# Patient Record
Sex: MALE | Race: WHITE | NOT HISPANIC OR LATINO | Employment: FULL TIME | URBAN - NONMETROPOLITAN AREA
[De-identification: names, ages, dates, MRNs, and addresses within clinical notes are randomized per-mention and may not be internally consistent; named-entity substitution may affect disease eponyms.]

---

## 2017-01-08 ENCOUNTER — APPOINTMENT (OUTPATIENT)
Dept: GENERAL RADIOLOGY | Facility: HOSPITAL | Age: 52
End: 2017-01-08

## 2017-01-08 ENCOUNTER — APPOINTMENT (OUTPATIENT)
Dept: CT IMAGING | Facility: HOSPITAL | Age: 52
End: 2017-01-08

## 2017-01-08 ENCOUNTER — HOSPITAL ENCOUNTER (EMERGENCY)
Facility: HOSPITAL | Age: 52
Discharge: HOME | End: 2017-01-08
Attending: EMERGENCY MEDICINE | Admitting: EMERGENCY MEDICINE

## 2017-01-08 VITALS
RESPIRATION RATE: 18 BRPM | BODY MASS INDEX: 36.43 KG/M2 | TEMPERATURE: 97.9 F | HEART RATE: 93 BPM | OXYGEN SATURATION: 98 % | DIASTOLIC BLOOD PRESSURE: 79 MMHG | HEIGHT: 69 IN | SYSTOLIC BLOOD PRESSURE: 119 MMHG | WEIGHT: 246 LBS

## 2017-01-08 DIAGNOSIS — R06.09 DYSPNEA ON EXERTION: Primary | ICD-10-CM

## 2017-01-08 LAB
A-A DO2: 33.6 MMHG (ref 0–300)
ALBUMIN SERPL-MCNC: 4 G/DL (ref 3.5–5)
ALBUMIN/GLOB SERPL: 1.3 G/DL (ref 1.5–2.5)
ALP SERPL-CCNC: 58 U/L (ref 46–116)
ALT SERPL W P-5'-P-CCNC: 19 U/L (ref 10–44)
ANION GAP SERPL CALCULATED.3IONS-SCNC: 5.1 MMOL/L (ref 3.6–11.2)
ARTERIAL PATENCY WRIST A: ABNORMAL
AST SERPL-CCNC: 15 U/L (ref 10–34)
ATMOSPHERIC PRESS: 741 MMHG
BASE EXCESS BLDA CALC-SCNC: -1.2 MMOL/L
BASOPHILS # BLD AUTO: 0.05 10*3/MM3 (ref 0–0.3)
BASOPHILS NFR BLD AUTO: 0.6 % (ref 0–2)
BDY SITE: ABNORMAL
BILIRUB SERPL-MCNC: 0.7 MG/DL (ref 0.2–1.8)
BILIRUB UR QL STRIP: NEGATIVE
BNP SERPL-MCNC: 249 PG/ML (ref 0–100)
BODY TEMPERATURE: 98.6 C
BUN BLD-MCNC: 16 MG/DL (ref 7–21)
BUN/CREAT SERPL: 19.3 (ref 7–25)
CALCIUM SPEC-SCNC: 9.5 MG/DL (ref 7.7–10)
CHLORIDE SERPL-SCNC: 105 MMOL/L (ref 99–112)
CK MB SERPL-CCNC: 3.76 NG/ML (ref 0–5)
CK MB SERPL-CCNC: 5.02 NG/ML (ref 0–5)
CK MB SERPL-RTO: 4.9 % (ref 0–3)
CK MB SERPL-RTO: 4.9 % (ref 0–3)
CK SERPL-CCNC: 102 U/L (ref 24–204)
CK SERPL-CCNC: 77 U/L (ref 24–204)
CLARITY UR: CLEAR
CO2 SERPL-SCNC: 28.9 MMOL/L (ref 24.3–31.9)
COHGB MFR BLD: 1.4 % (ref 0–5)
COLOR UR: YELLOW
CREAT BLD-MCNC: 0.83 MG/DL (ref 0.43–1.29)
D DIMER PPP FEU-MCNC: 0.59 MG/L (FEU) (ref 0–0.5)
DEPRECATED RDW RBC AUTO: 40.8 FL (ref 37–54)
EOSINOPHIL # BLD AUTO: 0.3 10*3/MM3 (ref 0–0.7)
EOSINOPHIL NFR BLD AUTO: 3.4 % (ref 0–5)
ERYTHROCYTE [DISTWIDTH] IN BLOOD BY AUTOMATED COUNT: 12.8 % (ref 11.5–14.5)
FLUAV AG NPH QL: NEGATIVE
FLUBV AG NPH QL IA: NEGATIVE
GFR SERPL CREATININE-BSD FRML MDRD: 98 ML/MIN/1.73
GLOBULIN UR ELPH-MCNC: 3.2 GM/DL
GLUCOSE BLD-MCNC: 146 MG/DL (ref 70–110)
GLUCOSE UR STRIP-MCNC: ABNORMAL MG/DL
HCO3 BLDA-SCNC: 23.1 MMOL/L (ref 22–26)
HCT VFR BLD AUTO: 44 % (ref 42–52)
HCT VFR BLD CALC: 44 % (ref 42–52)
HGB BLD-MCNC: 14.4 G/DL (ref 14–18)
HGB BLDA-MCNC: 15.1 G/DL (ref 12–16)
HGB UR QL STRIP.AUTO: NEGATIVE
HOROWITZ INDEX BLD+IHG-RTO: 21 %
IMM GRANULOCYTES # BLD: 0.01 10*3/MM3 (ref 0–0.03)
IMM GRANULOCYTES NFR BLD: 0.1 % (ref 0–0.5)
KETONES UR QL STRIP: NEGATIVE
LEUKOCYTE ESTERASE UR QL STRIP.AUTO: NEGATIVE
LYMPHOCYTES # BLD AUTO: 2.77 10*3/MM3 (ref 1–3)
LYMPHOCYTES NFR BLD AUTO: 31.2 % (ref 21–51)
MCH RBC QN AUTO: 28.5 PG (ref 27–33)
MCHC RBC AUTO-ENTMCNC: 32.7 G/DL (ref 33–37)
MCV RBC AUTO: 87.1 FL (ref 80–94)
METHGB BLD QL: 0.3 % (ref 0–3)
MODALITY: ABNORMAL
MONOCYTES # BLD AUTO: 0.67 10*3/MM3 (ref 0.1–0.9)
MONOCYTES NFR BLD AUTO: 7.5 % (ref 0–10)
MYOGLOBIN SERPL-MCNC: 36 NG/ML (ref 0–109)
MYOGLOBIN SERPL-MCNC: 38 NG/ML (ref 0–109)
NEUTROPHILS # BLD AUTO: 5.09 10*3/MM3 (ref 1.4–6.5)
NEUTROPHILS NFR BLD AUTO: 57.2 % (ref 30–70)
NITRITE UR QL STRIP: NEGATIVE
OSMOLALITY SERPL CALC.SUM OF ELEC: 281.4 MOSM/KG (ref 273–305)
OXYHGB MFR BLDV: 93.9 % (ref 85–100)
PCO2 BLDA: 37.7 MM HG (ref 35–45)
PH BLDA: 7.41 PH UNITS (ref 7.35–7.45)
PH UR STRIP.AUTO: 6 [PH] (ref 5–8)
PLATELET # BLD AUTO: 281 10*3/MM3 (ref 130–400)
PMV BLD AUTO: 9.5 FL (ref 6–10)
PO2 BLDA: 67 MM HG (ref 80–100)
POTASSIUM BLD-SCNC: 4.1 MMOL/L (ref 3.5–5.3)
PROT SERPL-MCNC: 7.2 G/DL (ref 6–8)
PROT UR QL STRIP: NEGATIVE
RBC # BLD AUTO: 5.05 10*6/MM3 (ref 4.7–6.1)
SAO2 % BLDCOA: 95.5 % (ref 90–100)
SODIUM BLD-SCNC: 139 MMOL/L (ref 135–153)
SP GR UR STRIP: 1.02 (ref 1–1.03)
TROPONIN I SERPL-MCNC: <0.006 NG/ML
TROPONIN I SERPL-MCNC: <0.006 NG/ML
UROBILINOGEN UR QL STRIP: ABNORMAL
WBC NRBC COR # BLD: 8.89 10*3/MM3 (ref 4.5–12.5)

## 2017-01-08 PROCEDURE — 36600 WITHDRAWAL OF ARTERIAL BLOOD: CPT | Performed by: EMERGENCY MEDICINE

## 2017-01-08 PROCEDURE — 71275 CT ANGIOGRAPHY CHEST: CPT | Performed by: RADIOLOGY

## 2017-01-08 PROCEDURE — 85025 COMPLETE CBC W/AUTO DIFF WBC: CPT | Performed by: EMERGENCY MEDICINE

## 2017-01-08 PROCEDURE — 82375 ASSAY CARBOXYHB QUANT: CPT | Performed by: EMERGENCY MEDICINE

## 2017-01-08 PROCEDURE — 83874 ASSAY OF MYOGLOBIN: CPT | Performed by: EMERGENCY MEDICINE

## 2017-01-08 PROCEDURE — 99284 EMERGENCY DEPT VISIT MOD MDM: CPT

## 2017-01-08 PROCEDURE — 82805 BLOOD GASES W/O2 SATURATION: CPT | Performed by: EMERGENCY MEDICINE

## 2017-01-08 PROCEDURE — 81003 URINALYSIS AUTO W/O SCOPE: CPT | Performed by: EMERGENCY MEDICINE

## 2017-01-08 PROCEDURE — 71010 HC CHEST PA OR AP: CPT

## 2017-01-08 PROCEDURE — 83880 ASSAY OF NATRIURETIC PEPTIDE: CPT | Performed by: EMERGENCY MEDICINE

## 2017-01-08 PROCEDURE — 71275 CT ANGIOGRAPHY CHEST: CPT

## 2017-01-08 PROCEDURE — 82553 CREATINE MB FRACTION: CPT | Performed by: EMERGENCY MEDICINE

## 2017-01-08 PROCEDURE — 87804 INFLUENZA ASSAY W/OPTIC: CPT | Performed by: EMERGENCY MEDICINE

## 2017-01-08 PROCEDURE — 83050 HGB METHEMOGLOBIN QUAN: CPT | Performed by: EMERGENCY MEDICINE

## 2017-01-08 PROCEDURE — 85379 FIBRIN DEGRADATION QUANT: CPT | Performed by: EMERGENCY MEDICINE

## 2017-01-08 PROCEDURE — 71010 XR CHEST 1 VW: CPT | Performed by: RADIOLOGY

## 2017-01-08 PROCEDURE — 84484 ASSAY OF TROPONIN QUANT: CPT | Performed by: EMERGENCY MEDICINE

## 2017-01-08 PROCEDURE — 93005 ELECTROCARDIOGRAM TRACING: CPT | Performed by: EMERGENCY MEDICINE

## 2017-01-08 PROCEDURE — 82550 ASSAY OF CK (CPK): CPT | Performed by: EMERGENCY MEDICINE

## 2017-01-08 PROCEDURE — 80053 COMPREHEN METABOLIC PANEL: CPT | Performed by: EMERGENCY MEDICINE

## 2017-01-08 PROCEDURE — 0 IOPAMIDOL 61 % SOLUTION: Performed by: EMERGENCY MEDICINE

## 2017-01-08 RX ORDER — SODIUM CHLORIDE 0.9 % (FLUSH) 0.9 %
10 SYRINGE (ML) INJECTION AS NEEDED
Status: DISCONTINUED | OUTPATIENT
Start: 2017-01-08 | End: 2017-01-09 | Stop reason: HOSPADM

## 2017-01-08 RX ORDER — LISINOPRIL 20 MG/1
20 TABLET ORAL DAILY
COMMUNITY
End: 2018-12-19

## 2017-01-08 RX ORDER — GLIPIZIDE 10 MG/1
10 TABLET ORAL
COMMUNITY
End: 2018-12-19

## 2017-01-08 RX ORDER — PRAVASTATIN SODIUM 10 MG
10 TABLET ORAL DAILY
COMMUNITY
End: 2018-12-19

## 2017-01-08 RX ADMIN — IOPAMIDOL 90 ML: 612 INJECTION, SOLUTION INTRAVENOUS at 19:45

## 2017-01-09 NOTE — DISCHARGE INSTRUCTIONS
Home in care of family.  Activity as tolerated.  Take your medications as prescribed.  As we discussed, you really should limit your salt intake.  Follow-up with Dr. Holden tomorrow.  Return to the emergency department if any problems.    Hypertension  Hypertension, commonly called high blood pressure, is when the force of blood pumping through your arteries is too strong. Your arteries are the blood vessels that carry blood from your heart throughout your body. A blood pressure reading consists of a higher number over a lower number, such as 110/72. The higher number (systolic) is the pressure inside your arteries when your heart pumps. The lower number (diastolic) is the pressure inside your arteries when your heart relaxes. Ideally you want your blood pressure below 120/80.  Hypertension forces your heart to work harder to pump blood. Your arteries may become narrow or stiff. Having untreated or uncontrolled hypertension can cause heart attack, stroke, kidney disease, and other problems.  RISK FACTORS  Some risk factors for high blood pressure are controllable. Others are not.   Risk factors you cannot control include:   · Race. You may be at higher risk if you are .  · Age. Risk increases with age.  · Gender. Men are at higher risk than women before age 45 years. After age 65, women are at higher risk than men.  Risk factors you can control include:  · Not getting enough exercise or physical activity.  · Being overweight.  · Getting too much fat, sugar, calories, or salt in your diet.  · Drinking too much alcohol.  SIGNS AND SYMPTOMS  Hypertension does not usually cause signs or symptoms. Extremely high blood pressure (hypertensive crisis) may cause headache, anxiety, shortness of breath, and nosebleed.  DIAGNOSIS  To check if you have hypertension, your health care provider will measure your blood pressure while you are seated, with your arm held at the level of your heart. It should be measured  at least twice using the same arm. Certain conditions can cause a difference in blood pressure between your right and left arms. A blood pressure reading that is higher than normal on one occasion does not mean that you need treatment. If it is not clear whether you have high blood pressure, you may be asked to return on a different day to have your blood pressure checked again. Or, you may be asked to monitor your blood pressure at home for 1 or more weeks.  TREATMENT  Treating high blood pressure includes making lifestyle changes and possibly taking medicine. Living a healthy lifestyle can help lower high blood pressure. You may need to change some of your habits.  Lifestyle changes may include:  · Following the DASH diet. This diet is high in fruits, vegetables, and whole grains. It is low in salt, red meat, and added sugars.  · Keep your sodium intake below 2,300 mg per day.  · Getting at least 30-45 minutes of aerobic exercise at least 4 times per week.  · Losing weight if necessary.  · Not smoking.  · Limiting alcoholic beverages.  · Learning ways to reduce stress.  Your health care provider may prescribe medicine if lifestyle changes are not enough to get your blood pressure under control, and if one of the following is true:  · You are 18-59 years of age and your systolic blood pressure is above 140.  · You are 60 years of age or older, and your systolic blood pressure is above 150.  · Your diastolic blood pressure is above 90.  · You have diabetes, and your systolic blood pressure is over 140 or your diastolic blood pressure is over 90.  · You have kidney disease and your blood pressure is above 140/90.  · You have heart disease and your blood pressure is above 140/90.  Your personal target blood pressure may vary depending on your medical conditions, your age, and other factors.  HOME CARE INSTRUCTIONS  · Have your blood pressure rechecked as directed by your health care provider.    · Take medicines only  as directed by your health care provider. Follow the directions carefully. Blood pressure medicines must be taken as prescribed. The medicine does not work as well when you skip doses. Skipping doses also puts you at risk for problems.  · Do not smoke.    · Monitor your blood pressure at home as directed by your health care provider.   SEEK MEDICAL CARE IF:   · You think you are having a reaction to medicines taken.  · You have recurrent headaches or feel dizzy.  · You have swelling in your ankles.  · You have trouble with your vision.  SEEK IMMEDIATE MEDICAL CARE IF:  · You develop a severe headache or confusion.  · You have unusual weakness, numbness, or feel faint.  · You have severe chest or abdominal pain.  · You vomit repeatedly.  · You have trouble breathing.  MAKE SURE YOU:   · Understand these instructions.  · Will watch your condition.  · Will get help right away if you are not doing well or get worse.     This information is not intended to replace advice given to you by your health care provider. Make sure you discuss any questions you have with your health care provider.     Document Released: 12/18/2006 Document Revised: 05/03/2016 Document Reviewed: 10/10/2014  [employer] Patient Education ©2016 [employer]

## 2017-01-09 NOTE — ED PROVIDER NOTES
Subjective   HPI Comments: Patient is a 51-year-old white male who presents here complaining of a 3 day history of dyspnea on exertion.  He has had no chest pain, no dyspnea at rest, no orthopnea or PND.  No fever, chills, cough, nausea, vomiting, diaphoresis.  No infectious symptoms.  He denies other symptoms or complaints.  He was seen at a local urgent care clinic and referred to the emergency department for further evaluation.    Past medical history is notable for the fact that in 2014 patient was found to have a dilated cardiomyopathy, apparently felt likely to be related to a viral infection.  He states that his ejection fraction got as low as 5%.  He was followed by Dr. Holden.  He did have a cardiac catheterization in 2014 that did not show any hemodynamically significant disease.  He states that this cardiomyopathy resolved on its own and at last check his ejection fraction was 50%.    Patient is a 51 y.o. male presenting with shortness of breath.   History provided by:  Patient and spouse  Shortness of Breath   Relieved by:  Rest  Worsened by:  Exertion  Associated symptoms: no abdominal pain, no chest pain, no claudication, no diaphoresis, no fever, no headaches, no hemoptysis, no neck pain, no PND, no rash, no sputum production, no syncope, no vomiting and no wheezing    Risk factors: no recent alcohol use, no hx of PE/DVT, no prolonged immobilization, no recent surgery and no tobacco use        Review of Systems   Constitutional: Negative for chills, diaphoresis and fever.   HENT: Negative for congestion and sinus pressure.    Eyes: Negative for pain.   Respiratory: Positive for shortness of breath. Negative for hemoptysis, sputum production, wheezing and stridor.    Cardiovascular: Negative for chest pain, palpitations, claudication, leg swelling, syncope and PND.   Gastrointestinal: Negative for abdominal distention, abdominal pain, diarrhea, nausea and vomiting.   Endocrine: Negative for polydipsia,  polyphagia and polyuria.   Genitourinary: Negative for difficulty urinating and flank pain.   Musculoskeletal: Negative for neck pain and neck stiffness.   Skin: Negative for color change and rash.   Neurological: Negative for dizziness, seizures, syncope and headaches.   Psychiatric/Behavioral: Negative for confusion.   All other systems reviewed and are negative.      Past Medical History   Diagnosis Date   • Diabetes mellitus    • Hyperlipidemia    • Hypertension        No Known Allergies    Past Surgical History   Procedure Laterality Date   • Cardiac catheterization       no stents       History reviewed. No pertinent family history.    Social History     Social History   • Marital status:      Spouse name: N/A   • Number of children: N/A   • Years of education: N/A     Social History Main Topics   • Smoking status: Unknown If Ever Smoked   • Smokeless tobacco: None   • Alcohol use No   • Drug use: No   • Sexual activity: Defer     Other Topics Concern   • None     Social History Narrative   • None           Objective   Physical Exam   Constitutional: He is oriented to person, place, and time. He appears well-developed and well-nourished. No distress.   HENT:   Head: Normocephalic and atraumatic.   Eyes: EOM are normal. Pupils are equal, round, and reactive to light. No scleral icterus.   Neck: Normal range of motion. Neck supple. No rigidity. No tracheal deviation present.   Cardiovascular: Normal rate, regular rhythm and intact distal pulses.    Pulmonary/Chest: Effort normal and breath sounds normal. No respiratory distress. He has no wheezes. He has no rales. He exhibits no tenderness.   Abdominal: Soft. Bowel sounds are normal. There is no tenderness. There is no rebound and no guarding.   Musculoskeletal: Normal range of motion. He exhibits no tenderness.   Neurological: He is alert and oriented to person, place, and time. He has normal strength. No sensory deficit. He exhibits normal muscle tone.  Coordination normal. GCS eye subscore is 4. GCS verbal subscore is 5. GCS motor subscore is 6.   Skin: Skin is warm and dry. He is not diaphoretic. No cyanosis. No pallor.   Psychiatric: He has a normal mood and affect. His behavior is normal.   Vitals reviewed.      Procedures   EKG shows sinus rhythm with a rate of 96.  Nonspecific ST T abnormalities.  No apparent acute ischemia.  There are no previous EKGs on the Epic system with which to compare.  CT Chest Pulmonary Embolism With Contrast   ED Interpretation   Per virtual radiology, no pulmonary embolism.  No significant   pericardial effusion.  No cardiomegaly.  No evidence of RV   dysfunction.  No acute abnormalities.      XR Chest 1 View   Final Result   No evidence of active or acute cardiopulmonary disease on today's chest   radiograph.           This report was finalized on 1/8/2017 6:59 PM by Dr. Raji Burnham MD.            Results for orders placed or performed during the hospital encounter of 01/08/17   Influenza Antigen   Result Value Ref Range    Influenza A Ag, EIA Negative Negative    Influenza B Ag, EIA Negative Negative   Comprehensive Metabolic Panel   Result Value Ref Range    Glucose 146 (H) 70 - 110 mg/dL    BUN 16 7 - 21 mg/dL    Creatinine 0.83 0.43 - 1.29 mg/dL    Sodium 139 135 - 153 mmol/L    Potassium 4.1 3.5 - 5.3 mmol/L    Chloride 105 99 - 112 mmol/L    CO2 28.9 24.3 - 31.9 mmol/L    Calcium 9.5 7.7 - 10.0 mg/dL    Total Protein 7.2 6.0 - 8.0 g/dL    Albumin 4.00 3.50 - 5.00 g/dL    ALT (SGPT) 19 10 - 44 U/L    AST (SGOT) 15 10 - 34 U/L    Alkaline Phosphatase 58 46 - 116 U/L    Total Bilirubin 0.7 0.2 - 1.8 mg/dL    eGFR Non African Amer 98 >60 mL/min/1.73    Globulin 3.2 gm/dL    A/G Ratio 1.3 (L) 1.5 - 2.5 g/dL    BUN/Creatinine Ratio 19.3 7.0 - 25.0    Anion Gap 5.1 3.6 - 11.2 mmol/L   D-dimer, Quantitative   Result Value Ref Range    D-Dimer, Quantitative 0.59 (C) 0.00 - 0.50 mg/L (FEU)   BNP   Result Value Ref Range    BNP  249.0 (H) 0.0 - 100.0 pg/mL   Urinalysis With / Culture If Indicated   Result Value Ref Range    Color, UA Yellow Yellow, Straw    Appearance, UA Clear Clear    pH, UA 6.0 5.0 - 8.0    Specific Gravity, UA 1.021 1.005 - 1.030    Glucose,  mg/dL (Trace) (A) Negative    Ketones, UA Negative Negative    Bilirubin, UA Negative Negative    Blood, UA Negative Negative    Protein, UA Negative Negative    Leuk Esterase, UA Negative Negative    Nitrite, UA Negative Negative    Urobilinogen, UA 0.2 E.U./dL 0.2 - 1.0 E.U./dL   CK   Result Value Ref Range    Creatine Kinase 102 24 - 204 U/L   Myoglobin, Serum   Result Value Ref Range    Myoglobin 36.0 0.0 - 109.0 ng/mL   CK-MB   Result Value Ref Range    CKMB 5.02 (C) 0.00 - 5.00 ng/mL   Troponin   Result Value Ref Range    Troponin I <0.006 <=0.040 ng/mL   Blood Gas, Arterial With Co-Ox   Result Value Ref Range    Site Arterial: left brachial     Nestor's Test N/A     pH, Arterial 7.406 7.350 - 7.450 pH units    pCO2, Arterial 37.7 35.0 - 45.0 mm Hg    pO2, Arterial 67.0 (L) 80.0 - 100.0 mm Hg    HCO3, Arterial 23.1 22.0 - 26.0 mmol/L    Base Excess, Arterial -1.2 mmol/L    O2 Saturation, Arterial 95.5 90.0 - 100.0 %    Hemoglobin, Blood Gas 15.1 12 - 16 g/dL    Hematocrit, Blood Gas 44.0 42.0 - 52.0 %    Oxyhemoglobin 93.9 85 - 100 %    Methemoglobin 0.3 0 - 3 %    Carboxyhemoglobin 1.4 0 - 5 %    A-a Gradiant 33.6 0.0 - 300.0 mmHg    Temperature 98.6 C    Barometric Pressure for Blood Gas 741 mmHg    Modality Room air     FIO2 21 %   CBC Auto Differential   Result Value Ref Range    WBC 8.89 4.50 - 12.50 10*3/mm3    RBC 5.05 4.70 - 6.10 10*6/mm3    Hemoglobin 14.4 14.0 - 18.0 g/dL    Hematocrit 44.0 42.0 - 52.0 %    MCV 87.1 80.0 - 94.0 fL    MCH 28.5 27.0 - 33.0 pg    MCHC 32.7 (L) 33.0 - 37.0 g/dL    RDW 12.8 11.5 - 14.5 %    RDW-SD 40.8 37.0 - 54.0 fl    MPV 9.5 6.0 - 10.0 fL    Platelets 281 130 - 400 10*3/mm3    Neutrophil % 57.2 30.0 - 70.0 %    Lymphocyte % 31.2  21.0 - 51.0 %    Monocyte % 7.5 0.0 - 10.0 %    Eosinophil % 3.4 0.0 - 5.0 %    Basophil % 0.6 0.0 - 2.0 %    Immature Grans % 0.1 0.0 - 0.5 %    Neutrophils, Absolute 5.09 1.40 - 6.50 10*3/mm3    Lymphocytes, Absolute 2.77 1.00 - 3.00 10*3/mm3    Monocytes, Absolute 0.67 0.10 - 0.90 10*3/mm3    Eosinophils, Absolute 0.30 0.00 - 0.70 10*3/mm3    Basophils, Absolute 0.05 0.00 - 0.30 10*3/mm3    Immature Grans, Absolute 0.01 0.00 - 0.03 10*3/mm3   Osmolality, Calculated   Result Value Ref Range    Osmolality Calc 281.4 273.0 - 305.0 mOsm/kg   CK-MB Index   Result Value Ref Range    CK-MB Index 4.9 (H) 0.0 - 3.0 %   CK   Result Value Ref Range    Creatine Kinase 77 24 - 204 U/L   Myoglobin, Serum   Result Value Ref Range    Myoglobin 38.0 0.0 - 109.0 ng/mL   CK-MB   Result Value Ref Range    CKMB 3.76 0.00 - 5.00 ng/mL   Troponin   Result Value Ref Range    Troponin I <0.006 <=0.040 ng/mL   CK-MB Index   Result Value Ref Range    CK-MB Index 4.9 (H) 0.0 - 3.0 %              ED Course  ED Course   Comment By Time   Patient's emergency department course is been entirely uncomplicated.  Never has he shown any signs of distress.  Has been asymptomatic.  Patient, his wife and I discussed all the test results.  He does not wish for me to admit him to the hospital.  He prefers instead for me to discharge him to home, and he will follow-up with Dr. Holden in the office as soon as possible for further evaluation.  He will return to the emergency department if he has any problems. Paul Lisa MD 01/08 [id number]                  MDM  Number of Diagnoses or Management Options  Dyspnea on exertion:   Patient Progress  Patient progress: stable      Final diagnoses:   Dyspnea on exertion             Please note that portions of this note were completed with a voice recognition program. Efforts were made to edit the dictations, but occasionally words are mistranscribed.       Paul Lisa MD  01/08/17 [id number]

## 2017-05-17 ENCOUNTER — TRANSCRIBE ORDERS (OUTPATIENT)
Dept: ADMINISTRATIVE | Facility: HOSPITAL | Age: 52
End: 2017-05-17

## 2017-05-17 DIAGNOSIS — M25.512 LEFT SHOULDER PAIN, UNSPECIFIED CHRONICITY: Primary | ICD-10-CM

## 2017-05-24 ENCOUNTER — APPOINTMENT (OUTPATIENT)
Dept: MRI IMAGING | Facility: HOSPITAL | Age: 52
End: 2017-05-24

## 2018-12-18 ENCOUNTER — TRANSCRIBE ORDERS (OUTPATIENT)
Dept: ADMINISTRATIVE | Facility: HOSPITAL | Age: 53
End: 2018-12-18

## 2018-12-18 DIAGNOSIS — R07.89 CHEST DISCOMFORT: Primary | ICD-10-CM

## 2018-12-19 ENCOUNTER — HOSPITAL ENCOUNTER (OUTPATIENT)
Facility: HOSPITAL | Age: 53
Setting detail: OBSERVATION
Discharge: HOME | End: 2018-12-20
Attending: EMERGENCY MEDICINE | Admitting: EMERGENCY MEDICINE

## 2018-12-19 ENCOUNTER — APPOINTMENT (OUTPATIENT)
Dept: GENERAL RADIOLOGY | Facility: HOSPITAL | Age: 53
End: 2018-12-19

## 2018-12-19 DIAGNOSIS — R07.9 CHEST PAIN, UNSPECIFIED TYPE: Primary | ICD-10-CM

## 2018-12-19 LAB
ALBUMIN SERPL-MCNC: 4.2 G/DL (ref 3.5–5)
ALBUMIN/GLOB SERPL: 1.4 G/DL (ref 1.5–2.5)
ALP SERPL-CCNC: 59 U/L (ref 40–129)
ALT SERPL W P-5'-P-CCNC: 20 U/L (ref 10–44)
ANION GAP SERPL CALCULATED.3IONS-SCNC: 8.1 MMOL/L (ref 3.6–11.2)
APTT PPP: 27.8 SECONDS (ref 23.8–36.1)
AST SERPL-CCNC: 14 U/L (ref 10–34)
BASOPHILS # BLD AUTO: 0.05 10*3/MM3 (ref 0–0.3)
BASOPHILS NFR BLD AUTO: 0.7 % (ref 0–2)
BILIRUB SERPL-MCNC: 0.8 MG/DL (ref 0.2–1.8)
BILIRUB UR QL STRIP: NEGATIVE
BNP SERPL-MCNC: 250.6 PG/ML (ref 0–100)
BUN BLD-MCNC: 20 MG/DL (ref 7–21)
BUN/CREAT SERPL: 26.7 (ref 7–25)
CALCIUM SPEC-SCNC: 9.4 MG/DL (ref 7.7–10)
CHLORIDE SERPL-SCNC: 103 MMOL/L (ref 99–112)
CLARITY UR: CLEAR
CO2 SERPL-SCNC: 25.9 MMOL/L (ref 24.3–31.9)
COLOR UR: YELLOW
CREAT BLD-MCNC: 0.75 MG/DL (ref 0.43–1.29)
DEPRECATED RDW RBC AUTO: 39.9 FL (ref 37–54)
DIGOXIN SERPL-MCNC: 0.3 NG/ML (ref 0.8–2)
EOSINOPHIL # BLD AUTO: 0.27 10*3/MM3 (ref 0–0.7)
EOSINOPHIL NFR BLD AUTO: 3.7 % (ref 0–5)
ERYTHROCYTE [DISTWIDTH] IN BLOOD BY AUTOMATED COUNT: 12.8 % (ref 11.5–14.5)
GFR SERPL CREATININE-BSD FRML MDRD: 110 ML/MIN/1.73
GLOBULIN UR ELPH-MCNC: 3 GM/DL
GLUCOSE BLD-MCNC: 227 MG/DL (ref 70–110)
GLUCOSE BLDC GLUCOMTR-MCNC: 207 MG/DL (ref 70–130)
GLUCOSE BLDC GLUCOMTR-MCNC: 223 MG/DL (ref 70–130)
GLUCOSE BLDC GLUCOMTR-MCNC: 284 MG/DL (ref 70–130)
GLUCOSE BLDC GLUCOMTR-MCNC: 288 MG/DL (ref 70–130)
GLUCOSE UR STRIP-MCNC: ABNORMAL MG/DL
HCT VFR BLD AUTO: 41.8 % (ref 42–52)
HGB BLD-MCNC: 14 G/DL (ref 14–18)
HGB UR QL STRIP.AUTO: NEGATIVE
HOLD SPECIMEN: NORMAL
HOLD SPECIMEN: NORMAL
IMM GRANULOCYTES # BLD: 0.01 10*3/MM3 (ref 0–0.03)
IMM GRANULOCYTES NFR BLD: 0.1 % (ref 0–0.5)
INR PPP: 0.9 (ref 0.9–1.1)
KETONES UR QL STRIP: NEGATIVE
LEUKOCYTE ESTERASE UR QL STRIP.AUTO: NEGATIVE
LYMPHOCYTES # BLD AUTO: 2.2 10*3/MM3 (ref 1–3)
LYMPHOCYTES NFR BLD AUTO: 29.8 % (ref 21–51)
MCH RBC QN AUTO: 29.1 PG (ref 27–33)
MCHC RBC AUTO-ENTMCNC: 33.5 G/DL (ref 33–37)
MCV RBC AUTO: 86.9 FL (ref 80–94)
MONOCYTES # BLD AUTO: 0.41 10*3/MM3 (ref 0.1–0.9)
MONOCYTES NFR BLD AUTO: 5.6 % (ref 0–10)
NEUTROPHILS # BLD AUTO: 4.44 10*3/MM3 (ref 1.4–6.5)
NEUTROPHILS NFR BLD AUTO: 60.1 % (ref 30–70)
NITRITE UR QL STRIP: NEGATIVE
OSMOLALITY SERPL CALC.SUM OF ELEC: 283.6 MOSM/KG (ref 273–305)
PH UR STRIP.AUTO: <=5 [PH] (ref 5–8)
PLATELET # BLD AUTO: 290 10*3/MM3 (ref 130–400)
PMV BLD AUTO: 9.4 FL (ref 6–10)
POTASSIUM BLD-SCNC: 4.5 MMOL/L (ref 3.5–5.3)
PROT SERPL-MCNC: 7.2 G/DL (ref 6–8)
PROT UR QL STRIP: NEGATIVE
PROTHROMBIN TIME: 12.4 SECONDS (ref 11–15.4)
RBC # BLD AUTO: 4.81 10*6/MM3 (ref 4.7–6.1)
SODIUM BLD-SCNC: 137 MMOL/L (ref 135–153)
SP GR UR STRIP: 1.03 (ref 1–1.03)
TROPONIN I SERPL-MCNC: 0.01 NG/ML
TROPONIN I SERPL-MCNC: 0.01 NG/ML
TROPONIN I SERPL-MCNC: <0.006 NG/ML
UROBILINOGEN UR QL STRIP: ABNORMAL
WBC NRBC COR # BLD: 7.38 10*3/MM3 (ref 4.5–12.5)
WHOLE BLOOD HOLD SPECIMEN: NORMAL
WHOLE BLOOD HOLD SPECIMEN: NORMAL

## 2018-12-19 PROCEDURE — 85730 THROMBOPLASTIN TIME PARTIAL: CPT | Performed by: PHYSICIAN ASSISTANT

## 2018-12-19 PROCEDURE — 99284 EMERGENCY DEPT VISIT MOD MDM: CPT

## 2018-12-19 PROCEDURE — 85610 PROTHROMBIN TIME: CPT | Performed by: PHYSICIAN ASSISTANT

## 2018-12-19 PROCEDURE — 83880 ASSAY OF NATRIURETIC PEPTIDE: CPT | Performed by: PHYSICIAN ASSISTANT

## 2018-12-19 PROCEDURE — 82962 GLUCOSE BLOOD TEST: CPT

## 2018-12-19 PROCEDURE — 80162 ASSAY OF DIGOXIN TOTAL: CPT | Performed by: PHYSICIAN ASSISTANT

## 2018-12-19 PROCEDURE — 85025 COMPLETE CBC W/AUTO DIFF WBC: CPT | Performed by: PHYSICIAN ASSISTANT

## 2018-12-19 PROCEDURE — G0378 HOSPITAL OBSERVATION PER HR: HCPCS

## 2018-12-19 PROCEDURE — 25010000002 ENOXAPARIN PER 10 MG: Performed by: INTERNAL MEDICINE

## 2018-12-19 PROCEDURE — 96372 THER/PROPH/DIAG INJ SC/IM: CPT

## 2018-12-19 PROCEDURE — 94799 UNLISTED PULMONARY SVC/PX: CPT

## 2018-12-19 PROCEDURE — 81003 URINALYSIS AUTO W/O SCOPE: CPT | Performed by: PHYSICIAN ASSISTANT

## 2018-12-19 PROCEDURE — 80053 COMPREHEN METABOLIC PANEL: CPT | Performed by: PHYSICIAN ASSISTANT

## 2018-12-19 PROCEDURE — 71045 X-RAY EXAM CHEST 1 VIEW: CPT | Performed by: RADIOLOGY

## 2018-12-19 PROCEDURE — 84484 ASSAY OF TROPONIN QUANT: CPT | Performed by: INTERNAL MEDICINE

## 2018-12-19 PROCEDURE — 84484 ASSAY OF TROPONIN QUANT: CPT | Performed by: PHYSICIAN ASSISTANT

## 2018-12-19 PROCEDURE — 99204 OFFICE O/P NEW MOD 45 MIN: CPT | Performed by: INTERNAL MEDICINE

## 2018-12-19 PROCEDURE — 63710000001 INSULIN ASPART PER 5 UNITS: Performed by: INTERNAL MEDICINE

## 2018-12-19 PROCEDURE — 71045 X-RAY EXAM CHEST 1 VIEW: CPT

## 2018-12-19 PROCEDURE — 93005 ELECTROCARDIOGRAM TRACING: CPT | Performed by: EMERGENCY MEDICINE

## 2018-12-19 PROCEDURE — 96360 HYDRATION IV INFUSION INIT: CPT

## 2018-12-19 RX ORDER — GLIPIZIDE 5 MG/1
5 TABLET ORAL
Status: DISCONTINUED | OUTPATIENT
Start: 2018-12-19 | End: 2018-12-20 | Stop reason: HOSPADM

## 2018-12-19 RX ORDER — ATORVASTATIN CALCIUM 10 MG/1
10 TABLET, FILM COATED ORAL NIGHTLY
Status: DISCONTINUED | OUTPATIENT
Start: 2018-12-19 | End: 2018-12-20 | Stop reason: HOSPADM

## 2018-12-19 RX ORDER — NICOTINE POLACRILEX 4 MG
15 LOZENGE BUCCAL
Status: DISCONTINUED | OUTPATIENT
Start: 2018-12-19 | End: 2018-12-20 | Stop reason: HOSPADM

## 2018-12-19 RX ORDER — NALOXONE HCL 0.4 MG/ML
0.4 VIAL (ML) INJECTION
Status: DISCONTINUED | OUTPATIENT
Start: 2018-12-19 | End: 2018-12-20 | Stop reason: HOSPADM

## 2018-12-19 RX ORDER — MAGNESIUM SULFATE HEPTAHYDRATE 40 MG/ML
2 INJECTION, SOLUTION INTRAVENOUS AS NEEDED
Status: DISCONTINUED | OUTPATIENT
Start: 2018-12-19 | End: 2018-12-20 | Stop reason: HOSPADM

## 2018-12-19 RX ORDER — LOSARTAN POTASSIUM 50 MG/1
50 TABLET ORAL DAILY
Status: DISCONTINUED | OUTPATIENT
Start: 2018-12-19 | End: 2018-12-20 | Stop reason: HOSPADM

## 2018-12-19 RX ORDER — POTASSIUM CHLORIDE 1.5 G/1.77G
40 POWDER, FOR SOLUTION ORAL AS NEEDED
Status: DISCONTINUED | OUTPATIENT
Start: 2018-12-19 | End: 2018-12-20 | Stop reason: HOSPADM

## 2018-12-19 RX ORDER — SPIRONOLACTONE 50 MG/1
50 TABLET, FILM COATED ORAL DAILY
COMMUNITY
End: 2018-12-19

## 2018-12-19 RX ORDER — CARVEDILOL 3.12 MG/1
3.12 TABLET ORAL 2 TIMES DAILY WITH MEALS
Status: ON HOLD | COMMUNITY
End: 2018-12-19

## 2018-12-19 RX ORDER — FUROSEMIDE 20 MG/1
20 TABLET ORAL DAILY
Status: DISCONTINUED | OUTPATIENT
Start: 2018-12-19 | End: 2018-12-20 | Stop reason: HOSPADM

## 2018-12-19 RX ORDER — CARVEDILOL 3.12 MG/1
3.12 TABLET ORAL 2 TIMES DAILY WITH MEALS
Status: CANCELLED | OUTPATIENT
Start: 2018-12-19

## 2018-12-19 RX ORDER — GLYBURIDE 5 MG/1
5 TABLET ORAL 2 TIMES DAILY
Status: ON HOLD | COMMUNITY
End: 2021-10-05

## 2018-12-19 RX ORDER — POTASSIUM CHLORIDE 750 MG/1
40 CAPSULE, EXTENDED RELEASE ORAL AS NEEDED
Status: DISCONTINUED | OUTPATIENT
Start: 2018-12-19 | End: 2018-12-20 | Stop reason: HOSPADM

## 2018-12-19 RX ORDER — SPIRONOLACTONE 25 MG/1
25 TABLET ORAL DAILY
Status: DISCONTINUED | OUTPATIENT
Start: 2018-12-19 | End: 2018-12-20 | Stop reason: HOSPADM

## 2018-12-19 RX ORDER — DIGOXIN 125 MCG
125 TABLET ORAL
Status: ON HOLD | COMMUNITY
End: 2021-10-05

## 2018-12-19 RX ORDER — CARVEDILOL 12.5 MG/1
6.25 TABLET ORAL 2 TIMES DAILY WITH MEALS
Status: ON HOLD | COMMUNITY
End: 2021-10-05 | Stop reason: DRUGHIGH

## 2018-12-19 RX ORDER — SODIUM CHLORIDE 0.9 % (FLUSH) 0.9 %
3-10 SYRINGE (ML) INJECTION AS NEEDED
Status: DISCONTINUED | OUTPATIENT
Start: 2018-12-19 | End: 2018-12-20 | Stop reason: HOSPADM

## 2018-12-19 RX ORDER — ONDANSETRON 2 MG/ML
4 INJECTION INTRAMUSCULAR; INTRAVENOUS EVERY 6 HOURS PRN
Status: DISCONTINUED | OUTPATIENT
Start: 2018-12-19 | End: 2018-12-20 | Stop reason: HOSPADM

## 2018-12-19 RX ORDER — CARVEDILOL 6.25 MG/1
6.25 TABLET ORAL 2 TIMES DAILY WITH MEALS
Status: DISCONTINUED | OUTPATIENT
Start: 2018-12-19 | End: 2018-12-20 | Stop reason: HOSPADM

## 2018-12-19 RX ORDER — SODIUM CHLORIDE 0.9 % (FLUSH) 0.9 %
3 SYRINGE (ML) INJECTION EVERY 12 HOURS SCHEDULED
Status: DISCONTINUED | OUTPATIENT
Start: 2018-12-19 | End: 2018-12-20 | Stop reason: HOSPADM

## 2018-12-19 RX ORDER — ACETAMINOPHEN 325 MG/1
650 TABLET ORAL EVERY 4 HOURS PRN
Status: DISCONTINUED | OUTPATIENT
Start: 2018-12-19 | End: 2018-12-20 | Stop reason: HOSPADM

## 2018-12-19 RX ORDER — SODIUM CHLORIDE 0.9 % (FLUSH) 0.9 %
10 SYRINGE (ML) INJECTION AS NEEDED
Status: DISCONTINUED | OUTPATIENT
Start: 2018-12-19 | End: 2018-12-20 | Stop reason: HOSPADM

## 2018-12-19 RX ORDER — ONDANSETRON 4 MG/1
4 TABLET, ORALLY DISINTEGRATING ORAL EVERY 6 HOURS PRN
Status: DISCONTINUED | OUTPATIENT
Start: 2018-12-19 | End: 2018-12-20 | Stop reason: HOSPADM

## 2018-12-19 RX ORDER — DIGOXIN 125 MCG
125 TABLET ORAL
Status: DISCONTINUED | OUTPATIENT
Start: 2018-12-19 | End: 2018-12-20 | Stop reason: HOSPADM

## 2018-12-19 RX ORDER — ASPIRIN 81 MG/1
324 TABLET, CHEWABLE ORAL ONCE
Status: COMPLETED | OUTPATIENT
Start: 2018-12-19 | End: 2018-12-19

## 2018-12-19 RX ORDER — DIGOXIN 125 MCG
125 TABLET ORAL
COMMUNITY
End: 2018-12-19

## 2018-12-19 RX ORDER — NITROGLYCERIN 0.4 MG/1
0.4 TABLET SUBLINGUAL
Status: DISCONTINUED | OUTPATIENT
Start: 2018-12-19 | End: 2018-12-20 | Stop reason: HOSPADM

## 2018-12-19 RX ORDER — DEXTROSE MONOHYDRATE 25 G/50ML
25 INJECTION, SOLUTION INTRAVENOUS
Status: DISCONTINUED | OUTPATIENT
Start: 2018-12-19 | End: 2018-12-20 | Stop reason: HOSPADM

## 2018-12-19 RX ORDER — SODIUM CHLORIDE 9 MG/ML
75 INJECTION, SOLUTION INTRAVENOUS CONTINUOUS
Status: DISCONTINUED | OUTPATIENT
Start: 2018-12-19 | End: 2018-12-19

## 2018-12-19 RX ORDER — ONDANSETRON 4 MG/1
4 TABLET, FILM COATED ORAL EVERY 6 HOURS PRN
Status: DISCONTINUED | OUTPATIENT
Start: 2018-12-19 | End: 2018-12-20 | Stop reason: HOSPADM

## 2018-12-19 RX ORDER — SPIRONOLACTONE 25 MG/1
25 TABLET ORAL DAILY
Status: ON HOLD | COMMUNITY
End: 2021-10-05

## 2018-12-19 RX ORDER — PRAVASTATIN SODIUM 20 MG
20 TABLET ORAL NIGHTLY
Status: ON HOLD | COMMUNITY
End: 2021-10-05 | Stop reason: DRUGHIGH

## 2018-12-19 RX ORDER — FUROSEMIDE 20 MG/1
20 TABLET ORAL DAILY
Status: ON HOLD | COMMUNITY
End: 2021-10-05

## 2018-12-19 RX ORDER — POTASSIUM CHLORIDE 7.45 MG/ML
10 INJECTION INTRAVENOUS
Status: DISCONTINUED | OUTPATIENT
Start: 2018-12-19 | End: 2018-12-20 | Stop reason: HOSPADM

## 2018-12-19 RX ORDER — LOSARTAN POTASSIUM 50 MG/1
50 TABLET ORAL DAILY
COMMUNITY
End: 2021-10-07 | Stop reason: HOSPADM

## 2018-12-19 RX ORDER — MORPHINE SULFATE 10 MG/ML
1 INJECTION INTRAMUSCULAR; INTRAVENOUS; SUBCUTANEOUS EVERY 6 HOURS PRN
Status: DISCONTINUED | OUTPATIENT
Start: 2018-12-19 | End: 2018-12-20 | Stop reason: HOSPADM

## 2018-12-19 RX ORDER — MAGNESIUM SULFATE HEPTAHYDRATE 40 MG/ML
4 INJECTION, SOLUTION INTRAVENOUS AS NEEDED
Status: DISCONTINUED | OUTPATIENT
Start: 2018-12-19 | End: 2018-12-20 | Stop reason: HOSPADM

## 2018-12-19 RX ADMIN — SPIRONOLACTONE 25 MG: 25 TABLET ORAL at 17:50

## 2018-12-19 RX ADMIN — FUROSEMIDE 20 MG: 20 TABLET ORAL at 17:50

## 2018-12-19 RX ADMIN — INSULIN ASPART 4 UNITS: 100 INJECTION, SOLUTION INTRAVENOUS; SUBCUTANEOUS at 21:56

## 2018-12-19 RX ADMIN — CARVEDILOL 6.25 MG: 6.25 TABLET, FILM COATED ORAL at 17:50

## 2018-12-19 RX ADMIN — SODIUM CHLORIDE 75 ML/HR: 9 INJECTION, SOLUTION INTRAVENOUS at 15:03

## 2018-12-19 RX ADMIN — GLIPIZIDE 5 MG: 5 TABLET ORAL at 17:50

## 2018-12-19 RX ADMIN — LOSARTAN POTASSIUM 50 MG: 50 TABLET, FILM COATED ORAL at 17:50

## 2018-12-19 RX ADMIN — METFORMIN HYDROCHLORIDE 1000 MG: 500 TABLET ORAL at 17:50

## 2018-12-19 RX ADMIN — ENOXAPARIN SODIUM 40 MG: 40 INJECTION SUBCUTANEOUS at 17:50

## 2018-12-19 RX ADMIN — SODIUM CHLORIDE, PRESERVATIVE FREE 3 ML: 5 INJECTION INTRAVENOUS at 22:23

## 2018-12-19 RX ADMIN — ASPIRIN 324 MG: 81 TABLET, CHEWABLE ORAL at 13:14

## 2018-12-19 RX ADMIN — ATORVASTATIN CALCIUM 10 MG: 10 TABLET, FILM COATED ORAL at 21:56

## 2018-12-19 RX ADMIN — NITROGLYCERIN 1 INCH: 20 OINTMENT TOPICAL at 13:15

## 2018-12-19 RX ADMIN — INSULIN ASPART 6 UNITS: 100 INJECTION, SOLUTION INTRAVENOUS; SUBCUTANEOUS at 17:50

## 2018-12-19 RX ADMIN — DIGOXIN 125 MCG: 125 TABLET ORAL at 17:50

## 2018-12-19 NOTE — H&P
HISTORY AND PHYSICAL        Patient Identification:  Name:  Cas Mccabe  Age:  53 y.o.  Sex:  male  :  1965  MRN:  [id number]   Visit Number:  [id number]  Primary Care Physician:  Girish Leary MD       Subjective     Subjective     Chief complaint:     Chief Complaint   Patient presents with   • Chest Pain       History of presenting illness:     The patient is a 53 year old male with past medical history significant for diabetes, hypertension, hyperlipidemia who presented to the ED today with chest pain. The patient reports intermittent chest pressure for the last three weeks. He states exertion worsens his symptoms and rest somewhat relieves it. He also complains of associated shortness of breath and cough productive of clear sputum. He reports orthopnea. He was seen by his PCP yesterday who initiated him on Digoxin, Aldactone and Lasix. He states several years ago he had similar symptoms with echo performed showing an ejection fraction of 5-10%. During that time his wife reports he was on digoxin, aldactone and lasix. Since that time he had a follow up echo from  that reports EF of 45-50%. He is scheduled for an echo as outpatient after  but began having worsening shortness of breath, chest pressure and dizziness today at work prompting him to come to the ED. Denies any significant swelling, headache or diaphoresis. Admitted to the observation unit for further evaluation and cardio workup       ---------------------------------------------------------------------------------------------------------------------     Review Of Systems:    Constitutional: no fever, chills and night sweats. No appetite change or unexpected weight change. No fatigue.  Eyes: no eye drainage, itching or redness.  HEENT: no mouth sores, dysphagia or nose bleed.  Respiratory: Positive for shortness of breath, productive cough  Cardiovascular: Positive for chest pain and orthopnea, no palpitation.    Gastrointestinal: no nausea, vomiting or diarrhea. No abdominal pain, hematemesis or rectal bleeding.  Genitourinary: no dysuria or polyuria.  Hematologic/lymphatic: no lymph node abnormalities, no easy bruising or easy bleeding.  Musculoskeletal: no muscle or joint pain.  Skin: No rash and no itching.  Neurological: no loss of consciousness, no seizure, no headache.  Behavioral/Psych: no depression or suicidal ideation.  Endocrine: no hot flashes.  Immunologic: negative.    ---------------------------------------------------------------------------------------------------------------------     Past Medical History    Past Medical History:   Diagnosis Date   • Diabetes mellitus (CMS/[facility])    • Hyperlipidemia    • Hypertension        Past Surgical History    Past Surgical History:   Procedure Laterality Date   • CARDIAC CATHETERIZATION      no stents       Family History    History reviewed. No pertinent family history.    Social History    Social History     Tobacco Use   • Smoking status: Never Smoker   • Smokeless tobacco: Never Used   Substance Use Topics   • Alcohol use: No     Frequency: Never     Binge frequency: Never   • Drug use: No       Allergies    Patient has no known allergies.  ---------------------------------------------------------------------------------------------------------------------     Home Medications:    Prior to Admission Medications     Prescriptions Last Dose Informant Patient Reported? Taking?    carvedilol (COREG) 12.5 MG tablet 12/18/2018 Pharmacy Yes Yes    Take 6.25 mg by mouth 2 (Two) Times a Day With Meals.    digoxin (LANOXIN) 125 MCG tablet 12/18/2018 Pharmacy Yes Yes    Take 125 mcg by mouth Daily.    furosemide (LASIX) 20 MG tablet 12/18/2018 Spouse/Significant Other Yes Yes    Take 20 mg by mouth Daily.    glyBURIDE (DIAbeta) 5 MG tablet 12/18/2018 Spouse/Significant Other Yes Yes    Take 5 mg by mouth 2 (Two) Times a Day.    losartan (COZAAR) 50 MG tablet 12/18/2018  Pharmacy Yes Yes    Take 50 mg by mouth Daily.    metFORMIN (GLUCOPHAGE) 1000 MG tablet 12/18/2018 Spouse/Significant Other Yes Yes    Take 1,000 mg by mouth 2 (Two) Times a Day With Meals.    pravastatin (PRAVACHOL) 20 MG tablet 12/18/2018 Spouse/Significant Other Yes Yes    Take 20 mg by mouth Every Night.    spironolactone (ALDACTONE) 25 MG tablet 12/18/2018 Spouse/Significant Other Yes Yes    Take 25 mg by mouth Daily.        ---------------------------------------------------------------------------------------------------------------------    Objective     Objective     Hospital Scheduled Meds:    enoxaparin 40 mg Subcutaneous Q24H   insulin aspart 0-9 Units Subcutaneous 4x Daily AC & at Bedtime   sodium chloride 3 mL Intravenous Q12H        ---------------------------------------------------------------------------------------------------------------------   Vital Signs:  Temp:  [98.6 °F (37 °C)-99 °F (37.2 °C)] 99 °F (37.2 °C)  Heart Rate:  [86-96] 86  Resp:  [18] 18  BP: (141-148)/(84-92) 144/84  Mean Arterial Pressure (Non-Invasive) for the past 24 hrs (Last 3 readings):   Noninvasive MAP (mmHg)   12/19/18 1522 100     SpO2 Percentage    12/19/18 1241 12/19/18 1522 12/19/18 1538   SpO2: 97% 98% 97%     SpO2:  [97 %-98 %] 97 %  on   ;   Device (Oxygen Therapy): room air    Body mass index is 36.7 kg/m².  Wt Readings from Last 3 Encounters:   12/19/18 113 kg (248 lb 8 oz)   01/08/17 112 kg (246 lb)     ---------------------------------------------------------------------------------------------------------------------     Physical Exam:    Constitutional:  Well-developed and well-nourished.  No respiratory distress.      HENT:  Head: Normocephalic and atraumatic.  Mouth:  Moist mucous membranes.    Eyes:  Conjunctivae and EOM are normal.  No scleral icterus.  Neck:  Neck supple.  No JVD present.    Cardiovascular:  Normal rate, regular rhythm and normal heart sounds with no murmur. No  edema.  Pulmonary/Chest:  No respiratory distress, no wheezes, no crackles, with normal breath sounds and good air movement.  Abdominal:  Soft.  Bowel sounds are normal.  No distension and no tenderness.   Musculoskeletal:  No edema, no tenderness, and no deformity.  No swelling or redness of joints.  Neurological:  Alert and oriented to person, place, and time.  No facial droop.  No slurred speech.   Skin:  Skin is warm and dry.  No rash noted.  No pallor.   Psychiatric:  Normal mood and affect.  Behavior is normal.    ---------------------------------------------------------------------------------------------------------------------  I have personally reviewed the EKG/Telemetry strip  ---------------------------------------------------------------------------------------------------------------------   Results from last 7 days   Lab Units  12/19/18   1256   TROPONIN I ng/mL  0.012           Results from last 7 days   Lab Units  12/19/18   1256   WBC 10*3/mm3  7.38   HEMOGLOBIN g/dL  14.0   HEMATOCRIT %  41.8*   MCV fL  86.9   MCHC g/dL  33.5   PLATELETS 10*3/mm3  290   INR   0.90     Results from last 7 days   Lab Units  12/19/18   1256   SODIUM mmol/L  137   POTASSIUM mmol/L  4.5   CHLORIDE mmol/L  103   CO2 mmol/L  25.9   BUN mg/dL  20   CREATININE mg/dL  0.75   EGFR IF NONAFRICN AM mL/min/1.73  110   CALCIUM mg/dL  9.4   GLUCOSE mg/dL  227*   ALBUMIN g/dL  4.20   BILIRUBIN mg/dL  0.8   ALK PHOS U/L  59   AST (SGOT) U/L  14   ALT (SGPT) U/L  20   Estimated Creatinine Clearance: 144.4 mL/min (by C-G formula based on SCr of 0.75 mg/dL).  No results found for: AMMONIA    Glucose   Date/Time Value Ref Range Status   12/19/2018 1318 207 (H) 70 - 130 mg/dL Final     No results found for: HGBA1C  No results found for: TSH, FREET4                   Pain Management Panel     There is no flowsheet data to display.        I have personally reviewed the above laboratory results.    ---------------------------------------------------------------------------------------------------------------------  Imaging Results (last 7 days)     Procedure Component Value Units Date/Time    XR Chest 1 View [[id number]] Collected:  12/19/18 1340     Updated:  12/19/18 1351    Narrative:       EXAMINATION: XR CHEST 1 VW-      CLINICAL INDICATION:     chest pressure, sob     TECHNIQUE:  XR CHEST 1 VW-      COMPARISON: NONE      FINDINGS:   The lungs remain aerated.  Heart and mediastinum contours are unremarkable.  No pleural effusion.  No pneumothorax.   Bony and soft tissue structures are unremarkable.       Impression:       No radiographic evidence of acute cardiac or pulmonary  disease.     This report was finalized on 12/19/2018 1:41 PM by Dr. Logan Mancera MD.           I have personally reviewed the above radiology results.   ---------------------------------------------------------------------------------------------------------------------      Assessment & Plan        Assessment/Plan       ASSESSMENT:    1. CHF exacerbation  2. Chest pain    PLAN:    Admitted to the observation unit for further evaluation. Cardiology consulted. NPO after midnight in case further testing is recommended. EKG shows normal sinus rhythm with possible left atrial enlargement. BNP elevated at 250.6 which is relatively stable compared to a year ago when it was 249.0. Nitropaste applied with resolved chest pain. First troponin normal. Will continue to follow troponin levels. Normal chest xray.    Lovenox 40 mg sq q24 for VTE prophylaxis.         Patient's findings and recommendations were discussed with patient, family and nursing staff      Code Status:   Code Status and Medical Interventions:   Ordered at: 12/19/18 1519     Code Status:    CPR     Medical Interventions (Level of Support Prior to Arrest):    Full           Liza Mckeon PA-C  12/19/18  4:08 PM

## 2018-12-19 NOTE — PLAN OF CARE
Problem: Patient Care Overview  Goal: Plan of Care Review  Outcome: Ongoing (interventions implemented as appropriate)    Goal: Individualization and Mutuality  Outcome: Ongoing (interventions implemented as appropriate)    Goal: Discharge Needs Assessment  Outcome: Ongoing (interventions implemented as appropriate)    Goal: Interprofessional Rounds/Family Conf  Outcome: Ongoing (interventions implemented as appropriate)      Problem: Cardiac Output Decreased (Adult)  Goal: Identify Related Risk Factors and Signs and Symptoms  Outcome: Ongoing (interventions implemented as appropriate)    Goal: Effective Tissue Perfusion  Outcome: Ongoing (interventions implemented as appropriate)      Problem: Diabetes, Type 2 (Adult)  Goal: Signs and Symptoms of Listed Potential Problems Will be Absent, Minimized or Managed (Diabetes, Type 2)  Outcome: Ongoing (interventions implemented as appropriate)      Problem: Skin Injury Risk (Adult)  Goal: Identify Related Risk Factors and Signs and Symptoms  Outcome: Ongoing (interventions implemented as appropriate)    Goal: Skin Health and Integrity  Outcome: Ongoing (interventions implemented as appropriate)    Goal: Identify Related Risk Factors and Signs and Symptoms  Outcome: Ongoing (interventions implemented as appropriate)    Goal: Skin Health and Integrity  Outcome: Ongoing (interventions implemented as appropriate)      Problem: Cardiac: ACS (Acute Coronary Syndrome) (Adult)  Goal: Signs and Symptoms of Listed Potential Problems Will be Absent, Minimized or Managed (Cardiac: ACS)  Outcome: Ongoing (interventions implemented as appropriate)

## 2018-12-19 NOTE — ED PROVIDER NOTES
Subjective   53-year-old male presents to the ED today for chest pain.  He states he has had intermittent chest pressure in the center of his chest for the last 2 weeks.  He is also complaining of shortness of breath.  He states the shortness of breath is worse when he lays down.  He states his symptoms became worse today while he was at work so he has come to the ER for an evaluation.  He states several years ago he had an episode where his ejection fraction went down to 5%.  He was placed on several different medicines and it improved.  He states he feels similar to when this occurred.  He was seen by his primary care provider yesterday and started on digoxin, Aldactone and Lasix.  He is scheduled for an echocardiogram after Christmas.  He states he currently follows with Dr. Holden as his cardiologist.        History provided by:  Patient  Chest Pain   Pain location:  Substernal area  Pain quality: pressure    Pain radiates to:  Does not radiate  Pain severity:  Moderate  Onset quality:  Gradual  Duration:  2 weeks  Timing:  Intermittent  Progression:  Worsening  Chronicity:  New  Relieved by:  Nothing  Worsened by:  Exertion  Associated symptoms: orthopnea and shortness of breath    Associated symptoms: no abdominal pain, no altered mental status, no anorexia, no anxiety, no back pain, no claudication, no cough, no diaphoresis, no dizziness, no dysphagia, no fatigue, no fever, no headache, no heartburn, no lower extremity edema, no nausea, no near-syncope, no numbness, no palpitations, no PND, no syncope, no vomiting and no weakness    Risk factors: diabetes mellitus, high cholesterol, hypertension, male sex and obesity    Risk factors: no coronary artery disease and no smoking        Review of Systems   Constitutional: Negative for diaphoresis, fatigue and fever.   HENT: Negative for trouble swallowing.    Eyes: Negative.    Respiratory: Positive for shortness of breath. Negative for cough.    Cardiovascular:  Positive for chest pain and orthopnea. Negative for palpitations, claudication, leg swelling, syncope, PND and near-syncope.   Gastrointestinal: Negative for abdominal pain, anorexia, heartburn, nausea and vomiting.   Genitourinary: Negative.    Musculoskeletal: Negative for back pain.   Skin: Negative.    Neurological: Negative for dizziness, weakness, numbness and headaches.   Psychiatric/Behavioral: Negative.    All other systems reviewed and are negative.      Past Medical History:   Diagnosis Date   • Diabetes mellitus (CMS/HCC)    • Hyperlipidemia    • Hypertension        No Known Allergies    Past Surgical History:   Procedure Laterality Date   • CARDIAC CATHETERIZATION      no stents       History reviewed. No pertinent family history.    Social History     Socioeconomic History   • Marital status:      Spouse name: Not on file   • Number of children: Not on file   • Years of education: Not on file   • Highest education level: Not on file   Tobacco Use   • Smoking status: Unknown If Ever Smoked   Substance and Sexual Activity   • Alcohol use: No   • Drug use: No   • Sexual activity: Defer           Objective   Physical Exam   Constitutional: He is oriented to person, place, and time. He appears well-developed and well-nourished. No distress.   HENT:   Head: Normocephalic and atraumatic.   Eyes: EOM are normal. Pupils are equal, round, and reactive to light.   Neck: Normal range of motion. Neck supple. No JVD present. No tracheal deviation present.   Cardiovascular: Normal rate, regular rhythm, intact distal pulses and normal pulses.   Pulmonary/Chest: Effort normal and breath sounds normal.   Abdominal: Soft. Bowel sounds are normal. There is no tenderness.   Musculoskeletal: Normal range of motion.        Right lower leg: Normal. He exhibits no edema.        Left lower leg: Normal. He exhibits no edema.   Neurological: He is alert and oriented to person, place, and time.   Skin: Skin is warm and dry.  Capillary refill takes less than 2 seconds.   Psychiatric: He has a normal mood and affect. His behavior is normal.   Nursing note and vitals reviewed.      Procedures           ED Course  ED Course as of Dec 19 1449   Wed Dec 19, 2018   1441 12:38 - sinus rhythm, rate of 92, LAD, no acute ischemia, reviewed by Dr. Mattson ECG 12 Lead []   1444 Patient is currently pain free, no distress.  I discussed with Liza mid-level with observation unit, plan is to admit for observation.  []      ED Course User Index  [] Giovana Price PA                HEART Score (for prediction of 6-week risk of major adverse cardiac event) reviewed and/or performed as part of the patient evaluation and treatment planning process.  The result associated with this review/performance is: 4       MDM  Number of Diagnoses or Management Options  Chest pain, unspecified type:      Amount and/or Complexity of Data Reviewed  Clinical lab tests: reviewed  Tests in the radiology section of CPT®: reviewed  Tests in the medicine section of CPT®: reviewed  Decide to obtain previous medical records or to obtain history from someone other than the patient: yes  Discuss the patient with other providers: yes    Patient Progress  Patient progress: stable        Final diagnoses:   Chest pain, unspecified type            Giovana Price PA  12/19/18 1449

## 2018-12-19 NOTE — PLAN OF CARE
Problem: Patient Care Overview  Goal: Interprofessional Rounds/Family Conf  Outcome: Ongoing (interventions implemented as appropriate)      Problem: Cardiac Output Decreased (Adult)  Goal: Identify Related Risk Factors and Signs and Symptoms  Outcome: Ongoing (interventions implemented as appropriate)    Goal: Effective Tissue Perfusion  Outcome: Ongoing (interventions implemented as appropriate)      Problem: Patient Care Overview  Goal: Plan of Care Review  Outcome: Ongoing (interventions implemented as appropriate)    Goal: Individualization and Mutuality  Outcome: Ongoing (interventions implemented as appropriate)    Goal: Discharge Needs Assessment  Outcome: Ongoing (interventions implemented as appropriate)    Goal: Interprofessional Rounds/Family Conf  Outcome: Ongoing (interventions implemented as appropriate)      Problem: Diabetes, Type 2 (Adult)  Goal: Signs and Symptoms of Listed Potential Problems Will be Absent, Minimized or Managed (Diabetes, Type 2)  Outcome: Ongoing (interventions implemented as appropriate)

## 2018-12-20 ENCOUNTER — APPOINTMENT (OUTPATIENT)
Dept: CARDIOLOGY | Facility: HOSPITAL | Age: 53
End: 2018-12-20
Attending: INTERNAL MEDICINE

## 2018-12-20 VITALS
RESPIRATION RATE: 18 BRPM | SYSTOLIC BLOOD PRESSURE: 128 MMHG | OXYGEN SATURATION: 97 % | DIASTOLIC BLOOD PRESSURE: 76 MMHG | HEIGHT: 69 IN | WEIGHT: 248.5 LBS | TEMPERATURE: 98.8 F | HEART RATE: 84 BPM | BODY MASS INDEX: 36.81 KG/M2

## 2018-12-20 LAB
ALBUMIN SERPL-MCNC: 3.8 G/DL (ref 3.5–5)
ALBUMIN/GLOB SERPL: 1.5 G/DL (ref 1.5–2.5)
ALP SERPL-CCNC: 56 U/L (ref 40–129)
ALT SERPL W P-5'-P-CCNC: 19 U/L (ref 10–44)
ANION GAP SERPL CALCULATED.3IONS-SCNC: 7 MMOL/L (ref 3.6–11.2)
AST SERPL-CCNC: 12 U/L (ref 10–34)
BASOPHILS # BLD AUTO: 0.09 10*3/MM3 (ref 0–0.3)
BASOPHILS NFR BLD AUTO: 1.1 % (ref 0–2)
BH CV ECHO MEAS - % IVS THICK: -6.4 %
BH CV ECHO MEAS - % LVPW THICK: 15.3 %
BH CV ECHO MEAS - ACS: 1.8 CM
BH CV ECHO MEAS - AO MAX PG (FULL): 1.5 MMHG
BH CV ECHO MEAS - AO MAX PG: 5.8 MMHG
BH CV ECHO MEAS - AO MEAN PG (FULL): 1.4 MMHG
BH CV ECHO MEAS - AO MEAN PG: 3.6 MMHG
BH CV ECHO MEAS - AO ROOT AREA (BSA CORRECTED): 1.4
BH CV ECHO MEAS - AO ROOT AREA: 8.3 CM^2
BH CV ECHO MEAS - AO ROOT DIAM: 3.3 CM
BH CV ECHO MEAS - AO V2 MAX: 120.8 CM/SEC
BH CV ECHO MEAS - AO V2 MEAN: 87.4 CM/SEC
BH CV ECHO MEAS - AO V2 VTI: 24 CM
BH CV ECHO MEAS - AVA(I,A): 2.7 CM^2
BH CV ECHO MEAS - AVA(I,D): 2.7 CM^2
BH CV ECHO MEAS - AVA(V,A): 3.2 CM^2
BH CV ECHO MEAS - AVA(V,D): 3.2 CM^2
BH CV ECHO MEAS - BSA(HAYCOCK): 2.4 M^2
BH CV ECHO MEAS - BSA: 2.3 M^2
BH CV ECHO MEAS - BZI_BMI: 36.6 KILOGRAMS/M^2
BH CV ECHO MEAS - BZI_METRIC_HEIGHT: 175.3 CM
BH CV ECHO MEAS - BZI_METRIC_WEIGHT: 112.5 KG
BH CV ECHO MEAS - EDV(CUBED): 434.7 ML
BH CV ECHO MEAS - EDV(TEICH): 305.1 ML
BH CV ECHO MEAS - EF(CUBED): 37.1 %
BH CV ECHO MEAS - EF(MOD-BP): 30 %
BH CV ECHO MEAS - EF(TEICH): 29.4 %
BH CV ECHO MEAS - ESV(CUBED): 273.4 ML
BH CV ECHO MEAS - ESV(TEICH): 215.3 ML
BH CV ECHO MEAS - FS: 14.3 %
BH CV ECHO MEAS - IVS/LVPW: 0.8
BH CV ECHO MEAS - IVSD: 1 CM
BH CV ECHO MEAS - IVSS: 0.96 CM
BH CV ECHO MEAS - LA DIMENSION: 3.8 CM
BH CV ECHO MEAS - LA/AO: 1.2
BH CV ECHO MEAS - LV MASS(C)D: 440.4 GRAMS
BH CV ECHO MEAS - LV MASS(C)DI: 194.6 GRAMS/M^2
BH CV ECHO MEAS - LV MASS(C)S: 363.9 GRAMS
BH CV ECHO MEAS - LV MASS(C)SI: 160.8 GRAMS/M^2
BH CV ECHO MEAS - LV MAX PG: 4.3 MMHG
BH CV ECHO MEAS - LV MEAN PG: 2.1 MMHG
BH CV ECHO MEAS - LV V1 MAX: 104.1 CM/SEC
BH CV ECHO MEAS - LV V1 MEAN: 67.2 CM/SEC
BH CV ECHO MEAS - LV V1 VTI: 17.3 CM
BH CV ECHO MEAS - LVIDD: 7.6 CM
BH CV ECHO MEAS - LVIDS: 6.5 CM
BH CV ECHO MEAS - LVOT AREA (M): 3.8 CM^2
BH CV ECHO MEAS - LVOT AREA: 3.7 CM^2
BH CV ECHO MEAS - LVOT DIAM: 2.2 CM
BH CV ECHO MEAS - LVPWD: 1.3 CM
BH CV ECHO MEAS - LVPWS: 1.5 CM
BH CV ECHO MEAS - MV A MAX VEL: 63.7 CM/SEC
BH CV ECHO MEAS - MV DEC SLOPE: 690.7 CM/SEC^2
BH CV ECHO MEAS - MV E MAX VEL: 101.2 CM/SEC
BH CV ECHO MEAS - MV E/A: 1.6
BH CV ECHO MEAS - MV P1/2T MAX VEL: 123.5 CM/SEC
BH CV ECHO MEAS - MV P1/2T: 52.4 MSEC
BH CV ECHO MEAS - MVA P1/2T LCG: 1.8 CM^2
BH CV ECHO MEAS - MVA(P1/2T): 4.2 CM^2
BH CV ECHO MEAS - PA ACC SLOPE: 899.5 CM/SEC^2
BH CV ECHO MEAS - PA ACC TIME: 0.1 SEC
BH CV ECHO MEAS - PA PR(ACCEL): 36.2 MMHG
BH CV ECHO MEAS - RVDD: 3.5 CM
BH CV ECHO MEAS - SI(AO): 88.3 ML/M^2
BH CV ECHO MEAS - SI(CUBED): 71.3 ML/M^2
BH CV ECHO MEAS - SI(LVOT): 28.3 ML/M^2
BH CV ECHO MEAS - SI(TEICH): 39.7 ML/M^2
BH CV ECHO MEAS - SV(AO): 199.8 ML
BH CV ECHO MEAS - SV(CUBED): 161.4 ML
BH CV ECHO MEAS - SV(LVOT): 64.1 ML
BH CV ECHO MEAS - SV(TEICH): 89.8 ML
BILIRUB SERPL-MCNC: 0.5 MG/DL (ref 0.2–1.8)
BNP SERPL-MCNC: 227 PG/ML (ref 0–100)
BUN BLD-MCNC: 21 MG/DL (ref 7–21)
BUN/CREAT SERPL: 26.9 (ref 7–25)
CALCIUM SPEC-SCNC: 8.9 MG/DL (ref 7.7–10)
CHLORIDE SERPL-SCNC: 103 MMOL/L (ref 99–112)
CHOLEST SERPL-MCNC: 177 MG/DL (ref 0–200)
CO2 SERPL-SCNC: 28 MMOL/L (ref 24.3–31.9)
CREAT BLD-MCNC: 0.78 MG/DL (ref 0.43–1.29)
CRP SERPL-MCNC: <0.5 MG/DL (ref 0–0.99)
DEPRECATED RDW RBC AUTO: 41.9 FL (ref 37–54)
EOSINOPHIL # BLD AUTO: 0.41 10*3/MM3 (ref 0–0.7)
EOSINOPHIL NFR BLD AUTO: 5 % (ref 0–5)
ERYTHROCYTE [DISTWIDTH] IN BLOOD BY AUTOMATED COUNT: 13.1 % (ref 11.5–14.5)
GFR SERPL CREATININE-BSD FRML MDRD: 105 ML/MIN/1.73
GLOBULIN UR ELPH-MCNC: 2.5 GM/DL
GLUCOSE BLD-MCNC: 247 MG/DL (ref 70–110)
GLUCOSE BLDC GLUCOMTR-MCNC: 230 MG/DL (ref 70–130)
HBA1C MFR BLD: 10 % (ref 4.5–5.7)
HCT VFR BLD AUTO: 40.7 % (ref 42–52)
HDLC SERPL-MCNC: 40 MG/DL (ref 60–100)
HGB BLD-MCNC: 13.4 G/DL (ref 14–18)
IMM GRANULOCYTES # BLD: 0.01 10*3/MM3 (ref 0–0.03)
IMM GRANULOCYTES NFR BLD: 0.1 % (ref 0–0.5)
LDLC SERPL CALC-MCNC: 81 MG/DL (ref 0–100)
LDLC/HDLC SERPL: 2.03 {RATIO}
LYMPHOCYTES # BLD AUTO: 3.24 10*3/MM3 (ref 1–3)
LYMPHOCYTES NFR BLD AUTO: 39.8 % (ref 21–51)
MAXIMAL PREDICTED HEART RATE: 169 BPM
MCH RBC QN AUTO: 29.4 PG (ref 27–33)
MCHC RBC AUTO-ENTMCNC: 32.9 G/DL (ref 33–37)
MCV RBC AUTO: 89.3 FL (ref 80–94)
MONOCYTES # BLD AUTO: 0.7 10*3/MM3 (ref 0.1–0.9)
MONOCYTES NFR BLD AUTO: 8.6 % (ref 0–10)
NEUTROPHILS # BLD AUTO: 3.69 10*3/MM3 (ref 1.4–6.5)
NEUTROPHILS NFR BLD AUTO: 45.4 % (ref 30–70)
OSMOLALITY SERPL CALC.SUM OF ELEC: 286.9 MOSM/KG (ref 273–305)
PLATELET # BLD AUTO: 291 10*3/MM3 (ref 130–400)
PMV BLD AUTO: 9.7 FL (ref 6–10)
POTASSIUM BLD-SCNC: 4.1 MMOL/L (ref 3.5–5.3)
PROT SERPL-MCNC: 6.3 G/DL (ref 6–8)
RBC # BLD AUTO: 4.56 10*6/MM3 (ref 4.7–6.1)
SODIUM BLD-SCNC: 138 MMOL/L (ref 135–153)
STRESS TARGET HR: 144 BPM
TRIGL SERPL-MCNC: 280 MG/DL (ref 0–150)
TROPONIN I SERPL-MCNC: 0.01 NG/ML
TSH SERPL DL<=0.05 MIU/L-ACNC: 0.81 MIU/ML (ref 0.55–4.78)
VLDLC SERPL-MCNC: 56 MG/DL
WBC NRBC COR # BLD: 8.14 10*3/MM3 (ref 4.5–12.5)

## 2018-12-20 PROCEDURE — 85025 COMPLETE CBC W/AUTO DIFF WBC: CPT | Performed by: INTERNAL MEDICINE

## 2018-12-20 PROCEDURE — G0378 HOSPITAL OBSERVATION PER HR: HCPCS

## 2018-12-20 PROCEDURE — 80053 COMPREHEN METABOLIC PANEL: CPT | Performed by: INTERNAL MEDICINE

## 2018-12-20 PROCEDURE — 84484 ASSAY OF TROPONIN QUANT: CPT | Performed by: INTERNAL MEDICINE

## 2018-12-20 PROCEDURE — 93306 TTE W/DOPPLER COMPLETE: CPT

## 2018-12-20 PROCEDURE — 86140 C-REACTIVE PROTEIN: CPT | Performed by: PHYSICIAN ASSISTANT

## 2018-12-20 PROCEDURE — 63710000001 INSULIN ASPART PER 5 UNITS: Performed by: INTERNAL MEDICINE

## 2018-12-20 PROCEDURE — 93306 TTE W/DOPPLER COMPLETE: CPT | Performed by: INTERNAL MEDICINE

## 2018-12-20 PROCEDURE — 84443 ASSAY THYROID STIM HORMONE: CPT | Performed by: INTERNAL MEDICINE

## 2018-12-20 PROCEDURE — 99213 OFFICE O/P EST LOW 20 MIN: CPT | Performed by: INTERNAL MEDICINE

## 2018-12-20 PROCEDURE — 83880 ASSAY OF NATRIURETIC PEPTIDE: CPT | Performed by: INTERNAL MEDICINE

## 2018-12-20 PROCEDURE — 80061 LIPID PANEL: CPT | Performed by: INTERNAL MEDICINE

## 2018-12-20 PROCEDURE — 82962 GLUCOSE BLOOD TEST: CPT

## 2018-12-20 PROCEDURE — 83036 HEMOGLOBIN GLYCOSYLATED A1C: CPT | Performed by: INTERNAL MEDICINE

## 2018-12-20 RX ORDER — NITROGLYCERIN 0.4 MG/1
0.4 TABLET SUBLINGUAL
Qty: 25 TABLET | Refills: 12 | Status: ON HOLD | OUTPATIENT
Start: 2018-12-20 | End: 2021-10-05

## 2018-12-20 RX ADMIN — SPIRONOLACTONE 25 MG: 25 TABLET ORAL at 09:52

## 2018-12-20 RX ADMIN — METFORMIN HYDROCHLORIDE 1000 MG: 500 TABLET ORAL at 09:53

## 2018-12-20 RX ADMIN — INSULIN ASPART 4 UNITS: 100 INJECTION, SOLUTION INTRAVENOUS; SUBCUTANEOUS at 09:52

## 2018-12-20 RX ADMIN — LOSARTAN POTASSIUM 50 MG: 50 TABLET, FILM COATED ORAL at 09:52

## 2018-12-20 RX ADMIN — CARVEDILOL 6.25 MG: 6.25 TABLET, FILM COATED ORAL at 09:52

## 2018-12-20 RX ADMIN — FUROSEMIDE 20 MG: 20 TABLET ORAL at 09:52

## 2018-12-20 RX ADMIN — GLIPIZIDE 5 MG: 5 TABLET ORAL at 09:53

## 2018-12-20 RX ADMIN — SODIUM CHLORIDE, PRESERVATIVE FREE 3 ML: 5 INJECTION INTRAVENOUS at 09:53

## 2018-12-20 NOTE — PLAN OF CARE
Problem: Cardiac Output Decreased (Adult)  Goal: Identify Related Risk Factors and Signs and Symptoms  Outcome: Ongoing (interventions implemented as appropriate)      Problem: Diabetes, Type 2 (Adult)  Goal: Signs and Symptoms of Listed Potential Problems Will be Absent, Minimized or Managed (Diabetes, Type 2)  Outcome: Ongoing (interventions implemented as appropriate)

## 2018-12-20 NOTE — PROGRESS NOTES
Name: Cas Mccabe  Age/Sex: 53 y.o. male  :  1965        PCP: Girish Leary MD    Subjective   Dyspnea improved.  Echo results discussed with patient and his wife.  LVEF=30%    Objective   Vital Signs  Temp:  [98 °F (36.7 °C)-99 °F (37.2 °C)] 98.2 °F (36.8 °C)  Heart Rate:  [80-96] 91  Resp:  [18] 18  BP: (141-168)/(82-95) 152/89  Body mass index is 36.7 kg/m².      PEX: Obese  Neck: Supple no JVD  Lung: CTA B   COR: RRR No m/r/g  Abd: Soft NT ND  Ext: No CCE, DP intact B      Objective    Results Review:       I reviewed the patient's new clinical results.  Results from last 7 days   Lab Units  18   0355  18   1256   WBC 10*3/mm3  8.14  7.38   HEMOGLOBIN g/dL  13.4*  14.0   PLATELETS 10*3/mm3  291  290     Results from last 7 days   Lab Units  18   0355  18   1256   SODIUM mmol/L  138  137   POTASSIUM mmol/L  4.1  4.5   CHLORIDE mmol/L  103  103   CO2 mmol/L  28.0  25.9   BUN mg/dL  21  20   CREATININE mg/dL  0.78  0.75   CALCIUM mg/dL  8.9  9.4   GLUCOSE mg/dL  247*  227*   Estimated Creatinine Clearance: 138.8 mL/min (by C-G formula based on SCr of 0.78 mg/dL).  Imaging Results (last 72 hours)     Procedure Component Value Units Date/Time    XR Chest 1 View [[id number]] Collected:  18 1340     Updated:  18 1350    Narrative:       EXAMINATION: XR CHEST 1 VW-      CLINICAL INDICATION:     chest pressure, sob     TECHNIQUE:  XR CHEST 1 VW-      COMPARISON: NONE      FINDINGS:   The lungs remain aerated.  Heart and mediastinum contours are unremarkable.  No pleural effusion.  No pneumothorax.   Bony and soft tissue structures are unremarkable.       Impression:       No radiographic evidence of acute cardiac or pulmonary  disease.     This report was finalized on 2018 1:41 PM by Dr. Logan Mancera MD.               Assessment/Plan     Chest pain  Acute on chronic combined CHF    PLAN     Continue current therapy, Stress test as outpatiet follow up with   [facility].  Need for lifelong medical therapy discussed in detail.        Leona De Dios MD  12/20/18  10:06 AM

## 2018-12-20 NOTE — PLAN OF CARE
Problem: Cardiac Output Decreased (Adult)  Goal: Identify Related Risk Factors and Signs and Symptoms  Outcome: Ongoing (interventions implemented as appropriate)    Goal: Effective Tissue Perfusion  Outcome: Ongoing (interventions implemented as appropriate)      Problem: Patient Care Overview  Goal: Plan of Care Review  Outcome: Ongoing (interventions implemented as appropriate)    Goal: Individualization and Mutuality  Outcome: Ongoing (interventions implemented as appropriate)    Goal: Discharge Needs Assessment  Outcome: Ongoing (interventions implemented as appropriate)    Goal: Interprofessional Rounds/Family Conf  Outcome: Ongoing (interventions implemented as appropriate)      Problem: Diabetes, Type 2 (Adult)  Goal: Signs and Symptoms of Listed Potential Problems Will be Absent, Minimized or Managed (Diabetes, Type 2)  Outcome: Ongoing (interventions implemented as appropriate)      Problem: Skin Injury Risk (Adult)  Goal: Identify Related Risk Factors and Signs and Symptoms  Outcome: Ongoing (interventions implemented as appropriate)    Goal: Skin Health and Integrity  Outcome: Ongoing (interventions implemented as appropriate)    Goal: Identify Related Risk Factors and Signs and Symptoms  Outcome: Ongoing (interventions implemented as appropriate)    Goal: Skin Health and Integrity  Outcome: Ongoing (interventions implemented as appropriate)      Problem: Cardiac: ACS (Acute Coronary Syndrome) (Adult)  Goal: Signs and Symptoms of Listed Potential Problems Will be Absent, Minimized or Managed (Cardiac: ACS)  Outcome: Ongoing (interventions implemented as appropriate)

## 2018-12-20 NOTE — DISCHARGE SUMMARY
DISCHARGE SUMMARY        Patient Identification:  Name:  Cas Mccabe  Age:  53 y.o.  Sex:  male  :  1965  MRN:  [id number]  Visit Number:  [id number]    Date of Admission: 2018  Date of Discharge:  2018    PCP: Girish Leary MD    Discharging Provider: ROSA MARIA Pagan      Discharge Diagnoses     Chest pain, resolved  CHF exacerbation, improved      Consults/Procedures     Consults:   Consults     Date and Time Order Name Status Description    2018 1526 Inpatient Cardiology Consult      2018 1438 IP General Consult (Use specialty-specific consult if known)            Procedures Performed:         History of Presenting Illness     Patient is a 53 y.o. male presented to [facility] complaining of chest pain.  Please see the admitting history and physical for further details.      Hospital Course     Patient was admitted to the Observation unit for further testing and cardiac monitoring. Lab work remained stable during hospital stay. Chest pain resolved. Cardiology followed patient during stay. 2D Echo was completed revealing an EF of 30%. Cardiology recommended outpatient follow up with Dr. Holden, for outpatient stress test, and continue current medication regimen. Patient deemed stable for discharge with outpatient follow up with PCP in 3-4 days, and outpatient follow-up with cardiology for possible need for further testing. May return to work upon being released from Cardiology.    Discharge Vitals/Physical Examination     Vital Signs:  Temp:  [98 °F (36.7 °C)-99 °F (37.2 °C)] 98.2 °F (36.8 °C)  Heart Rate:  [80-96] 91  Resp:  [18] 18  BP: (141-168)/(82-95) 152/89  Mean Arterial Pressure (Non-Invasive) for the past 24 hrs (Last 3 readings):   Noninvasive MAP (mmHg)   18 0800 109   18 0423 115   18 2355 106     SpO2 Percentage    18 2355 18 0423 18 0800   SpO2: 96% 97% 96%     SpO2:  [96 %-98 %] 96 %  on   ;   Device  (Oxygen Therapy): room air    Body mass index is 36.7 kg/m².  Wt Readings from Last 3 Encounters:   12/19/18 113 kg (248 lb 8 oz)   01/08/17 112 kg (246 lb)         Physical Exam:    Constitutional:  Well-developed and well-nourished.  No respiratory distress.      HENT:  Head: Normocephalic and atraumatic.  Mouth:  Moist mucous membranes.    Eyes:  Conjunctivae and EOM are normal.  No scleral icterus.  Neck:  Neck supple.  No JVD present.    Cardiovascular:  Normal rate, regular rhythm and normal heart sounds with no murmur. No edema.  Pulmonary/Chest:  No respiratory distress, no wheezes, no crackles, with normal breath sounds and good air movement.  Abdominal:  Soft.  Bowel sounds are normal.  No distension and no tenderness.   Musculoskeletal:  No edema, no tenderness, and no deformity.  No swelling or redness of joints.  Neurological:  Alert and oriented to person, place, and time.  No facial droop.  No slurred speech.   Skin:  Skin is warm and dry.  No rash noted.  No pallor.   Psychiatric:  Normal mood and affect.  Behavior is normal.      Pertinent Laboratory/Radiology Results     Pertinent Laboratory Results:    Results from last 7 days   Lab Units  12/20/18   0355  12/19/18   2057  12/19/18   1624   TROPONIN I ng/mL  0.011  0.013  <0.006       Results from last 7 days   Lab Units  12/20/18   0355   CHOLESTEROL mg/dL  177   TRIGLYCERIDES mg/dL  280*   HDL CHOL mg/dL  40*   LDL CHOL mg/dL  81       Results from last 7 days   Lab Units  12/20/18   0355  12/19/18   1256   CRP mg/dL  <0.50   --    WBC 10*3/mm3  8.14  7.38   HEMOGLOBIN g/dL  13.4*  14.0   HEMATOCRIT %  40.7*  41.8*   MCV fL  89.3  86.9   MCHC g/dL  32.9*  33.5   PLATELETS 10*3/mm3  291  290   INR    --   0.90     Results from last 7 days   Lab Units  12/20/18   0355  12/19/18   1256   SODIUM mmol/L  138  137   POTASSIUM mmol/L  4.1  4.5   CHLORIDE mmol/L  103  103   CO2 mmol/L  28.0  25.9   BUN mg/dL  21  20   CREATININE mg/dL  0.78  0.75   EGFR  IF NONAFRICN AM mL/min/1.73  105  110   CALCIUM mg/dL  8.9  9.4   GLUCOSE mg/dL  247*  227*   ALBUMIN g/dL  3.80  4.20   BILIRUBIN mg/dL  0.5  0.8   ALK PHOS U/L  56  59   AST (SGOT) U/L  12  14   ALT (SGPT) U/L  19  20   Estimated Creatinine Clearance: 138.8 mL/min (by C-G formula based on SCr of 0.78 mg/dL).  No results found for: AMMONIA    Hemoglobin A1C   Date/Time Value Ref Range Status   12/20/2018 0355 10.00 (H) 4.50 - 5.70 % Final     Glucose   Date/Time Value Ref Range Status   12/19/2018 2153 223 (H) 70 - 130 mg/dL Final   12/19/2018 1930 284 (H) 70 - 130 mg/dL Final   12/19/2018 1615 288 (H) 70 - 130 mg/dL Final   12/19/2018 1318 207 (H) 70 - 130 mg/dL Final     Lab Results   Component Value Date    HGBA1C 10.00 (H) 12/20/2018     Lab Results   Component Value Date    TSH 0.811 12/20/2018                      Pain Management Panel     There is no flowsheet data to display.          Pertinent Radiology Results:  Imaging Results (all)     Procedure Component Value Units Date/Time    XR Chest 1 View [[id number]] Collected:  12/19/18 1340     Updated:  12/19/18 1350    Narrative:       EXAMINATION: XR CHEST 1 VW-      CLINICAL INDICATION:     chest pressure, sob     TECHNIQUE:  XR CHEST 1 VW-      COMPARISON: NONE      FINDINGS:   The lungs remain aerated.  Heart and mediastinum contours are unremarkable.  No pleural effusion.  No pneumothorax.   Bony and soft tissue structures are unremarkable.       Impression:       No radiographic evidence of acute cardiac or pulmonary  disease.     This report was finalized on 12/19/2018 1:41 PM by Dr. Logan Mancera MD.             Test Results Pending at Discharge:      Discharge Disposition/Discharge Medications/Discharge Appointments     Discharge Disposition:   Home or Self Care    Condition at Discharge:  Stable, much improved with no issues today     Code Status While Inpatient:  Code Status and Medical Interventions:   Ordered at: 12/19/18 1519     Code Status:     CPR     Medical Interventions (Level of Support Prior to Arrest):    Full       Discharge Medications:     Discharge Medications      New Medications      Instructions Start Date   nitroglycerin 0.4 MG SL tablet  Commonly known as:  NITROSTAT   0.4 mg, Sublingual, Every 5 Minutes PRN         Continue These Medications      Instructions Start Date   carvedilol 12.5 MG tablet  Commonly known as:  COREG   6.25 mg, Oral, 2 Times Daily With Meals      digoxin 125 MCG tablet  Commonly known as:  LANOXIN   125 mcg, Oral, Daily Digoxin      furosemide 20 MG tablet  Commonly known as:  LASIX   20 mg, Oral, Daily      glyBURIDE 5 MG tablet  Commonly known as:  DIAbeta   5 mg, Oral, 2 Times Daily      losartan 50 MG tablet  Commonly known as:  COZAAR   50 mg, Oral, Daily      metFORMIN 1000 MG tablet  Commonly known as:  GLUCOPHAGE   1,000 mg, Oral, 2 Times Daily With Meals      pravastatin 20 MG tablet  Commonly known as:  PRAVACHOL   20 mg, Oral, Nightly      spironolactone 25 MG tablet  Commonly known as:  ALDACTONE   25 mg, Oral, Daily             Discharge Diet:  Cardiac, Consistent Carbohydrate    Discharge Activity:  As tolerated    Discharge Appointments:  Your Scheduled Appointments    Dec 27, 2018  2:30 PM EST  ECHOCARDIOGRAM 2D COMPLETE VISIT with [facility] ECHO/VAS CART 2  [facility] ([place]) [address] [place] [id number] [phone]   Bring your insurance cards with you. Wear comfortable clothing and shoes.          Follow-up Information     Girish Leary MD. Schedule an appointment as soon as possible for a visit in 3 day(s).    Specialty:  Family Medicine  Contact information:  [address] [place] [address]  [phone]             Cory Holden MD. Schedule an appointment as soon as possible for a visit in 1 week(s).    Specialty:  Cardiology  Why:  For outpatient stress test  Contact information:  [address] [facility]  [address]  [phone]                           ROSA MARIA Pagan  12/20/18  10:34 AM          Please note that this discharge summary required more than 30 minutes to complete.

## 2018-12-20 NOTE — PLAN OF CARE
Problem: Patient Care Overview  Goal: Plan of Care Review  Outcome: Ongoing (interventions implemented as appropriate)    Goal: Individualization and Mutuality  Outcome: Ongoing (interventions implemented as appropriate)    Goal: Discharge Needs Assessment  Outcome: Ongoing (interventions implemented as appropriate)    Goal: Interprofessional Rounds/Family Conf  Outcome: Ongoing (interventions implemented as appropriate)      Problem: Cardiac Output Decreased (Adult)  Goal: Identify Related Risk Factors and Signs and Symptoms  Outcome: Ongoing (interventions implemented as appropriate)    Goal: Effective Tissue Perfusion  Outcome: Ongoing (interventions implemented as appropriate)      Problem: Patient Care Overview  Goal: Plan of Care Review  Outcome: Ongoing (interventions implemented as appropriate)    Goal: Individualization and Mutuality  Outcome: Ongoing (interventions implemented as appropriate)    Goal: Discharge Needs Assessment  Outcome: Ongoing (interventions implemented as appropriate)    Goal: Interprofessional Rounds/Family Conf  Outcome: Ongoing (interventions implemented as appropriate)      Problem: Diabetes, Type 2 (Adult)  Goal: Signs and Symptoms of Listed Potential Problems Will be Absent, Minimized or Managed (Diabetes, Type 2)  Outcome: Ongoing (interventions implemented as appropriate)      Problem: Skin Injury Risk (Adult)  Goal: Identify Related Risk Factors and Signs and Symptoms  Outcome: Ongoing (interventions implemented as appropriate)    Goal: Skin Health and Integrity  Outcome: Ongoing (interventions implemented as appropriate)    Goal: Identify Related Risk Factors and Signs and Symptoms  Outcome: Ongoing (interventions implemented as appropriate)    Goal: Skin Health and Integrity  Outcome: Ongoing (interventions implemented as appropriate)      Problem: Cardiac: ACS (Acute Coronary Syndrome) (Adult)  Goal: Signs and Symptoms of Listed Potential Problems Will be Absent, Minimized  or Managed (Cardiac: ACS)  Outcome: Ongoing (interventions implemented as appropriate)

## 2019-01-08 PROBLEM — E78.5 HYPERLIPIDEMIA: Status: ACTIVE | Noted: 2017-07-07

## 2019-01-08 PROBLEM — E11.9 TYPE 2 DIABETES MELLITUS (HCC): Status: ACTIVE | Noted: 2017-07-07

## 2019-01-08 PROBLEM — I10 HYPERTENSIVE DISORDER: Status: ACTIVE | Noted: 2017-07-07

## 2019-01-08 PROBLEM — I42.0 NONISCHEMIC CONGESTIVE CARDIOMYOPATHY (HCC): Status: ACTIVE | Noted: 2017-07-07

## 2019-01-08 PROBLEM — M25.512 PAIN IN JOINT OF LEFT SHOULDER: Status: ACTIVE | Noted: 2017-07-07

## 2020-10-29 ENCOUNTER — TRANSCRIBE ORDERS (OUTPATIENT)
Dept: ADMINISTRATIVE | Facility: HOSPITAL | Age: 55
End: 2020-10-29

## 2020-10-29 DIAGNOSIS — Z11.59 ENCOUNTER FOR SCREENING FOR OTHER VIRAL DISEASES: Primary | ICD-10-CM

## 2020-11-23 ENCOUNTER — TRANSCRIBE ORDERS (OUTPATIENT)
Dept: ADMINISTRATIVE | Facility: HOSPITAL | Age: 55
End: 2020-11-23

## 2020-11-23 DIAGNOSIS — Z11.59 ENCOUNTER FOR SCREENING FOR OTHER VIRAL DISEASES: Primary | ICD-10-CM

## 2020-11-27 ENCOUNTER — LAB (OUTPATIENT)
Dept: LAB | Facility: HOSPITAL | Age: 55
End: 2020-11-27

## 2020-11-27 DIAGNOSIS — Z11.59 ENCOUNTER FOR SCREENING FOR OTHER VIRAL DISEASES: ICD-10-CM

## 2021-01-19 ENCOUNTER — TRANSCRIBE ORDERS (OUTPATIENT)
Dept: ADMINISTRATIVE | Facility: HOSPITAL | Age: 56
End: 2021-01-19

## 2021-01-19 ENCOUNTER — LAB (OUTPATIENT)
Dept: LAB | Facility: HOSPITAL | Age: 56
End: 2021-01-19

## 2021-01-19 DIAGNOSIS — Z20.828 EXPOSURE TO SARS-ASSOCIATED CORONAVIRUS: ICD-10-CM

## 2021-01-19 DIAGNOSIS — Z20.828 EXPOSURE TO SARS-ASSOCIATED CORONAVIRUS: Primary | ICD-10-CM

## 2021-01-19 PROCEDURE — U0004 COV-19 TEST NON-CDC HGH THRU: HCPCS | Performed by: NURSE PRACTITIONER

## 2021-01-19 PROCEDURE — C9803 HOPD COVID-19 SPEC COLLECT: HCPCS

## 2021-01-20 LAB — SARS-COV-2 RNA RESP QL NAA+PROBE: NOT DETECTED

## 2021-03-15 ENCOUNTER — IMMUNIZATION (OUTPATIENT)
Dept: VACCINE CLINIC | Facility: HOSPITAL | Age: 56
End: 2021-03-15

## 2021-03-15 PROCEDURE — 0001A: CPT | Performed by: INTERNAL MEDICINE

## 2021-03-15 PROCEDURE — 91300 HC SARSCOV02 VAC 30MCG/0.3ML IM: CPT | Performed by: INTERNAL MEDICINE

## 2021-04-05 ENCOUNTER — IMMUNIZATION (OUTPATIENT)
Dept: VACCINE CLINIC | Facility: HOSPITAL | Age: 56
End: 2021-04-05

## 2021-04-05 PROCEDURE — 91300 HC SARSCOV02 VAC 30MCG/0.3ML IM: CPT | Performed by: INTERNAL MEDICINE

## 2021-04-05 PROCEDURE — 0002A: CPT | Performed by: INTERNAL MEDICINE

## 2021-10-05 ENCOUNTER — APPOINTMENT (OUTPATIENT)
Dept: CT IMAGING | Facility: HOSPITAL | Age: 56
End: 2021-10-05

## 2021-10-05 ENCOUNTER — APPOINTMENT (OUTPATIENT)
Dept: GENERAL RADIOLOGY | Facility: HOSPITAL | Age: 56
End: 2021-10-05

## 2021-10-05 ENCOUNTER — APPOINTMENT (OUTPATIENT)
Dept: MRI IMAGING | Facility: HOSPITAL | Age: 56
End: 2021-10-05

## 2021-10-05 ENCOUNTER — HOSPITAL ENCOUNTER (INPATIENT)
Facility: HOSPITAL | Age: 56
LOS: 2 days | Discharge: HOME | End: 2021-10-07
Attending: STUDENT IN AN ORGANIZED HEALTH CARE EDUCATION/TRAINING PROGRAM | Admitting: INTERNAL MEDICINE

## 2021-10-05 DIAGNOSIS — E11.65 TYPE 2 DIABETES MELLITUS WITH HYPERGLYCEMIA, WITHOUT LONG-TERM CURRENT USE OF INSULIN (HCC): ICD-10-CM

## 2021-10-05 DIAGNOSIS — I63.9 OCCIPITAL INFARCTION (HCC): Primary | ICD-10-CM

## 2021-10-05 PROBLEM — A41.9 SEPSIS DUE TO PNEUMONIA (HCC): Status: ACTIVE | Noted: 2021-10-05

## 2021-10-05 PROBLEM — J18.9 SEPSIS DUE TO PNEUMONIA: Status: ACTIVE | Noted: 2021-10-05

## 2021-10-05 LAB
A-A DO2: 31.3 MMHG (ref 0–300)
ALBUMIN SERPL-MCNC: 3.7 G/DL (ref 3.5–5.2)
ALBUMIN/GLOB SERPL: 1.1 G/DL
ALP SERPL-CCNC: 65 U/L (ref 39–117)
ALT SERPL W P-5'-P-CCNC: 17 U/L (ref 1–41)
ANION GAP SERPL CALCULATED.3IONS-SCNC: 8.8 MMOL/L (ref 5–15)
APTT PPP: 27.4 SECONDS (ref 25.5–35.4)
ARTERIAL PATENCY WRIST A: ABNORMAL
AST SERPL-CCNC: 12 U/L (ref 1–40)
ATMOSPHERIC PRESS: 729 MMHG
BASE EXCESS BLDA CALC-SCNC: 0.6 MMOL/L (ref 0–2)
BASOPHILS # BLD AUTO: 0.11 10*3/MM3 (ref 0–0.2)
BASOPHILS NFR BLD AUTO: 1 % (ref 0–1.5)
BDY SITE: ABNORMAL
BILIRUB SERPL-MCNC: 0.8 MG/DL (ref 0–1.2)
BODY TEMPERATURE: 0 C
BUN SERPL-MCNC: 23 MG/DL (ref 6–20)
BUN/CREAT SERPL: 29.1 (ref 7–25)
CALCIUM SPEC-SCNC: 9.6 MG/DL (ref 8.6–10.5)
CHLORIDE SERPL-SCNC: 95 MMOL/L (ref 98–107)
CO2 BLDA-SCNC: 26 MMOL/L (ref 22–33)
CO2 SERPL-SCNC: 25.2 MMOL/L (ref 22–29)
COHGB MFR BLD: 1.3 % (ref 0–5)
CREAT SERPL-MCNC: 0.79 MG/DL (ref 0.76–1.27)
DEPRECATED RDW RBC AUTO: 38.9 FL (ref 37–54)
DIGOXIN SERPL-MCNC: <0.3 NG/ML (ref 0.6–1.2)
EOSINOPHIL # BLD AUTO: 0.36 10*3/MM3 (ref 0–0.4)
EOSINOPHIL NFR BLD AUTO: 3.1 % (ref 0.3–6.2)
ERYTHROCYTE [DISTWIDTH] IN BLOOD BY AUTOMATED COUNT: 12.1 % (ref 12.3–15.4)
FLUAV SUBTYP SPEC NAA+PROBE: NOT DETECTED
FLUBV RNA ISLT QL NAA+PROBE: NOT DETECTED
GFR SERPL CREATININE-BSD FRML MDRD: 102 ML/MIN/1.73
GLOBULIN UR ELPH-MCNC: 3.4 GM/DL
GLUCOSE BLDC GLUCOMTR-MCNC: 309 MG/DL (ref 70–130)
GLUCOSE SERPL-MCNC: 315 MG/DL (ref 65–99)
HCO3 BLDA-SCNC: 24.8 MMOL/L (ref 20–26)
HCT VFR BLD AUTO: 41.6 % (ref 37.5–51)
HCT VFR BLD CALC: 44 % (ref 38–51)
HGB BLD-MCNC: 13.7 G/DL (ref 13–17.7)
HGB BLDA-MCNC: 14.4 G/DL (ref 14–18)
HOLD SPECIMEN: NORMAL
HOLD SPECIMEN: NORMAL
IMM GRANULOCYTES # BLD AUTO: 0.02 10*3/MM3 (ref 0–0.05)
IMM GRANULOCYTES NFR BLD AUTO: 0.2 % (ref 0–0.5)
INHALED O2 CONCENTRATION: 21 %
INR PPP: 0.9 (ref 0.9–1.1)
LIPASE SERPL-CCNC: 51 U/L (ref 13–60)
LYMPHOCYTES # BLD AUTO: 2.62 10*3/MM3 (ref 0.7–3.1)
LYMPHOCYTES NFR BLD AUTO: 22.7 % (ref 19.6–45.3)
Lab: ABNORMAL
MAGNESIUM SERPL-MCNC: 1.5 MG/DL (ref 1.6–2.6)
MCH RBC QN AUTO: 28.7 PG (ref 26.6–33)
MCHC RBC AUTO-ENTMCNC: 32.9 G/DL (ref 31.5–35.7)
MCV RBC AUTO: 87.2 FL (ref 79–97)
METHGB BLD QL: 0.1 % (ref 0–3)
MODALITY: ABNORMAL
MONOCYTES # BLD AUTO: 0.67 10*3/MM3 (ref 0.1–0.9)
MONOCYTES NFR BLD AUTO: 5.8 % (ref 5–12)
NEUTROPHILS NFR BLD AUTO: 67.2 % (ref 42.7–76)
NEUTROPHILS NFR BLD AUTO: 7.78 10*3/MM3 (ref 1.7–7)
NOTE: ABNORMAL
NRBC BLD AUTO-RTO: 0 /100 WBC (ref 0–0.2)
NT-PROBNP SERPL-MCNC: 1542 PG/ML (ref 0–900)
OXYHGB MFR BLDV: 93.8 % (ref 94–99)
PCO2 BLDA: 37.8 MM HG (ref 35–45)
PCO2 TEMP ADJ BLD: ABNORMAL MM[HG]
PH BLDA: 7.42 PH UNITS (ref 7.35–7.45)
PH, TEMP CORRECTED: ABNORMAL
PLATELET # BLD AUTO: 332 10*3/MM3 (ref 140–450)
PMV BLD AUTO: 8.8 FL (ref 6–12)
PO2 BLDA: 69.1 MM HG (ref 83–108)
PO2 TEMP ADJ BLD: ABNORMAL MM[HG]
POTASSIUM SERPL-SCNC: 4.7 MMOL/L (ref 3.5–5.2)
PROT SERPL-MCNC: 7.1 G/DL (ref 6–8.5)
PROTHROMBIN TIME: 12.6 SECONDS (ref 12.8–14.5)
RBC # BLD AUTO: 4.77 10*6/MM3 (ref 4.14–5.8)
SAO2 % BLDCOA: 95.1 % (ref 94–99)
SARS-COV-2 RNA PNL SPEC NAA+PROBE: NOT DETECTED
SODIUM SERPL-SCNC: 129 MMOL/L (ref 136–145)
TROPONIN T SERPL-MCNC: <0.01 NG/ML (ref 0–0.03)
TROPONIN T SERPL-MCNC: <0.01 NG/ML (ref 0–0.03)
VENTILATOR MODE: ABNORMAL
WBC # BLD AUTO: 11.56 10*3/MM3 (ref 3.4–10.8)
WHOLE BLOOD HOLD SPECIMEN: NORMAL
WHOLE BLOOD HOLD SPECIMEN: NORMAL

## 2021-10-05 PROCEDURE — 82375 ASSAY CARBOXYHB QUANT: CPT

## 2021-10-05 PROCEDURE — 0 GADOBENATE DIMEGLUMINE 529 MG/ML SOLUTION: Performed by: STUDENT IN AN ORGANIZED HEALTH CARE EDUCATION/TRAINING PROGRAM

## 2021-10-05 PROCEDURE — 71045 X-RAY EXAM CHEST 1 VIEW: CPT

## 2021-10-05 PROCEDURE — 70553 MRI BRAIN STEM W/O & W/DYE: CPT | Performed by: RADIOLOGY

## 2021-10-05 PROCEDURE — 83735 ASSAY OF MAGNESIUM: CPT | Performed by: PHYSICIAN ASSISTANT

## 2021-10-05 PROCEDURE — 87040 BLOOD CULTURE FOR BACTERIA: CPT | Performed by: PHYSICIAN ASSISTANT

## 2021-10-05 PROCEDURE — 25010000002 FUROSEMIDE PER 20 MG: Performed by: PHYSICIAN ASSISTANT

## 2021-10-05 PROCEDURE — 70498 CT ANGIOGRAPHY NECK: CPT | Performed by: RADIOLOGY

## 2021-10-05 PROCEDURE — 83880 ASSAY OF NATRIURETIC PEPTIDE: CPT | Performed by: PHYSICIAN ASSISTANT

## 2021-10-05 PROCEDURE — 87636 SARSCOV2 & INF A&B AMP PRB: CPT | Performed by: PHYSICIAN ASSISTANT

## 2021-10-05 PROCEDURE — 99284 EMERGENCY DEPT VISIT MOD MDM: CPT

## 2021-10-05 PROCEDURE — 85610 PROTHROMBIN TIME: CPT | Performed by: PHYSICIAN ASSISTANT

## 2021-10-05 PROCEDURE — 99223 1ST HOSP IP/OBS HIGH 75: CPT | Performed by: PHYSICIAN ASSISTANT

## 2021-10-05 PROCEDURE — 82805 BLOOD GASES W/O2 SATURATION: CPT

## 2021-10-05 PROCEDURE — 84484 ASSAY OF TROPONIN QUANT: CPT | Performed by: PHYSICIAN ASSISTANT

## 2021-10-05 PROCEDURE — G0378 HOSPITAL OBSERVATION PER HR: HCPCS

## 2021-10-05 PROCEDURE — 80162 ASSAY OF DIGOXIN TOTAL: CPT | Performed by: PHYSICIAN ASSISTANT

## 2021-10-05 PROCEDURE — 93010 ELECTROCARDIOGRAM REPORT: CPT | Performed by: INTERNAL MEDICINE

## 2021-10-05 PROCEDURE — 25010000002 MAGNESIUM SULFATE 2 GM/50ML SOLUTION: Performed by: PHYSICIAN ASSISTANT

## 2021-10-05 PROCEDURE — 93005 ELECTROCARDIOGRAM TRACING: CPT | Performed by: STUDENT IN AN ORGANIZED HEALTH CARE EDUCATION/TRAINING PROGRAM

## 2021-10-05 PROCEDURE — 99223 1ST HOSP IP/OBS HIGH 75: CPT | Performed by: PSYCHIATRY & NEUROLOGY

## 2021-10-05 PROCEDURE — 70496 CT ANGIOGRAPHY HEAD: CPT | Performed by: RADIOLOGY

## 2021-10-05 PROCEDURE — 71045 X-RAY EXAM CHEST 1 VIEW: CPT | Performed by: RADIOLOGY

## 2021-10-05 PROCEDURE — 36600 WITHDRAWAL OF ARTERIAL BLOOD: CPT

## 2021-10-05 PROCEDURE — 70498 CT ANGIOGRAPHY NECK: CPT

## 2021-10-05 PROCEDURE — 25010000002 CEFTRIAXONE PER 250 MG: Performed by: PHYSICIAN ASSISTANT

## 2021-10-05 PROCEDURE — 80053 COMPREHEN METABOLIC PANEL: CPT | Performed by: PHYSICIAN ASSISTANT

## 2021-10-05 PROCEDURE — A9577 INJ MULTIHANCE: HCPCS | Performed by: STUDENT IN AN ORGANIZED HEALTH CARE EDUCATION/TRAINING PROGRAM

## 2021-10-05 PROCEDURE — 83690 ASSAY OF LIPASE: CPT | Performed by: PHYSICIAN ASSISTANT

## 2021-10-05 PROCEDURE — 70496 CT ANGIOGRAPHY HEAD: CPT

## 2021-10-05 PROCEDURE — 82962 GLUCOSE BLOOD TEST: CPT

## 2021-10-05 PROCEDURE — 83050 HGB METHEMOGLOBIN QUAN: CPT

## 2021-10-05 PROCEDURE — 25010000002 MAGNESIUM SULFATE IN D5W 1G/100ML (PREMIX) 1-5 GM/100ML-% SOLUTION: Performed by: PHYSICIAN ASSISTANT

## 2021-10-05 PROCEDURE — 70553 MRI BRAIN STEM W/O & W/DYE: CPT

## 2021-10-05 PROCEDURE — 63710000001 INSULIN ASPART PER 5 UNITS: Performed by: INTERNAL MEDICINE

## 2021-10-05 PROCEDURE — 0 IOPAMIDOL PER 1 ML: Performed by: STUDENT IN AN ORGANIZED HEALTH CARE EDUCATION/TRAINING PROGRAM

## 2021-10-05 PROCEDURE — 85730 THROMBOPLASTIN TIME PARTIAL: CPT | Performed by: PHYSICIAN ASSISTANT

## 2021-10-05 PROCEDURE — 70450 CT HEAD/BRAIN W/O DYE: CPT

## 2021-10-05 PROCEDURE — 85025 COMPLETE CBC W/AUTO DIFF WBC: CPT | Performed by: PHYSICIAN ASSISTANT

## 2021-10-05 PROCEDURE — 70450 CT HEAD/BRAIN W/O DYE: CPT | Performed by: RADIOLOGY

## 2021-10-05 RX ORDER — ATORVASTATIN CALCIUM 40 MG/1
80 TABLET, FILM COATED ORAL NIGHTLY
Status: DISCONTINUED | OUTPATIENT
Start: 2021-10-05 | End: 2021-10-07 | Stop reason: HOSPADM

## 2021-10-05 RX ORDER — SODIUM CHLORIDE 0.9 % (FLUSH) 0.9 %
10 SYRINGE (ML) INJECTION AS NEEDED
Status: DISCONTINUED | OUTPATIENT
Start: 2021-10-05 | End: 2021-10-07 | Stop reason: HOSPADM

## 2021-10-05 RX ORDER — MAGNESIUM SULFATE HEPTAHYDRATE 40 MG/ML
2 INJECTION, SOLUTION INTRAVENOUS AS NEEDED
Status: DISCONTINUED | OUTPATIENT
Start: 2021-10-05 | End: 2021-10-07 | Stop reason: HOSPADM

## 2021-10-05 RX ORDER — ACETAMINOPHEN 325 MG/1
650 TABLET ORAL EVERY 6 HOURS PRN
Status: DISCONTINUED | OUTPATIENT
Start: 2021-10-05 | End: 2021-10-07 | Stop reason: HOSPADM

## 2021-10-05 RX ORDER — MAGNESIUM SULFATE HEPTAHYDRATE 40 MG/ML
2 INJECTION, SOLUTION INTRAVENOUS
Status: COMPLETED | OUTPATIENT
Start: 2021-10-05 | End: 2021-10-06

## 2021-10-05 RX ORDER — POTASSIUM CHLORIDE 7.45 MG/ML
10 INJECTION INTRAVENOUS
Status: DISCONTINUED | OUTPATIENT
Start: 2021-10-05 | End: 2021-10-07 | Stop reason: HOSPADM

## 2021-10-05 RX ORDER — SODIUM CHLORIDE 0.9 % (FLUSH) 0.9 %
10 SYRINGE (ML) INJECTION EVERY 12 HOURS SCHEDULED
Status: DISCONTINUED | OUTPATIENT
Start: 2021-10-05 | End: 2021-10-07 | Stop reason: HOSPADM

## 2021-10-05 RX ORDER — NICOTINE POLACRILEX 4 MG
15 LOZENGE BUCCAL
Status: DISCONTINUED | OUTPATIENT
Start: 2021-10-05 | End: 2021-10-07 | Stop reason: HOSPADM

## 2021-10-05 RX ORDER — PRAVASTATIN SODIUM 10 MG
10 TABLET ORAL DAILY
COMMUNITY
End: 2021-10-07 | Stop reason: HOSPADM

## 2021-10-05 RX ORDER — ASPIRIN 300 MG/1
300 SUPPOSITORY RECTAL DAILY
Status: DISCONTINUED | OUTPATIENT
Start: 2021-10-05 | End: 2021-10-06

## 2021-10-05 RX ORDER — ATORVASTATIN CALCIUM 10 MG/1
10 TABLET, FILM COATED ORAL ONCE
Status: COMPLETED | OUTPATIENT
Start: 2021-10-05 | End: 2021-10-05

## 2021-10-05 RX ORDER — DEXTROSE MONOHYDRATE 25 G/50ML
25 INJECTION, SOLUTION INTRAVENOUS
Status: DISCONTINUED | OUTPATIENT
Start: 2021-10-05 | End: 2021-10-07 | Stop reason: HOSPADM

## 2021-10-05 RX ORDER — GLIPIZIDE 10 MG/1
10 TABLET ORAL
COMMUNITY

## 2021-10-05 RX ORDER — CARVEDILOL 6.25 MG/1
6.25 TABLET ORAL 2 TIMES DAILY WITH MEALS
COMMUNITY
End: 2021-10-29 | Stop reason: SDUPTHER

## 2021-10-05 RX ORDER — ACETAMINOPHEN 500 MG
1000 TABLET ORAL ONCE
Status: COMPLETED | OUTPATIENT
Start: 2021-10-05 | End: 2021-10-05

## 2021-10-05 RX ORDER — PRAVASTATIN SODIUM 10 MG
10 TABLET ORAL DAILY
Status: CANCELLED | OUTPATIENT
Start: 2021-10-06

## 2021-10-05 RX ORDER — MAGNESIUM SULFATE 1 G/100ML
1 INJECTION INTRAVENOUS ONCE
Status: COMPLETED | OUTPATIENT
Start: 2021-10-05 | End: 2021-10-05

## 2021-10-05 RX ORDER — POTASSIUM CHLORIDE 1.5 G/1.77G
40 POWDER, FOR SOLUTION ORAL AS NEEDED
Status: DISCONTINUED | OUTPATIENT
Start: 2021-10-05 | End: 2021-10-07 | Stop reason: HOSPADM

## 2021-10-05 RX ORDER — ATORVASTATIN CALCIUM 10 MG/1
10 TABLET, FILM COATED ORAL NIGHTLY
Status: DISCONTINUED | OUTPATIENT
Start: 2021-10-05 | End: 2021-10-05

## 2021-10-05 RX ORDER — MAGNESIUM SULFATE HEPTAHYDRATE 40 MG/ML
4 INJECTION, SOLUTION INTRAVENOUS AS NEEDED
Status: DISCONTINUED | OUTPATIENT
Start: 2021-10-05 | End: 2021-10-07 | Stop reason: HOSPADM

## 2021-10-05 RX ORDER — ASPIRIN 81 MG/1
324 TABLET, CHEWABLE ORAL ONCE
Status: COMPLETED | OUTPATIENT
Start: 2021-10-05 | End: 2021-10-05

## 2021-10-05 RX ORDER — GUAIFENESIN 600 MG/1
1200 TABLET, EXTENDED RELEASE ORAL 2 TIMES DAILY
Status: ON HOLD | COMMUNITY
End: 2022-01-14

## 2021-10-05 RX ORDER — ASPIRIN 325 MG
325 TABLET ORAL DAILY
Status: DISCONTINUED | OUTPATIENT
Start: 2021-10-05 | End: 2021-10-06

## 2021-10-05 RX ORDER — POTASSIUM CHLORIDE 750 MG/1
40 TABLET, FILM COATED, EXTENDED RELEASE ORAL AS NEEDED
Status: DISCONTINUED | OUTPATIENT
Start: 2021-10-05 | End: 2021-10-07 | Stop reason: HOSPADM

## 2021-10-05 RX ORDER — GLIPIZIDE 5 MG/1
10 TABLET ORAL
Status: CANCELLED | OUTPATIENT
Start: 2021-10-06

## 2021-10-05 RX ORDER — FUROSEMIDE 10 MG/ML
40 INJECTION INTRAMUSCULAR; INTRAVENOUS ONCE
Status: COMPLETED | OUTPATIENT
Start: 2021-10-05 | End: 2021-10-05

## 2021-10-05 RX ADMIN — DOXYCYCLINE 100 MG: 100 INJECTION, POWDER, LYOPHILIZED, FOR SOLUTION INTRAVENOUS at 22:40

## 2021-10-05 RX ADMIN — MAGNESIUM SULFATE HEPTAHYDRATE 1 G: 1 INJECTION, SOLUTION INTRAVENOUS at 13:23

## 2021-10-05 RX ADMIN — INSULIN ASPART 5 UNITS: 100 INJECTION, SOLUTION INTRAVENOUS; SUBCUTANEOUS at 22:05

## 2021-10-05 RX ADMIN — ACETAMINOPHEN 1000 MG: 500 TABLET ORAL at 13:33

## 2021-10-05 RX ADMIN — ATORVASTATIN CALCIUM 10 MG: 10 TABLET, FILM COATED ORAL at 17:50

## 2021-10-05 RX ADMIN — ATORVASTATIN CALCIUM 80 MG: 40 TABLET, FILM COATED ORAL at 22:04

## 2021-10-05 RX ADMIN — FUROSEMIDE 40 MG: 10 INJECTION INTRAMUSCULAR; INTRAVENOUS at 13:20

## 2021-10-05 RX ADMIN — IOPAMIDOL 70 ML: 755 INJECTION, SOLUTION INTRAVENOUS at 16:53

## 2021-10-05 RX ADMIN — MAGNESIUM SULFATE HEPTAHYDRATE 2 G: 40 INJECTION, SOLUTION INTRAVENOUS at 22:05

## 2021-10-05 RX ADMIN — CEFTRIAXONE 1 G: 1 INJECTION, POWDER, FOR SOLUTION INTRAMUSCULAR; INTRAVENOUS at 22:40

## 2021-10-05 RX ADMIN — GADOBENATE DIMEGLUMINE 20 ML: 529 INJECTION, SOLUTION INTRAVENOUS at 15:20

## 2021-10-05 RX ADMIN — ASPIRIN 324 MG: 81 TABLET, CHEWABLE ORAL at 16:26

## 2021-10-05 RX ADMIN — ASPIRIN 325 MG: 325 TABLET ORAL at 22:04

## 2021-10-05 NOTE — CONSULTS
Stroke Consult Note    Patient Name: Cas Mccabe   MRN: [id number]  Age: 56 y.o.  Sex: male  : 1965    Primary Care Physician: Monique Gambino APRN  Referring Physician:  No ref. provider found        Handedness: Right  Race: White    Chief Complaint/Reason for Consultation: Stroke noted on the scans    Subjective .  HPI: 56-year-old right-handed white male with known diagnosis of nonischemic congestive heart failure, with EF of 20% about 5 years ago, now not following any cardiologist, hypertension, diabetes, hyperlipidemia, who came in with few days of short of breath when laying down.  Patient had similar symptoms when he was diagnosed with congestive heart failure 5 years ago.  Patient denies having any focal weakness/numbness/visual changes/slurred speech.  Patient did have headache which was bifrontal, 8/10 in intensity, mild nausea, no photosensitivity, no history of migraine headaches.  Patient has not been able to sleep well secondary to shortness of breath when he lays down.    Last Known Normal Date/Time: Not available    Review of Systems   Constitutional: Negative.    Eyes: Positive for visual disturbance (Blurry vision).   Respiratory: Positive for shortness of breath.    Cardiovascular: Negative.    Gastrointestinal: Negative.    Endocrine: Negative.    Genitourinary: Negative.    Musculoskeletal: Negative.    Skin: Negative.  Negative for color change.   Neurological: Positive for headaches.   Psychiatric/Behavioral: Negative.       Past Medical History:   Diagnosis Date   • Diabetes mellitus (HCC)    • Hyperlipidemia    • Hypertension      Past Surgical History:   Procedure Laterality Date   • CARDIAC CATHETERIZATION      no stents     History reviewed. No pertinent family history.  Social History     Socioeconomic History   • Marital status:      Spouse name: Not on file   • Number of children: Not on file   • Years of education: Not on file   • Highest education level: Not on  file   Tobacco Use   • Smoking status: Never Smoker   • Smokeless tobacco: Never Used   Substance and Sexual Activity   • Alcohol use: No   • Drug use: No   • Sexual activity: Defer     No Known Allergies  Prior to Admission medications    Medication Sig Start Date End Date Taking? Authorizing Provider   carvedilol (COREG) 12.5 MG tablet Take 6.25 mg by mouth 2 (Two) Times a Day With Meals.    Jonelle Hernandez MD   digoxin (LANOXIN) 125 MCG tablet Take 125 mcg by mouth Daily.    Jonelle Hernandez MD   furosemide (LASIX) 20 MG tablet Take 20 mg by mouth Daily.    Jonelle Hernandez MD   glyBURIDE (DIAbeta) 5 MG tablet Take 5 mg by mouth 2 (Two) Times a Day.    Jonelle Hernandez MD   losartan (COZAAR) 50 MG tablet Take 50 mg by mouth Daily.    Jonelle Hernandez MD   metFORMIN (GLUCOPHAGE) 1000 MG tablet Take 1,000 mg by mouth 2 (Two) Times a Day With Meals.    Jonelle Hernandez MD   nitroglycerin (NITROSTAT) 0.4 MG SL tablet Place 1 tablet under the tongue Every 5 (Five) Minutes As Needed for Chest Pain (Chest Pain With Systolic Blood Pressure Greater Than 100). 12/20/18   Dennise Tony APRN   pravastatin (PRAVACHOL) 20 MG tablet Take 20 mg by mouth Every Night.    Jonelle Hernandez MD   spironolactone (ALDACTONE) 25 MG tablet Take 25 mg by mouth Daily.    Jonelle Hernandez MD             Objective     Temp:  [98 °F (36.7 °C)-98.2 °F (36.8 °C)] 98 °F (36.7 °C)  Heart Rate:  [] 92  Resp:  [15-20] 15  BP: (113-167)/() 113/82  Neurological Exam  Mental Status  Awake, alert and oriented to person, place and time.Alert. Speech is normal. Language is fluent with no aphasia. Attention and concentration are normal. Fund of knowledge is appropriate for level of education.    Cranial Nerves  CN II: Visual fields full to confrontation.  CN III, IV, VI: Extraocular movements intact bilaterally. Normal lids and orbits bilaterally. Pupils equal round and reactive to light  bilaterally.  CN V: Facial sensation is normal.  CN VII: Full and symmetric facial movement.  CN VIII: Equal hearing bilaterally.  CN IX, X: Palate elevates symmetrically  CN XI: Shoulder shrug strength is normal.  CN XII: Tongue midline without atrophy or fasciculations.    Motor  Normal muscle bulk throughout. No fasciculations present.  No focal weakness in any extremities.    Sensory  Light touch is normal in upper and lower extremities.     Reflexes  Not assessed.    Coordination  No dysmetria.    Gait  Not assessed.      Physical Exam  Vitals and nursing note reviewed.   Constitutional:       Appearance: Normal appearance.   HENT:      Head: Normocephalic and atraumatic.      Mouth/Throat:      Mouth: Mucous membranes are moist.      Pharynx: Oropharynx is clear.   Eyes:      General: Lids are normal.      Extraocular Movements: Extraocular movements intact.      Pupils: Pupils are equal, round, and reactive to light.   Cardiovascular:      Rate and Rhythm: Normal rate and regular rhythm.   Pulmonary:      Effort: Pulmonary effort is normal. No respiratory distress.   Musculoskeletal:      Cervical back: Normal range of motion and neck supple.   Neurological:      Mental Status: He is alert.   Psychiatric:         Mood and Affect: Mood normal.         Speech: Speech normal.         Behavior: Behavior normal.         Acute Stroke Data    Alteplase (tPA) Inclusion / Exclusion Criteria    Time: 17:43 EDT  Person Administering Scale: Boo Hannon MD    Inclusion Criteria  [x]   18 years of age or greater   []   Onset of symptoms < 4.5 hours before beginning treatment (stroke onset = time patient was last seen well or without symptoms).   []   Diagnosis of acute ischemic stroke causing measurable disabling deficit (Complete Hemianopia, Any Aphasia, Visual or Sensory Extinction, Any weakness limiting sustained effort against gravity)   []   Any remaining deficit considered potentially disabling in view of patient  and practitioner   Exclusion criteria (Do not proceed with Alteplase if any are checked under exclusion criteria)  [x]   Onset unknown or GREATER than 4.5 hours   []   ICH on CT/MRI   [x]   CT demonstrates hypodensity representing acute or subacute infarct   []   Significant head trauma or prior stroke in the previous 3 months   []   Symptoms suggestive of subarachnoid hemorrhage   []   History of un-ruptured intracranial aneurysm GREATER than 10 mm   []   Recent intracranial or intraspinal surgery within the last 3 months   []   Arterial puncture at a non-compressible site in the previous 7 days   []   Active internal bleeding   []   Acute bleeding tendency   []   Platelet count LESS than 100,000 for known hematological diseases such as leukemia, thrombocytopenia or chronic cirrhosis   []   Current use of anticoagulant with INR GREATER than 1.7 or PT GREATER than 15 seconds, aPTT GREATER than 40 seconds   []   Heparin received within 48 hours, resulting in abnormally elevated aPTT GREATER than upper limit of normal   []   Current use of direct thrombin inhibitors or direct factor Xa inhibitors in the past 48 hours   []   Elevated blood pressure refractory to treatment (systolic GREATER than 185 mm/Hg or diastolic  GREATER than 110 mm/Hg   []   Suspected infective endocarditis and aortic arch dissection   []   Current use of therapeutic treatment dose of low-molecular-weight heparin (LMWH) within the previous 24 hours   []   Structural GI malignancy or bleed   Relative exclusion for all patients  []   Only minor non-disabling symptoms   []   Pregnancy   []   Seizure at onset with postictal residual neurological impairments   []   Major surgery or previous trauma within past 14 days   []   History of previous spontaneous ICH, intracranial neoplasm, or AV malformation   []   Postpartum (within previous 14 days)   []   Recent GI or urinary tract hemorrhage (within previous 21 days)   []   Recent acute MI (within  previous 3 months)   []   History of un-ruptured intracranial aneurysm LESS than 10 mm   []   History of ruptured intracranial aneurysm   []   Blood glucose LESS than 50 mg/dL (2.7 mmol/L)   []   Dural puncture within the last 7 days   []   Known GREATER than 10 cerebral microbleeds   Additional exclusions for patients with symptoms onset between 3 and 4.5 hours.  []   Age > 80.   []   On any anticoagulants regardless of INR  >>> Warfarin (Coumadin), Heparin, Enoxaparin (Lovenox), fondaparinux (Arixtra), bivalirudin (Angiomax), Argatroban, dabigatran (Pradaxa), rivaroxaban (Xarelto), or apixaban (Eliquis)   []   Severe stroke (NIHSS > 25).   []   History of BOTH diabetes and previous ischemic stroke.   []   The risks and benefits have been discussed with the patient or family related to the administration of IV Alteplase for stroke symptoms.   []   I have discussed and reviewed the patient's case and imaging with the attending prior to IV Alteplase.    Time Alteplase administered       Hospital Meds:  Scheduled- atorvastatin, 10 mg, Oral, Once      Infusions-     PRNs- [COMPLETED] Insert peripheral IV **AND** sodium chloride    Functional Status Prior to Current Stroke/[place] Score: 0    NIH Stroke Scale  Time: 17:43 EDT  Person Administering Scale: Boo Hannon MD    1a  Level of consciousness: 0=alert; keenly responsive   1b. LOC questions:  0=Performs both tasks correctly   1c. LOC commands: 0=Performs both tasks correctly   2.  Best Gaze: 0=normal   3.  Visual: 0=No visual loss   4. Facial Palsy: 0=Normal symmetric movement   5a.  Motor left arm: 0=No drift, limb holds 90 (or 45) degrees for full 10 seconds   5b.  Motor right arm: 0=No drift, limb holds 90 (or 45) degrees for full 10 seconds   6a. motor left le=No drift, limb holds 90 (or 45) degrees for full 10 seconds   6b  Motor right le=No drift, limb holds 90 (or 45) degrees for full 10 seconds   7. Limb Ataxia: 0=Absent   8.  Sensory: 0=Normal;  no sensory loss   9. Best Language:  0=No aphasia, normal   10. Dysarthria: 0=Normal   11. Extinction and Inattention: 0=No abnormality    Total:   0       Results Reviewed:  I have personally reviewed current lab, radiology, and data   CT head shows right occipital region stroke, no hemorrhage.  CT angiogram of head and neck shows no significant stenosis/occlusion  MRI brain shows patchy right PCA region stroke, and a chronic small left embolic stroke, no hemorrhage.  Reviewed his labs.      Results for orders placed during the hospital encounter of 12/19/18    Adult Transthoracic Echo Complete W/ Cont if Necessary Per Protocol    Interpretation Summary  · The left ventricular cavity is moderate-to-severely dilated.  · Left ventricular diastolic dysfunction (grade II) consistent with pseudonormalization.  · Calculated EF = 30%. Estimated EF was in agreement with the calculated EF. Normal left ventricular wall thickness noted.            Assessment/Plan:      1. Right posterior cerebral artery stroke.  Given patient's history, this is likely cardioembolic in etiology.  Will recommend to give him aspirin today along with statins.  Recommend to get 2D echocardiogram in the morning, and if ejection fraction less than 35%, recommend to start him on anticoagulation.  If EF is normal, we can continue him on aspirin and statins, and will get prolonged cardiac monitoring to look for cardiac arrhythmias.  2. Congestive heart failure, systolic and diastolic, chronic.  Will follow-up on 2D echocardiogram, and see if his ejection fraction has worsened again.  Consider cardiology consult.  3. Essential hypertension.  Relatively well controlled.  Okay to continue normal blood pressure goals.  No need for permissive hypertension.  4. Diabetes mellitus type 2 well controlled with hyperglycemia.  Without long-term use of insulin.  Continue his home medication.  Maintain normoglycemia.  5. Dyslipidemia.  Will check lipid panel.   Recommend to start full dose of statins.  6. Out of bed with nursing.  Healthy heart/diabetic diet.    Case was discussed with patient, nursing and the ER team.  Thank you for the consult.          Boo Hannon MD  October 5, 2021  17:43 EDT    Verbal consent taken.  Patient agreeable to be seen via telemedicine.    This was an audio and video enabled telemedicine encounter.

## 2021-10-05 NOTE — H&P
[facility] Medicine Services  History & Physical    Patient Identification:  Name:  Cas Mccabe  Age:  56 y.o.  Sex:  male  :  1965  MRN:  [id number]   Visit Number:  [id number]  Admit Date: 10/5/2021   Primary Care Physician:  Monique Gambino APRN    Subjective     Chief complaint: Chest pain, headache, dyspnea    History of presenting illness:      Cas Mccabe is a 56 y.o. male with past medical history significant for diabetes mellitus type 2, hyperlipidemia, systolic CHF.   He presented to the ED of [facility] with chief complaint and for further evaluation of chest pain.      Mr. Mccabe initially reported chest pain and headache in addition to shortness of breath. He reported a low EF of 30% a few years ago but reported that at some point he had a repeat echocardiogram elsewhere with EF improved. He has diabetes for which he takes Metformin and glipizide at home and is on Coreg and losartan per his PCP.    Upon arrival to the ED, vital signs were T 98.2F, pulse 114, RR 18, and /100. Imaging studies showed concern for acute right occipital stroke with full reports as outlined within this document.     Upon discussion Mr. Mccabe reports headache for at least 1 weeks duration.  He reports he does not normally get headaches.  He reports he would find that when he went to bed the throbbing sensation was worst in the back of his head.  He reports this morning his headache was significant and he also had worsening dyspnea.  He reports that he has had worsening shortness of breath when supine for at least the past week.  He initially attributed his headaches to this.  He reports he has had a cough productive of yellow sputum.  He reports that the cough is sometimes dry.  He is vaccinated with Pfizer vaccines for Covid.    In regard to neurological deficits, he reports earlier this morning he did have some difficulties with his vision that since resolved.  He did drive himself to  the hospital and did notice along the way that he did not think he could see that well however this has since resolved.  He denies difficulties in his speech.  He reports he has had weakness in his bilateral lower extremities for the past week but also attributed this to his shortness of breath and intermittent chest pains.  He did also report upon asking that he has been having events of palpitations during which time he feels his heart speeds up in his chest.    He denies nausea vomiting and diarrhea.  He denies fever and chills.  He reports he has been taking Mucinex at home for his cough and this has helped with productivity.  He reports when he came to the emergency department he was initially concerned he had pneumonia.  We discussed obviously his imaging studies today with stroke but do also gone to discuss the question of pneumonia in the upper lung lobes on visualization during CT neck.      Mr. Mccabe lives at home with his wife, daughter, and grandchild.  He does not have focal deficits to my evaluation in room 307A.   Thanked patient for vaccinating and helping our community.       ---------------------------------------------------------------------------------------------------------------------   Review of Systems   Constitutional: Positive for fatigue.   HENT: Negative for congestion and drooling.    Eyes: Positive for visual disturbance. Negative for pain and discharge.        Visual disturbance with blurriness that has since resolved   Respiratory: Positive for cough and shortness of breath.    Cardiovascular: Positive for palpitations. Negative for chest pain and leg swelling.   Gastrointestinal: Negative for abdominal distention and abdominal pain.   Endocrine: Negative for cold intolerance and heat intolerance.   Genitourinary: Negative for difficulty urinating and dysuria.   Musculoskeletal: Negative for arthralgias and back pain.   Allergic/Immunologic: Negative for environmental allergies and  food allergies.   Neurological: Negative for dizziness and headaches.   Hematological: Negative for adenopathy. Does not bruise/bleed easily.   Psychiatric/Behavioral: Negative for agitation and confusion.        ---------------------------------------------------------------------------------------------------------------------       Received Pfizer COVID-19 immunizations on March 15, 2021 in April 5, 2021  Past Medical History:   Diagnosis Date   • Combined systolic and diastolic congestive heart failure (HCC)    • Diabetes mellitus (HCC)    • Hyperlipidemia    • Hypertension      Past Surgical History:   Procedure Laterality Date   • CARDIAC CATHETERIZATION      no stents     Family History   Problem Relation Age of Onset   • Diabetes Mother    • Diabetes Father      Social History     Socioeconomic History   • Marital status:      Spouse name: Not on file   • Number of children: Not on file   • Years of education: Not on file   • Highest education level: Not on file   Tobacco Use   • Smoking status: Never Smoker   • Smokeless tobacco: Never Used   Substance and Sexual Activity   • Alcohol use: No   • Drug use: No   • Sexual activity: Defer     ---------------------------------------------------------------------------------------------------------------------   Allergies:  Patient has no known allergies.  ---------------------------------------------------------------------------------------------------------------------   Home medications:    Medications below are reported home medications pulling from within the system; at this time, these medications have not been reconciled unless otherwise specified and are in the verification process for further verifcation as current home medications.  Medications Prior to Admission   Medication Sig Dispense Refill Last Dose   • carvedilol (COREG) 6.25 MG tablet Take 6.25 mg by mouth 2 (Two) Times a Day With Meals.   10/5/2021 at am   • glipizide (GLUCOTROL) 10 MG  tablet Take 10 mg by mouth 2 (Two) Times a Day Before Meals.   10/5/2021 at am   • losartan (COZAAR) 50 MG tablet Take 50 mg by mouth Daily.   10/5/2021 at am   • metFORMIN (GLUCOPHAGE) 1000 MG tablet Take 1,000 mg by mouth 2 (Two) Times a Day With Meals.   10/5/2021 at am   • pravastatin (PRAVACHOL) 10 MG tablet Take 10 mg by mouth Daily.   10/5/2021 at am       Hospital Scheduled Meds:  aspirin, 325 mg, Oral, Daily   Or  aspirin, 300 mg, Rectal, Daily  atorvastatin, 80 mg, Oral, Nightly  cefTRIAXone, 1 g, Intravenous, Q24H  doxycycline, 100 mg, Intravenous, Q12H  insulin aspart, 0-7 Units, Subcutaneous, 4x Daily AC & at Bedtime  magnesium sulfate, 2 g, Intravenous, Q2H  sodium chloride, 10 mL, Intravenous, Q12H           Current listed hospital scheduled medications may not yet reflect those currently placed in orders that are signed and held awaiting patient's arrival to floor.   ---------------------------------------------------------------------------------------------------------------------     Objective     Vital Signs:  Temp:  [98 °F (36.7 °C)-98.2 °F (36.8 °C)] 98 °F (36.7 °C)  Heart Rate:  [] 90  Resp:  [15-20] 18  BP: (113-167)/() 130/70      10/05/21  0859 10/05/21  1900   Weight: 112 kg (246 lb) 110 kg (241 lb 12.8 oz)     Body mass index is 35.71 kg/m².  ---------------------------------------------------------------------------------------------------------------------       Physical Exam  Vitals and nursing note reviewed.   Constitutional:       General: He is awake.      Appearance: Normal appearance. He is well-developed. He is obese.   HENT:      Head: Normocephalic and atraumatic.   Eyes:      General: Lids are normal.      Conjunctiva/sclera:      Right eye: Right conjunctiva is not injected.      Left eye: Left conjunctiva is not injected.   Cardiovascular:      Rate and Rhythm: Normal rate and regular rhythm.      Heart sounds: S1 normal and S2 normal.   Pulmonary:      Breath  sounds: Examination of the right-lower field reveals decreased breath sounds and wheezing. Examination of the left-lower field reveals decreased breath sounds and wheezing. Decreased breath sounds and wheezing present. No rhonchi or rales.   Abdominal:      General: Bowel sounds are normal.      Palpations: Abdomen is soft.      Tenderness: There is no abdominal tenderness.   Musculoskeletal:      Right lower leg: No swelling. No edema.      Left lower leg: No swelling. No edema.   Skin:     General: Skin is warm and moist.   Neurological:      Mental Status: He is alert and oriented to person, place, and time.      Cranial Nerves: No dysarthria or facial asymmetry.      Sensory: No sensory deficit.      Motor: Weakness present.      Comments: Bilateral lower extremity weakness   Psychiatric:         Attention and Perception: Attention normal.         Mood and Affect: Mood normal.         Behavior: Behavior is cooperative.             ---------------------------------------------------------------------------------------------------------------------  EKG:                      ---------------------------------------------------------------------------------------------------------------------   Results from last 7 days   Lab Units 10/05/21  1104   WBC 10*3/mm3 11.56*   HEMOGLOBIN g/dL 13.7   HEMATOCRIT % 41.6   MCV fL 87.2   MCHC g/dL 32.9   PLATELETS 10*3/mm3 332   INR  0.90     Results from last 7 days   Lab Units 10/05/21  1132   PH, ARTERIAL pH units 7.425   PO2 ART mm Hg 69.1*   PCO2, ARTERIAL mm Hg 37.8   HCO3 ART mmol/L 24.8     Results from last 7 days   Lab Units 10/05/21  1104   SODIUM mmol/L 129*   POTASSIUM mmol/L 4.7   MAGNESIUM mg/dL 1.5*   CHLORIDE mmol/L 95*   CO2 mmol/L 25.2   BUN mg/dL 23*   CREATININE mg/dL 0.79   EGFR IF NONAFRICN AM mL/min/1.73 102   CALCIUM mg/dL 9.6   GLUCOSE mg/dL 315*   ALBUMIN g/dL 3.70   BILIRUBIN mg/dL 0.8   ALK PHOS U/L 65   AST (SGOT) U/L 12   ALT (SGPT) U/L 17    Estimated Creatinine Clearance: 130.6 mL/min (by C-G formula based on SCr of 0.79 mg/dL).  No results found for: AMMONIA  Results from last 7 days   Lab Units 10/05/21  1304 10/05/21  1104   TROPONIN T ng/mL <0.010 <0.010     Results from last 7 days   Lab Units 10/05/21  1104   PROBNP pg/mL 1,542.0*     Lab Results   Component Value Date    HGBA1C 10.00 (H) 12/20/2018     Lab Results   Component Value Date    TSH 0.811 12/20/2018     No results found for: PREGTESTUR, PREGSERUM, HCG, HCGQUANT  Pain Management Panel    There is no flowsheet data to display.       No results found for: BLOODCX  No results found for: URINECX  No results found for: WOUNDCX  No results found for: STOOLCX      ---------------------------------------------------------------------------------------------------------------------  Imaging Results (Last 7 Days)     Procedure Component Value Units Date/Time    CT Angiogram Neck [[id number]] Collected: 10/05/21 1702     Updated: 10/05/21 1707    Narrative:      EXAM:    CT Angiography Neck With Intravenous Contrast     EXAM DATE:    10/5/2021 4:28 PM     CLINICAL HISTORY:    stroke     TECHNIQUE:    Axial computed tomographic angiography images of the neck with  intravenous contrast. LVO analysis was performed with [employer] software.   This CT exam was performed using one or more of the following dose  reduction techniques:  automated exposure control, adjustment of the mA  and/or kV according to patient size, and/or use of iterative  reconstruction technique.    MIP reconstructed images were created and reviewed.     COMPARISON:    No relevant prior studies available.     FINDINGS:      VASCULATURE:    Right common carotid artery:  Mild interval thickening of right CCA.   No significant stenosis.  No dissection or occlusion.    Right internal carotid artery:  Mild calcified and noncalcified plaque  of the proximal right ICA and right carotid bulb. No significant  stenosis or occlusion.    Right  external carotid artery:  Unremarkable.  No occlusion.    Right vertebral artery:  Unremarkable.  No significant stenosis.  No  dissection or occlusion.       Left common carotid artery:  Mild intimal thickening left CCA.  No  significant stenosis.  No dissection or occlusion.    Left internal carotid artery:  Mild calcified plaque left proximal ICA  and left carotid bulb. No significant stenosis or occlusion.    Left external carotid artery:  Unremarkable.  No occlusion.    Left vertebral artery:  Left vertebral artery dominance is noted.  No  significant stenosis.  No dissection or occlusion.    Other vasculature:  Three-vessel arch configuration is noted.      NECK:    Bones/joints:  Multilevel degenerative changes cervical spine with  mild neural foraminal stenosis lower levels.  No acute fracture.  No  dislocation.    Soft tissues:  No enhancing soft tissue masses.    Lymph nodes:  Reactive appearing cervical lymph nodes. No adenopathy.    Lung apices:  Nodular opacities of the right upper lobe most  consistent with pneumonia. Largest opacity in the right lung apex is  21.8 mm and CT follow-up is recommended after treatment. These appear to  be in a centrilobular distribution. Faint nodular opacities of the left  upper lobe are also present.      CAROTID STENOSIS REFERENCE USING NASCET CRITERIA:    % ICA stenosis = (1 - narrowest ICA diameter/diameter of distal  cervical ICA) x 100.    Mild - <50% stenosis.    Moderate - 50-69% stenosis.    Severe - 70-94% stenosis.    Near occlusion - 95-99% stenosis.    Occluded - 100% stenosis.       Impression:      1.  Mild plaque of the right greater than left carotid systems. No  occlusion or hemodynamically significant stenosis.  2.  Vertebrobasilar circulation appears within normal limits.  3.  Patchy opacities and centrilobular nodules of the right greater than  left upper lobes in a pattern and distribution most consistent with  pneumonia. Correlate with clinical  history.  4.  Other nonacute findings as above.     This report was finalized on 10/5/2021 5:05 PM by Dr. Taj Solares MD.       CT Angiogram Head [[id number]] Collected: 10/05/21 1700     Updated: 10/05/21 1705    Narrative:      EXAM:    CT Angiography Head With Intravenous Contrast     EXAM DATE:    10/5/2021 4:27 PM     CLINICAL HISTORY:    stroke     TECHNIQUE:    Axial computed tomographic angiography images of the head with  intravenous contrast. LVO analysis was performed with [email or web address] software.   This CT exam was performed using one or more of the following dose  reduction techniques:  automated exposure control, adjustment of the mA  and/or kV according to patient size, and/or use of iterative  reconstruction technique.    MIP reconstructed images were created and reviewed.     COMPARISON:    No relevant prior studies available.     FINDINGS:    Right internal carotid artery:  Mild calcifications of the right ICA  intracranial segments. No significant stenosis or occlusion.  No  aneurysm.    Right anterior cerebral artery:  See below.      Right middle cerebral artery:  Unremarkable.  No occlusion or  significant stenosis.  No aneurysm.    Right posterior cerebral artery:  Unremarkable.  No occlusion or  significant stenosis.  No aneurysm.    Right vertebral artery:  Unremarkable as visualized.       Left internal carotid artery:  Mild calcifications of the left  intracranial ICA segments. No significant stenosis or occlusion.  No  aneurysm.    Left anterior cerebral artery:  Normal variant anatomy of the left LISA  vascular system with diminutive left A2 LISA segments which appears to be  predominantly supplied by the right A2 LISA segments.  No occlusion or  significant stenosis.  No aneurysm.    Left middle cerebral artery:  Unremarkable.  No occlusion or  significant stenosis.  No aneurysm.    Left posterior cerebral artery:  Unremarkable.  No occlusion or  significant stenosis.  No aneurysm.    Left  vertebral artery:  Mild left vertebral artery dominance is noted.       Basilar artery:  The basilar artery is unremarkable.  No occlusion or  significant stenosis.  No aneurysm.    Other findings:  Basilar apex is unremarkable.       Impression:      1.  Unremarkable CTA of the brain demonstrating no large vessel  occlusion. Normal congenital anatomic variants are incidentally noted.  2.  Hypodensity right occipital lobe region compatible with patient's  known infarct.     This report was finalized on 10/5/2021 5:02 PM by Dr. Taj Solares MD.       MRI Brain With & Without Contrast [[id number]] Collected: 10/05/21 1535     Updated: 10/05/21 1539    Narrative:      EXAM:    MR Head Without and With Intravenous Contrast     EXAM DATE:    10/5/2021 2:47 PM     CLINICAL HISTORY:    HA, abnormal CT     TECHNIQUE:    Magnetic resonance images of the head/brain without and with  intravenous contrast in multiple planes.     COMPARISON:    CT same day     FINDINGS:    Brain:  Corresponding to CT abnormality in the right occipital lobe,  restricted diffusion is noted with correspondingly low ADC signal  compatible with acute ischemic infarct. No MRI evidence of hemorrhage.   Chronic small vessel ischemic disease is noted.  Chronic appearing  subcortical infarct left frontal lobe.  Normal age appropriate brain  volume is noted.    Midline shift:  No midline shift.    Ventricles:  No hydrocephalus.    Bones/joints:  Unremarkable.    Sinuses:  Unremarkable as visualized.  No acute sinusitis.    Mastoid air cells:  Unremarkable as visualized.  No mastoid effusion.    Orbits:  Unremarkable as visualized.       Impression:        Right occipital region acute infarct.     This report was finalized on 10/5/2021 3:37 PM by Dr. Taj Solares MD.       CT Head Without Contrast [[id number]] Collected: 10/05/21 1419     Updated: 10/05/21 1422    Narrative:      CT HEAD WO CONTRAST-     CLINICAL INDICATION: severe HA        COMPARISON:  None available      TECHNIQUE: Axial images of the brain were obtained with out intravenous  contrast.  Reformatted images were created in the sagittal and coronal  planes.     DOSE:     Radiation dose reduction techniques were utilized per ALARA protocol.  Automated exposure control was initiated through either or [employer] or  [employer] software packages by  protocol.           FINDINGS:   Vague low-attenuation in the right occipital region. This could  represent an area of ischemia. Age-indeterminate. Recommend clinical  correlation. MRI may be necessary  The ventricles, cisterns, and sulci are unremarkable. There is no  hydrocephalus.      I do not see epidural or subdural hematoma.     The bone window setting images show no destructive calvarial lesion or  acute calvarial fracture.   The posterior fossa is unremarkable.          Impression:         1. Vague area of decreased attenuation in the right occipital region  could represent focus of ischemia. Recommend clinical correlation. If  suspicion is high, then consider MRI  2. No focal parenchymal hemorrhage or midline shift     This report was finalized on 10/5/2021 2:20 PM by Dr. Raji Burnham MD.       XR Chest 1 View [[id number]] Collected: 10/05/21 1246     Updated: 10/05/21 1300    Narrative:      EXAM:    XR Chest, 1 View     EXAM DATE:    10/5/2021 12:22 PM     CLINICAL HISTORY:    chest pain     TECHNIQUE:    Frontal view of the chest.     COMPARISON:    12/19/2018     FINDINGS:    Lungs:  Unremarkable.  No consolidation.    Pleural space:  Unremarkable.  No pneumothorax.    Heart:  Unremarkable.  No cardiomegaly.    Mediastinum:  Unremarkable.    Bones/joints:  Unremarkable.       Impression:        Stable unremarkable chest.     This report was finalized on 10/5/2021 12:46 PM by Dr. aTj Solares MD.             Cultures:  No results found for: BLOODCX, URINECX, WOUNDCX, MRSACX, RESPCX, STOOLCX    Last echocardiogram:  Results for orders  placed during the hospital encounter of 12/19/18    Adult Transthoracic Echo Complete W/ Cont if Necessary Per Protocol    Interpretation Summary  · The left ventricular cavity is moderate-to-severely dilated.  · Left ventricular diastolic dysfunction (grade II) consistent with pseudonormalization.  · Calculated EF = 30%. Estimated EF was in agreement with the calculated EF. Normal left ventricular wall thickness noted.      2014 CARDIAC CATHETERIZATION REPORT:      CONCLUSIONS AND COMMENTS: The patient is a 48-year-old  male who was  recently diagnosed to have advanced dilated cardiomyopathy. Is brought in for  elected coronary angiography to rule out any underlying significant coronary  artery disease causing his cardiomyopathy. This reveals:   1.  Normal left main coronary artery.   2.  Left anterior descending coronary artery has moderate diffuse narrowing of  about 50% in the distal segment.   3.  Left circumflex coronary artery appears angiographically normal.   4.  Right coronary artery is dominant for posterior circulation with minimal  plaquing disease.   5.  LV chamber dilatation with severely depressed global LV systolic function  with an estimated LV ejection fraction of about 30-35% with elevated LV  end-diastolic pressures.      RECOMMENDATIONS: In view of nonfocal and nonsignificant degree of  atherosclerotic disease, it appears that his cardiomyopathy is nonischemic in  etiology. Hence, would continue to optimize therapy with ACE inhibitors, beta  blockers, Spironolactone, digoxin, and diuretics as needed.  We will continue  to monitor his left ventricular function and perhaps consider implantable  cardioverter-defibrillator implantation in the future if he continues to have  significantly depressed left ventricular systolic function with a left  ventricular ejection fraction of less than 35%.     I have personally reviewed the above radiology images and read the final radiology report on  10/05/21         ---------------------------------------------------------------------------------------------------------------------  Assessment / Plan     Active Hospital Problems    Diagnosis  POA   • **Occipital infarction (HCC) [I63.9]  Yes   • Sepsis due to pneumonia (HCC) [J18.9, A41.9]  Yes       ASSESSMENT/PLAN:  -Acute right occipital infarct:   -Chronic appearing subcortical infarct left frontal lobe:   -Mild carotid plaquing R>L:   • Imaging studies reviewed including CTs and MRI.   • Tele-Neuro consult reviewed from Dr. Hannon with recommendation of echocardiogram and recommendation to start on anticoagulation if EF is less than 35%.    • ASA/statin.   • AM lipid panel.   • PT/OT/SLP.   • Neuro checks.   • AM Echocardiogram ordered.   • Continue Tele Neuro stroke consult.   • Given palpitations, may benefit from outpatient event monitor for further evaluation of possible underlying arrhythmia.    • Continuous cardiac monitoring.     -Chest pain with known history of CAD:   -Combined diastolic & systolic CHF with prior EF as low as 30% per 2018 echocardiogram report:   · Continuous cardiac monitoring   · Aspirin added. Atorvastatin on board.   · Echocardiogram in the AM.   · Troponin T unremarkable x2.    · COVID/flu swab negative.   · CT angio neck reviewed with lung apexes concerning for possibly underlying pneumonia.    · Consult Cardiology for further evaluation given concern for underlying arrhythmia and prior hx of CAD and known cardiomyopathy in the past.    · May benefit from outpatient event monitor at discharge.   · 2014 cardiac catheterization report reviewed with:    1.Normal left main coronary artery.    2.  Left anterior descending coronary artery has moderate diffuse narrowing of   about 50% in the distal segment.    3.  Left circumflex coronary artery appears angiographically normal.    4.  Right coronary artery is dominant for posterior circulation with minimal   plaquing disease.    5.   LV chamber dilatation with severely depressed global LV systolic function   with an estimated LV ejection fraction of about 30-35% with elevated LV  end-diastolic pressures.     -Sepsis criteria met on admission with mild leukocytosis & HR>90 with PNA on upper lung lobes on CT neck:   · Check UA.   · Add IV Rocephin & IV doxycycline.   · Respiratory PCR & sputum culture added.    · CXR without acute infiltrate; however, CT neck concerning for patchy opacities and centrilobular nodules of the right greater than left upper lobes in a pattern and distribution most consistent with pneumonia  · Obtain CT chest without contrast for further evaluation.   · COVID/flu swab negative.    · Received Pfizer COVID-19 immunizations on March 15, 2021 in April 5, 2021 per review of record of last PCP visit.   · Thanked patient for vaccinating.    · AM CBC.   · Blood cultures added x2.   · Vitals per hospital protocol.     -Hyperglycemia in the setting of known DM 2:   -Pseudohyponatremia 2/2 hyperglycemia:  Given diabetes mellitus, fingerstick blood glucose monitoring has been ordered AC&HS.   Sliding scale insulin has been ordered PRN.   Hemoglobin a1c pending.   Home DM regimen has included Glipizide and Metformin as recently as September 2021.  Will hold Metformin for now given contrast received for scan procedures.    DM educator consultation may be utilized.      -Headache reported in ED, resolved with tylenol:   · MRI reviewed as outlined.   · Add PRN tylenol.   · Neurochecks on board.      -Hypomagnesemia:   Electrolyte replacement per protocol.   Continuous cardiac monitoring.   Review EKG.       -Positive cologuard test per review of outside labs in 09/2020:   · Needs further outpatient referral for screening colonoscopy.       -Obesity with BMI 35.71 kg/m2      ----------  -DVT prophylaxis: SCDs  -Activity: Up with assist  -Expected length of stay: INPATIENT status due to the need for care which can only be reasonably  provided in an hospital setting such as aggressive/expedited ancillary services and/or consultation services, the necessity for IV medications, close physician monitoring and/or the possible need for procedures.  In such, I feel patient’s risk for adverse outcomes and need for care warrant INPATIENT evaluation and predict the patient’s care encounter to likely last beyond 2 midnights.  -Disposition:  Plans to return home at discharge. Needs further follow-up with PCP as well regarding positive Cologuard test in 09/2020.      High risk secondary to acute CVA, chest pain, sepsis 2/2 PNA, hyperglycemia with DM        Liv Bailey PA-C  10/05/21  20:46 EDT  Pager # [phone]  ---------------------------------------------------------------------------------------------------------------------

## 2021-10-05 NOTE — ED NOTES
MEDICAL SCREENING:    Reason for Visit: chest pain    Patient initially seen in triage.  The patient was advised further evaluation and diagnostic testing will be needed, some of the treatment and testing will be initiated in the lobby in order to begin the process.  The patient will be returned to the waiting area for the time being and possibly be re-assessed by a subsequent ED provider.  The patient will be brought back to the treatment area in as timely manner as possible.       Giovana Price PA  10/05/21 1101

## 2021-10-05 NOTE — ED PROVIDER NOTES
Subjective   57 yo male patient presents to the ED with complaints of chest pain. Patient states that when he lies flat the pain worsens and feels like he is smothering. Patient states that the last time he felt like this he was in congestive heart failure. Pt reports feeling weak in his legs and severe HA. Patient states that he has felt like this for a week. Patient denies any fevers,cough, congestion, n/v/d. Patient states that he did take a lasix this morning but has not seemed to help. Patient sees Dr. Burnham.  PMHX- DM, hyperlipidemia, and HTN.       History provided by:  Patient   used: No        Review of Systems   Constitutional: Negative.    HENT: Negative.    Eyes: Negative.    Respiratory: Negative.    Cardiovascular: Positive for chest pain.   Gastrointestinal: Negative.    Endocrine: Negative.    Genitourinary: Negative.    Musculoskeletal: Negative.    Skin: Negative.    Allergic/Immunologic: Negative.    Neurological: Negative.    Hematological: Negative.    Psychiatric/Behavioral: Negative.    All other systems reviewed and are negative.      Past Medical History:   Diagnosis Date   • Diabetes mellitus (HCC)    • Hyperlipidemia    • Hypertension        No Known Allergies    Past Surgical History:   Procedure Laterality Date   • CARDIAC CATHETERIZATION      no stents       History reviewed. No pertinent family history.    Social History     Socioeconomic History   • Marital status:      Spouse name: Not on file   • Number of children: Not on file   • Years of education: Not on file   • Highest education level: Not on file   Tobacco Use   • Smoking status: Never Smoker   • Smokeless tobacco: Never Used   Substance and Sexual Activity   • Alcohol use: No   • Drug use: No   • Sexual activity: Defer           Objective   Physical Exam  Vitals and nursing note reviewed.   Constitutional:       Appearance: He is well-developed and normal weight.   HENT:      Head: Normocephalic  and atraumatic.   Eyes:      Extraocular Movements: Extraocular movements intact.      Pupils: Pupils are equal, round, and reactive to light.   Cardiovascular:      Rate and Rhythm: Normal rate and regular rhythm.      Heart sounds: Normal heart sounds.   Pulmonary:      Effort: Pulmonary effort is normal.      Breath sounds: Normal breath sounds.   Abdominal:      General: Bowel sounds are normal.      Palpations: Abdomen is soft.   Musculoskeletal:         General: Normal range of motion.      Cervical back: Normal range of motion and neck supple.   Skin:     General: Skin is warm and dry.      Capillary Refill: Capillary refill takes less than 2 seconds.   Neurological:      General: No focal deficit present.      Mental Status: He is alert and oriented to person, place, and time.      Comments: NIH 0   Psychiatric:         Mood and Affect: Mood normal.         Behavior: Behavior normal.         Procedures           ED Course  ED Course as of Oct 05 1755   Tue Oct 05, 2021   0902 EKG noted sinus tachycardia.  Possible incomplete left bundle branch block noted.  Rate 109 bpm.   ms.   ms.  No acute ST abnormality noted    [SF]   1334 IMPRESSION:    Stable unremarkable chest.     This report was finalized on 10/5/2021 12:46 PM by Dr. Taj Solares MD.           Specimen Collected: 10/05/21 12:46 Last Resulted: 10/05/21 12:46           XR Chest 1 View [ML]   1426    IMPRESSION:     1. Vague area of decreased attenuation in the right occipital region  could represent focus of ischemia. Recommend clinical correlation. If  suspicion is high, then consider MRI  2. No focal parenchymal hemorrhage or midline shift     This report was finalized on 10/5/2021 2:20 PM by Dr. Raji Burnham MD.           Specimen Collected: 10/05/21 14:19         CT Head Without Contrast [ML]   1431 Discussed with Dr. Barron - recommends proceeding with MRI w/wo contrast brain.     [ML]   1556 IMPRESSION:    Right occipital region  acute infarct.     This report was finalized on 10/5/2021 3:37 PM by Dr. Taj Solares MD.           Specimen Collected: 10/05/21 15:35         MRI Brain With & Without Contrast [ML]   1609 D/W Dr. Barron- recommends consulting neuro     [ML]   1615 Discussed with Dr. Hannon - recommends admission, CTA head &neck, echo, ASA, statins    [ML]   1718 IMPRESSION:  1.  Mild plaque of the right greater than left carotid systems. No  occlusion or hemodynamically significant stenosis.  2.  Vertebrobasilar circulation appears within normal limits.  3.  Patchy opacities and centrilobular nodules of the right greater than  left upper lobes in a pattern and distribution most consistent with  pneumonia. Correlate with clinical history.  4.  Other nonacute findings as above.     This report was finalized on 10/5/2021 5:05 PM by Dr. Taj Solares MD.           Specimen Collected: 10/05/21 17:02 Last Resulted: 10/05/21 17:05         CT Angiogram Neck [ML]   1718 IMPRESSION:  1.  Mild plaque of the right greater than left carotid systems. No  occlusion or hemodynamically significant stenosis.  2.  Vertebrobasilar circulation appears within normal limits.  3.  Patchy opacities and centrilobular nodules of the right greater than  left upper lobes in a pattern and distribution most consistent with  pneumonia. Correlate with clinical history.  4.  Other nonacute findings as above.     This report was finalized on 10/5/2021 5:05 PM by Dr. Taj Solares MD.           Specimen Collected: 10/05/21 17:02 Last Resulted: 10/05/21 17:05         CT Angiogram Head [ML]   1722 Dr. Hannon had tele conference with patient.  Recommends admission.  [facility] hospitalist     [ML]   9501 D/W Dr. Hernandez - she will admit     [ML]      ED Course User Index  [ML] Carissa Cole PA  [SF] Barber Barron DO                                           MDM  Number of Diagnoses or Management Options     Amount and/or Complexity of Data Reviewed  Clinical lab  tests: ordered and reviewed  Tests in the radiology section of CPT®: ordered and reviewed  Discuss the patient with other providers: yes    Risk of Complications, Morbidity, and/or Mortality  Presenting problems: moderate  Diagnostic procedures: moderate  Management options: moderate        Final diagnoses:   Occipital infarction (HCC)       ED Disposition  ED Disposition     ED Disposition Condition Comment    Decision to Admit            No follow-up provider specified.       Medication List      No changes were made to your prescriptions during this visit.          Carissa Cole PA  10/05/21 0542

## 2021-10-06 ENCOUNTER — APPOINTMENT (OUTPATIENT)
Dept: CARDIOLOGY | Facility: HOSPITAL | Age: 56
End: 2021-10-06

## 2021-10-06 ENCOUNTER — APPOINTMENT (OUTPATIENT)
Dept: CT IMAGING | Facility: HOSPITAL | Age: 56
End: 2021-10-06

## 2021-10-06 LAB
AMPHET+METHAMPHET UR QL: NEGATIVE
AMPHETAMINES UR QL: NEGATIVE
B PARAPERT DNA SPEC QL NAA+PROBE: NOT DETECTED
B PERT DNA SPEC QL NAA+PROBE: NOT DETECTED
BARBITURATES UR QL SCN: NEGATIVE
BENZODIAZ UR QL SCN: NEGATIVE
BH CV ECHO MEAS - % IVS THICK: -7.2 %
BH CV ECHO MEAS - % LVPW THICK: 35.3 %
BH CV ECHO MEAS - ACS: 2.1 CM
BH CV ECHO MEAS - AO ROOT AREA (BSA CORRECTED): 1.5
BH CV ECHO MEAS - AO ROOT AREA: 8.6 CM^2
BH CV ECHO MEAS - AO ROOT DIAM: 3.3 CM
BH CV ECHO MEAS - BSA(HAYCOCK): 2.3 M^2
BH CV ECHO MEAS - BSA: 2.2 M^2
BH CV ECHO MEAS - BZI_BMI: 35.6 KILOGRAMS/M^2
BH CV ECHO MEAS - BZI_METRIC_HEIGHT: 175.3 CM
BH CV ECHO MEAS - BZI_METRIC_WEIGHT: 109.3 KG
BH CV ECHO MEAS - EDV(CUBED): 422.7 ML
BH CV ECHO MEAS - EDV(MOD-SP4): 175 ML
BH CV ECHO MEAS - EDV(TEICH): 298.7 ML
BH CV ECHO MEAS - EF(CUBED): 35.3 %
BH CV ECHO MEAS - EF(MOD-SP4): 24.6 %
BH CV ECHO MEAS - EF(TEICH): 27.9 %
BH CV ECHO MEAS - ESV(CUBED): 273.4 ML
BH CV ECHO MEAS - ESV(MOD-SP4): 132 ML
BH CV ECHO MEAS - ESV(TEICH): 215.2 ML
BH CV ECHO MEAS - FS: 13.5 %
BH CV ECHO MEAS - IVS/LVPW: 1
BH CV ECHO MEAS - IVSD: 1.1 CM
BH CV ECHO MEAS - IVSS: 1 CM
BH CV ECHO MEAS - LA DIMENSION: 4.2 CM
BH CV ECHO MEAS - LA/AO: 1.3
BH CV ECHO MEAS - LV DIASTOLIC VOL/BSA (35-75): 78.3 ML/M^2
BH CV ECHO MEAS - LV MASS(C)D: 410.4 GRAMS
BH CV ECHO MEAS - LV MASS(C)DI: 183.5 GRAMS/M^2
BH CV ECHO MEAS - LV MASS(C)S: 379.7 GRAMS
BH CV ECHO MEAS - LV MASS(C)SI: 169.8 GRAMS/M^2
BH CV ECHO MEAS - LV SYSTOLIC VOL/BSA (12-30): 59 ML/M^2
BH CV ECHO MEAS - LVIDD: 7.5 CM
BH CV ECHO MEAS - LVIDS: 6.5 CM
BH CV ECHO MEAS - LVLD AP4: 8 CM
BH CV ECHO MEAS - LVLS AP4: 7.9 CM
BH CV ECHO MEAS - LVOT AREA (M): 3.8 CM^2
BH CV ECHO MEAS - LVOT AREA: 3.8 CM^2
BH CV ECHO MEAS - LVOT DIAM: 2.2 CM
BH CV ECHO MEAS - LVPWD: 1.1 CM
BH CV ECHO MEAS - LVPWS: 1.5 CM
BH CV ECHO MEAS - MV A MAX VEL: 64.7 CM/SEC
BH CV ECHO MEAS - MV E MAX VEL: 99 CM/SEC
BH CV ECHO MEAS - MV E/A: 1.5
BH CV ECHO MEAS - PA ACC TIME: 0.07 SEC
BH CV ECHO MEAS - PA PR(ACCEL): 48.9 MMHG
BH CV ECHO MEAS - RVDD: 1.2 CM
BH CV ECHO MEAS - SI(CUBED): 66.8 ML/M^2
BH CV ECHO MEAS - SI(MOD-SP4): 19.2 ML/M^2
BH CV ECHO MEAS - SI(TEICH): 37.3 ML/M^2
BH CV ECHO MEAS - SV(CUBED): 149.4 ML
BH CV ECHO MEAS - SV(MOD-SP4): 43 ML
BH CV ECHO MEAS - SV(TEICH): 83.5 ML
BILIRUB UR QL STRIP: NEGATIVE
BUPRENORPHINE SERPL-MCNC: NEGATIVE NG/ML
C PNEUM DNA NPH QL NAA+NON-PROBE: NOT DETECTED
CANNABINOIDS SERPL QL: NEGATIVE
CHOLEST SERPL-MCNC: 129 MG/DL (ref 0–200)
CLARITY UR: CLEAR
COCAINE UR QL: NEGATIVE
COLOR UR: YELLOW
FLUAV SUBTYP SPEC NAA+PROBE: NOT DETECTED
FLUBV RNA ISLT QL NAA+PROBE: NOT DETECTED
GLUCOSE BLDC GLUCOMTR-MCNC: 273 MG/DL (ref 70–130)
GLUCOSE BLDC GLUCOMTR-MCNC: 286 MG/DL (ref 70–130)
GLUCOSE BLDC GLUCOMTR-MCNC: 318 MG/DL (ref 70–130)
GLUCOSE BLDC GLUCOMTR-MCNC: 387 MG/DL (ref 70–130)
GLUCOSE BLDC GLUCOMTR-MCNC: 391 MG/DL (ref 70–130)
GLUCOSE BLDC GLUCOMTR-MCNC: 462 MG/DL (ref 70–130)
GLUCOSE UR STRIP-MCNC: ABNORMAL MG/DL
HADV DNA SPEC NAA+PROBE: NOT DETECTED
HBA1C MFR BLD: 11.2 % (ref 4.8–5.6)
HCOV 229E RNA SPEC QL NAA+PROBE: NOT DETECTED
HCOV HKU1 RNA SPEC QL NAA+PROBE: NOT DETECTED
HCOV NL63 RNA SPEC QL NAA+PROBE: NOT DETECTED
HCOV OC43 RNA SPEC QL NAA+PROBE: NOT DETECTED
HDLC SERPL-MCNC: 34 MG/DL (ref 40–60)
HGB UR QL STRIP.AUTO: NEGATIVE
HMPV RNA NPH QL NAA+NON-PROBE: NOT DETECTED
HPIV1 RNA SPEC QL NAA+PROBE: NOT DETECTED
HPIV2 RNA SPEC QL NAA+PROBE: NOT DETECTED
HPIV3 RNA NPH QL NAA+PROBE: NOT DETECTED
HPIV4 P GENE NPH QL NAA+PROBE: NOT DETECTED
KETONES UR QL STRIP: NEGATIVE
LDLC SERPL CALC-MCNC: 71 MG/DL (ref 0–100)
LDLC/HDLC SERPL: 2.02 {RATIO}
LEUKOCYTE ESTERASE UR QL STRIP.AUTO: NEGATIVE
M PNEUMO IGG SER IA-ACNC: NOT DETECTED
MAXIMAL PREDICTED HEART RATE: 166 BPM
METHADONE UR QL SCN: NEGATIVE
NITRITE UR QL STRIP: NEGATIVE
OPIATES UR QL: NEGATIVE
OXYCODONE UR QL SCN: NEGATIVE
PCP UR QL SCN: NEGATIVE
PH UR STRIP.AUTO: <=5 [PH] (ref 5–8)
PROPOXYPH UR QL: NEGATIVE
PROT UR QL STRIP: NEGATIVE
QT INTERVAL: 350 MS
QTC INTERVAL: 471 MS
RHINOVIRUS RNA SPEC NAA+PROBE: NOT DETECTED
RSV RNA NPH QL NAA+NON-PROBE: NOT DETECTED
SP GR UR STRIP: >1.03 (ref 1–1.03)
STRESS TARGET HR: 141 BPM
TRICYCLICS UR QL SCN: NEGATIVE
TRIGL SERPL-MCNC: 132 MG/DL (ref 0–150)
TROPONIN T SERPL-MCNC: <0.01 NG/ML (ref 0–0.03)
UROBILINOGEN UR QL STRIP: ABNORMAL
VLDLC SERPL-MCNC: 24 MG/DL (ref 5–40)

## 2021-10-06 PROCEDURE — 63710000001 INSULIN ASPART PER 5 UNITS: Performed by: INTERNAL MEDICINE

## 2021-10-06 PROCEDURE — 84484 ASSAY OF TROPONIN QUANT: CPT | Performed by: INTERNAL MEDICINE

## 2021-10-06 PROCEDURE — 80306 DRUG TEST PRSMV INSTRMNT: CPT | Performed by: PHYSICIAN ASSISTANT

## 2021-10-06 PROCEDURE — 93306 TTE W/DOPPLER COMPLETE: CPT

## 2021-10-06 PROCEDURE — 71250 CT THORAX DX C-: CPT | Performed by: RADIOLOGY

## 2021-10-06 PROCEDURE — 97162 PT EVAL MOD COMPLEX 30 MIN: CPT

## 2021-10-06 PROCEDURE — G0378 HOSPITAL OBSERVATION PER HR: HCPCS

## 2021-10-06 PROCEDURE — 81003 URINALYSIS AUTO W/O SCOPE: CPT | Performed by: PHYSICIAN ASSISTANT

## 2021-10-06 PROCEDURE — 82962 GLUCOSE BLOOD TEST: CPT

## 2021-10-06 PROCEDURE — 25010000002 CEFTRIAXONE PER 250 MG: Performed by: PHYSICIAN ASSISTANT

## 2021-10-06 PROCEDURE — 83036 HEMOGLOBIN GLYCOSYLATED A1C: CPT | Performed by: INTERNAL MEDICINE

## 2021-10-06 PROCEDURE — 80061 LIPID PANEL: CPT | Performed by: INTERNAL MEDICINE

## 2021-10-06 PROCEDURE — 93306 TTE W/DOPPLER COMPLETE: CPT | Performed by: INTERNAL MEDICINE

## 2021-10-06 PROCEDURE — 63710000001 INSULIN DETEMIR PER 5 UNITS: Performed by: PHYSICIAN ASSISTANT

## 2021-10-06 PROCEDURE — 97165 OT EVAL LOW COMPLEX 30 MIN: CPT

## 2021-10-06 PROCEDURE — 99233 SBSQ HOSP IP/OBS HIGH 50: CPT | Performed by: PSYCHIATRY & NEUROLOGY

## 2021-10-06 PROCEDURE — 71250 CT THORAX DX C-: CPT

## 2021-10-06 PROCEDURE — 99233 SBSQ HOSP IP/OBS HIGH 50: CPT | Performed by: INTERNAL MEDICINE

## 2021-10-06 PROCEDURE — 87633 RESP VIRUS 12-25 TARGETS: CPT | Performed by: PHYSICIAN ASSISTANT

## 2021-10-06 PROCEDURE — 25010000002 MAGNESIUM SULFATE 2 GM/50ML SOLUTION: Performed by: PHYSICIAN ASSISTANT

## 2021-10-06 RX ORDER — ASPIRIN 81 MG/1
81 TABLET, CHEWABLE ORAL DAILY
Status: DISCONTINUED | OUTPATIENT
Start: 2021-10-07 | End: 2021-10-07 | Stop reason: HOSPADM

## 2021-10-06 RX ORDER — SPIRONOLACTONE 25 MG/1
25 TABLET ORAL DAILY
Status: DISCONTINUED | OUTPATIENT
Start: 2021-10-06 | End: 2021-10-07 | Stop reason: HOSPADM

## 2021-10-06 RX ORDER — GUAIFENESIN 600 MG/1
1200 TABLET, EXTENDED RELEASE ORAL EVERY 12 HOURS SCHEDULED
Status: DISCONTINUED | OUTPATIENT
Start: 2021-10-06 | End: 2021-10-07 | Stop reason: HOSPADM

## 2021-10-06 RX ORDER — CARVEDILOL 6.25 MG/1
6.25 TABLET ORAL 2 TIMES DAILY WITH MEALS
Status: DISCONTINUED | OUTPATIENT
Start: 2021-10-06 | End: 2021-10-07 | Stop reason: HOSPADM

## 2021-10-06 RX ORDER — LOSARTAN POTASSIUM 50 MG/1
50 TABLET ORAL DAILY
Status: DISCONTINUED | OUTPATIENT
Start: 2021-10-06 | End: 2021-10-06

## 2021-10-06 RX ADMIN — ACETAMINOPHEN 650 MG: 325 TABLET ORAL at 02:09

## 2021-10-06 RX ADMIN — INSULIN DETEMIR 10 UNITS: 100 INJECTION, SOLUTION SUBCUTANEOUS at 20:37

## 2021-10-06 RX ADMIN — MAGNESIUM SULFATE HEPTAHYDRATE 2 G: 40 INJECTION, SOLUTION INTRAVENOUS at 02:06

## 2021-10-06 RX ADMIN — SPIRONOLACTONE 25 MG: 25 TABLET ORAL at 13:03

## 2021-10-06 RX ADMIN — DOXYCYCLINE 100 MG: 100 INJECTION, POWDER, LYOPHILIZED, FOR SOLUTION INTRAVENOUS at 20:36

## 2021-10-06 RX ADMIN — INSULIN ASPART 3 UNITS: 100 INJECTION, SOLUTION INTRAVENOUS; SUBCUTANEOUS at 16:54

## 2021-10-06 RX ADMIN — CEFTRIAXONE 1 G: 1 INJECTION, POWDER, FOR SOLUTION INTRAMUSCULAR; INTRAVENOUS at 20:36

## 2021-10-06 RX ADMIN — INSULIN ASPART 5 UNITS: 100 INJECTION, SOLUTION INTRAVENOUS; SUBCUTANEOUS at 11:52

## 2021-10-06 RX ADMIN — LOSARTAN POTASSIUM 50 MG: 50 TABLET, FILM COATED ORAL at 08:31

## 2021-10-06 RX ADMIN — MAGNESIUM SULFATE HEPTAHYDRATE 2 G: 40 INJECTION, SOLUTION INTRAVENOUS at 00:03

## 2021-10-06 RX ADMIN — DOXYCYCLINE 100 MG: 100 INJECTION, POWDER, LYOPHILIZED, FOR SOLUTION INTRAVENOUS at 11:52

## 2021-10-06 RX ADMIN — CARVEDILOL 6.25 MG: 6.25 TABLET, FILM COATED ORAL at 16:54

## 2021-10-06 RX ADMIN — APIXABAN 5 MG: 5 TABLET, FILM COATED ORAL at 13:03

## 2021-10-06 RX ADMIN — ACETAMINOPHEN 650 MG: 325 TABLET ORAL at 22:07

## 2021-10-06 RX ADMIN — CARVEDILOL 6.25 MG: 6.25 TABLET, FILM COATED ORAL at 08:30

## 2021-10-06 RX ADMIN — SODIUM CHLORIDE, PRESERVATIVE FREE 10 ML: 5 INJECTION INTRAVENOUS at 08:31

## 2021-10-06 RX ADMIN — INSULIN ASPART 7 UNITS: 100 INJECTION, SOLUTION INTRAVENOUS; SUBCUTANEOUS at 16:54

## 2021-10-06 RX ADMIN — GUAIFENESIN 1200 MG: 600 TABLET ORAL at 02:06

## 2021-10-06 RX ADMIN — GUAIFENESIN 1200 MG: 600 TABLET ORAL at 20:35

## 2021-10-06 RX ADMIN — INSULIN ASPART 7 UNITS: 100 INJECTION, SOLUTION INTRAVENOUS; SUBCUTANEOUS at 20:35

## 2021-10-06 RX ADMIN — ASPIRIN 325 MG: 325 TABLET ORAL at 08:30

## 2021-10-06 RX ADMIN — APIXABAN 5 MG: 5 TABLET, FILM COATED ORAL at 20:35

## 2021-10-06 RX ADMIN — INSULIN ASPART 4 UNITS: 100 INJECTION, SOLUTION INTRAVENOUS; SUBCUTANEOUS at 08:30

## 2021-10-06 RX ADMIN — GUAIFENESIN 1200 MG: 600 TABLET ORAL at 08:31

## 2021-10-06 RX ADMIN — ATORVASTATIN CALCIUM 80 MG: 40 TABLET, FILM COATED ORAL at 20:36

## 2021-10-06 NOTE — PLAN OF CARE
Goal Outcome Evaluation:  Plan of Care Reviewed With: patient           Outcome Summary: Pt is independent PLOF and is independent/SBA for ambulation today. Pt ambulated 200 ft in hallway with no AD. No strength or coordination deficits identified on eval. Pt can be d/c from PT services with anticipated discharge home.

## 2021-10-06 NOTE — PROGRESS NOTES
HOSPITALIST PROGRESS NOTE    Patient Identification:  Name:  Csa Mccabe  Age:  56 y.o.  Sex:  male  :  1965  MRN:  [id number]  Visit Number:  [id number]  Primary Care Provider:  Monique Gambino APRN    Length of stay:  0     HPI: 55 yo male being seen in follow up for chest pain, SOA and headache    Subjective:  Patient was seen and examined at bedside.  He is sitting on the side of the bed and overall states that he is feeling better.  He states that his blurry vision has almost completely resolved.  He currently denied any chest pains or shortness of air.  He reports that his shortness of air is exacerbated when trying to lie supine.  He currently denies any nausea or vomiting.  He reports that recently he has not been compliant with a diabetic friendly diet.  He states that his job as a [employer]  makes this difficult.    Present during exam: N/A    Current Hospital Meds:  apixaban, 5 mg, Oral, Q12H  [START ON 10/7/2021] aspirin, 81 mg, Oral, Daily  atorvastatin, 80 mg, Oral, Nightly  carvedilol, 6.25 mg, Oral, BID With Meals  cefTRIAXone, 1 g, Intravenous, Q24H  doxycycline, 100 mg, Intravenous, Q12H  guaiFENesin, 1,200 mg, Oral, Q12H  insulin aspart, 0-7 Units, Subcutaneous, 4x Daily AC & at Bedtime  insulin detemir, 10 Units, Subcutaneous, Nightly  [START ON 10/7/2021] sacubitril-valsartan, 1 tablet, Oral, Q12H  sodium chloride, 10 mL, Intravenous, Q12H  spironolactone, 25 mg, Oral, Daily      Vital Signs  Temp:  [97.6 °F (36.4 °C)-98.2 °F (36.8 °C)] 98.2 °F (36.8 °C)  Heart Rate:  [90-96] 94  Resp:  [15-18] 18  BP: (113-135)/(70-88) 121/72      10/05/21  0859 10/05/21  1900 10/06/21  0500   Weight: 112 kg (246 lb) 110 kg (241 lb 12.8 oz) 110 kg (241 lb 12.8 oz) (admit weight less than 24 hours)     Body mass index is 35.71 kg/m².    Physical exam:  Physical Exam  Constitutional:       General: He is not in acute distress.     Appearance: He is well-developed.   HENT:      Head: Normocephalic  and atraumatic.   Eyes:      Conjunctiva/sclera: Conjunctivae normal.   Neck:      Vascular: No JVD.      Trachea: No tracheal deviation.   Cardiovascular:      Rate and Rhythm: Normal rate and regular rhythm.      Heart sounds: No murmur heard.   No friction rub. No gallop.    Pulmonary:      Effort: No respiratory distress.      Breath sounds: Normal breath sounds. No wheezing or rales.   Abdominal:      General: Bowel sounds are normal. There is no distension.      Palpations: Abdomen is soft.      Tenderness: There is no abdominal tenderness. There is no guarding.   Musculoskeletal:         General: No tenderness. Normal range of motion.   Skin:     General: Skin is warm and dry.      Findings: No erythema or rash.   Neurological:      Mental Status: He is alert and oriented to person, place, and time.      Cranial Nerves: No cranial nerve deficit.         Results Review:  Results from last 7 days   Lab Units 10/05/21  1104   WBC 10*3/mm3 11.56*   HEMOGLOBIN g/dL 13.7   HEMATOCRIT % 41.6   PLATELETS 10*3/mm3 332     Results from last 7 days   Lab Units 10/05/21  1104   SODIUM mmol/L 129*   POTASSIUM mmol/L 4.7   CHLORIDE mmol/L 95*   CO2 mmol/L 25.2   BUN mg/dL 23*   CREATININE mg/dL 0.79   CALCIUM mg/dL 9.6   GLUCOSE mg/dL 315*     Results from last 7 days   Lab Units 10/05/21  1104   BILIRUBIN mg/dL 0.8   ALK PHOS U/L 65   AST (SGOT) U/L 12   ALT (SGPT) U/L 17     Results from last 7 days   Lab Units 10/05/21  1104   MAGNESIUM mg/dL 1.5*     Results from last 7 days   Lab Units 10/05/21  1104   INR  0.90     Results from last 7 days   Lab Units 10/06/21  0444 10/05/21  1304 10/05/21  1104   TROPONIN T ng/mL <0.010 <0.010 <0.010         Assessment/Plan     -Acute right posterior cerebral artery/right occipital stroke  -Headache, resolved  -Intermittent chest pain with known history of non-obstructive CAD  -Acute CHF with combined dysfunction per echocardiogram 2018  -History of nonischemic  cardiomyopathy  -Sepsis, present on admission and due to bilateral community acquired pneumonia  -Acute hypomagnesemia  -Uncontrolled diabetes mellitus type II; HgbA1c 11.2%  -Essential hypertension, currently controlled  -Dyslipidemia  -Obesity  -Chronic appearing subcortical infarct of the left frontal lobe  -Positive cologuard test September 2020    Continue with aspirin and atorvastatin.     Patient has been started on Eliquis per cardiology.  He has been resumed on his home carvedilol.  He has been started on Entresto and spironolactone.  An echocardiogram has been ordered and is currently pending.  Patient will be nothing by mouth at midnight and will undergo a stress test in a.m.    Continue with Rocephin and doxycycline for treatment of patient's community-acquired pneumonia.  Respiratory culture has been ordered and is pending.  Continue with Mucinex.    Patient has received magnesium supplementation.  I will repeat his magnesium level in a.m.    For now, we will continue with insulin therapy, I discussed with the patient in detail regarding his A1c and how recommendations are for insulin therapy, however, patient has declined this at discharge and states he prefers to work on diet control and we may consider increasing his oral diabetic agents and possibly adding another agent at the time of discharge.    Continue to monitor on telemetry.  I will repeat his CBC and BMP in a.m.    The patient is high risk due to the following diagnoses/reasons: Acute stroke, acute CHF, sepsis, uncontrolled diabetes mellitus    I discussed the patients findings and my recommendations with patient.        Disposition  Home when medically stable; possibly tomorrow pending stress test results      Marivel Hernandez DO  10/06/21  15:36 EDT

## 2021-10-06 NOTE — THERAPY DISCHARGE NOTE
Acute Care - Physical Therapy Initial Evaluation/Discharge   [facility]     Patient Name: Cas Mccabe  : 1965  MRN: [id number]  Today's Date: 10/6/2021   Onset of Illness/Injury or Date of Surgery: 10/05/21     Referring Physician: Mary      Admit Date: 10/5/2021    Visit Dx:    ICD-10-CM ICD-9-CM   1. Occipital infarction (HCC)  I63.9 434.91     Patient Active Problem List   Diagnosis   • Chest pain   • Hyperlipidemia   • Hypertensive disorder   • Nonischemic congestive cardiomyopathy (HCC)   • Pain in joint of left shoulder   • Type 2 diabetes mellitus (HCC)   • Occipital infarction (HCC)   • Sepsis due to pneumonia (HCC)     Past Medical History:   Diagnosis Date   • Combined systolic and diastolic congestive heart failure (HCC)    • Diabetes mellitus (HCC)    • Hyperlipidemia    • Hypertension      Past Surgical History:   Procedure Laterality Date   • CARDIAC CATHETERIZATION      no stents          PT Assessment (last 12 hours)      PT Evaluation and Treatment     Row Name 10/06/21 1438          Physical Therapy Time and Intention    Subjective Information  no complaints  -CT     Document Type  discharge evaluation/summary;evaluation  -CT     Mode of Treatment  physical therapy  -CT     Patient Effort  good  -CT     Comment  Pt tolerated evaluation well with rest breaks provided as needed. Pt is independent PLOF and is independent/SBA for ambulation today. Pt ambulated 200 ft in hallway with no AD. No strength or coordination deficits identified on eval. Pt can be d/c from PT services with anticipated discharge home.   -CT     Row Name 10/06/21 1438          General Information    Patient Profile Reviewed  yes  -CT     Onset of Illness/Injury or Date of Surgery  10/05/21  -CT     Referring Physician  Mary  -CT     Patient Observations  alert;agree to therapy;cooperative  -CT     Prior Level of Function  independent:  -CT     Equipment Currently Used at Home  none  -CT     Existing  Precautions/Restrictions  no known precautions/restrictions  -CT     Risks Reviewed  patient:  -CT     Benefits Reviewed  patient:  -CT     Barriers to Rehab  none identified  -CT     Row Name 10/06/21 1438          Previous Level of Function/Home Environm    Household Ambulation, Premorbid Functional Level  independent  -CT     Community Ambulation, Premorbid Functional Level  independent  -CT     Row Name 10/06/21 1438          Living Environment    Current Living Arrangements  home/apartment/condo  -CT     Lives With  spouse;child(hillary), adult;grandchild(hillary)  -CT     Row Name 10/06/21 1438          Home Use of Assistive/Adaptive Equipment    Equipment Currently Used at Home  none  -CT     Row Name 10/06/21 1438          Sensory Assessment (Somatosensory)    Sensory Assessment (Somatosensory)  sensation intact  -CT     Row Name 10/06/21 1438          Cognition    Affect/Mental Status (Cognitive)  WNL  -CT     Orientation Status (Cognition)  oriented x 4  -CT     Follows Commands (Cognition)  WFL  -CT     Row Name 10/06/21 1438          Pain    Additional Documentation  -- no pain reported  -CT     Row Name 10/06/21 1438          Range of Motion Comprehensive    Comment, General Range of Motion  BLE grossly WFL  -CT     Row Name 10/06/21 1438          Strength Comprehensive (MMT)    Comment, General Manual Muscle Testing (MMT) Assessment  BLE grossly 4+/5  -CT     Row Name 10/06/21 1438          Bed Mobility    Bed Mobility  bed mobility (all) activities  -CT     All Activities, [place] (Bed Mobility)  independent  -CT     Assistive Device (Bed Mobility)  bed rails  -CT     Row Name 10/06/21 1438          Transfers    Transfers  sit-stand transfer;stand-sit transfer  -CT     Sit-Stand [place] (Transfers)  independent  -CT     Stand-Sit [place] (Transfers)  independent  -CT     Row Name 10/06/21 1438          Gait/Stairs (Locomotion)    [place] Level (Gait)  independent;standby assist  -CT      Assistive Device (Gait)  -- none  -CT     Distance in Feet (Gait)  200  -CT     Pattern (Gait)  swing-through  -CT     Row Name 10/06/21 1438          Balance    Balance Assessment  sitting static balance;standing static balance;standing dynamic balance  -CT     Static Sitting Balance  WNL  -CT     Static Standing Balance  WFL  -CT     Dynamic Standing Balance  WFL  -CT     Row Name 10/06/21 1438          Coping    Observed Emotional State  calm;cooperative;happy  -CT     Verbalized Emotional State  acceptance  -CT     Row Name 10/06/21 1438          Plan of Care Review    Plan of Care Reviewed With  patient  -CT     Outcome Summary  Pt is independent PLOF and is independent/SBA for ambulation today. Pt ambulated 200 ft in hallway with no AD. No strength or coordination deficits identified on eval. Pt can be d/c from PT services with anticipated discharge home.   -CT     Row Name 10/06/21 1438          Positioning and Restraints    Pre-Treatment Position  in bed  -CT     Post Treatment Position  bed  -CT     In Bed  sitting EOB;call light within reach;encouraged to call for assist;notified nsg  -CT     Row Name 10/06/21 1438          Therapy Assessment/Plan (PT)    Criteria for Skilled Interventions Met (PT)  no;no problems identified which require skilled intervention  -CT     Row Name 10/06/21 1438          Therapy Plan Review/Discharge Plan (PT)    Therapy Plan Review (PT)  evaluation/treatment results reviewed  -CT       User Key  (r) = Recorded By, (t) = Taken By, (c) = Cosigned By    Initials Name Provider Type    CT Gary, Ly, PT Physical Therapist              PT Recommendation and Plan  Anticipated Discharge Disposition (PT): home  Therapy Frequency (PT): evaluation only  Plan of Care Reviewed With: patient  Outcome Summary: Pt is independent PLOF and is independent/SBA for ambulation today. Pt ambulated 200 ft in hallway with no AD. No strength or coordination deficits identified on eval. Pt can be  d/c from PT services with anticipated discharge home.          Time Calculation:   PT Charges     Row Name 10/06/21 1448             Time Calculation    PT Received On  10/06/21  -CT        User Key  (r) = Recorded By, (t) = Taken By, (c) = Cosigned By    Initials Name Provider Type    CT Ly Vega, PT Physical Therapist        Therapy Charges for Today     Code Description Service Date Service Provider Modifiers Qty    09344044138 HC PT EVAL MOD COMPLEXITY 4 10/6/2021 Ly Vega, PT GP 1               PT Discharge Summary  Anticipated Discharge Disposition (PT): home    Ly Vega PT  10/6/2021

## 2021-10-06 NOTE — PROGRESS NOTES
Stroke Progress Note       Chief Complaint: Stroke noted on the scans    Subjective    Subjective     Subjective:  No acute issues overnight.  No new symptoms.  Patient still has mild blurring and mild headache, but overall he is improving.    Review of Systems   Constitutional: Negative.    Eyes: Positive for visual disturbance (Blurry vision).   Respiratory: Positive for shortness of breath.    Cardiovascular: Negative.    Gastrointestinal: Negative.    Endocrine: Negative.    Genitourinary: Negative.    Musculoskeletal: Negative.    Skin: Negative.  Negative for color change.   Neurological: Positive for headaches.   Psychiatric/Behavioral: Negative.        Objective    Objective      Temp:  [97.6 °F (36.4 °C)-98.2 °F (36.8 °C)] 97.8 °F (36.6 °C)  Heart Rate:  [] 96  Resp:  [15-20] 18  BP: (113-135)/(70-88) 135/87    Neurological Exam  Mental Status  Awake, alert and oriented to person, place and time.Alert. Speech is normal. Language is fluent with no aphasia. Attention and concentration are normal. Fund of knowledge is appropriate for level of education.     Cranial Nerves  CN II: Visual fields full to confrontation.  CN III, IV, VI: Extraocular movements intact bilaterally. Normal lids and orbits bilaterally. Pupils equal round and reactive to light bilaterally.  CN V: Facial sensation is normal.  CN VII: Full and symmetric facial movement.  CN VIII: Equal hearing bilaterally.  CN IX, X: Palate elevates symmetrically  CN XI: Shoulder shrug strength is normal.  CN XII: Tongue midline without atrophy or fasciculations.     Motor  Normal muscle bulk throughout. No fasciculations present.  No focal weakness in any extremities.     Sensory  Light touch is normal in upper and lower extremities.      Reflexes  Not assessed.     Coordination  No dysmetria.     Gait  Not assessed.        Physical Exam  Vitals and nursing note reviewed.   Constitutional:       Appearance: Normal appearance.   HENT:      Head:  Normocephalic and atraumatic.      Mouth/Throat:      Mouth: Mucous membranes are moist.      Pharynx: Oropharynx is clear.   Eyes:      General: Lids are normal.      Extraocular Movements: Extraocular movements intact.      Pupils: Pupils are equal, round, and reactive to light.   Cardiovascular:      Rate and Rhythm: Normal rate and regular rhythm.   Pulmonary:      Effort: Pulmonary effort is normal. No respiratory distress.   Musculoskeletal:      Cervical back: Normal range of motion and neck supple.   Neurological:      Mental Status: He is alert.   Psychiatric:         Mood and Affect: Mood normal.         Speech: Speech normal.         Behavior: Behavior normal.     Results Review:    I reviewed the patient's new clinical results.  Awaiting 2D echocardiogram results.  I reviewed his imaging and labs    Results for orders placed during the hospital encounter of 12/19/18    Adult Transthoracic Echo Complete W/ Cont if Necessary Per Protocol    Interpretation Summary  · The left ventricular cavity is moderate-to-severely dilated.  · Left ventricular diastolic dysfunction (grade II) consistent with pseudonormalization.  · Calculated EF = 30%. Estimated EF was in agreement with the calculated EF. Normal left ventricular wall thickness noted.            Assessment/Plan     Assessment/Plan:  56-year-old right-handed white male with known diagnosis of nonischemic CHF, with EF of 20% about 5 years ago, hypertension, diabetes, hyperlipidemia, who came in with few days of shortness of breath and headaches, and imaging showed him to have right PCA strokes, for which we were consulted.      Right posterior cerebral artery stroke.  Given patient's history, this is likely cardioembolic in etiology.    Follow-up on 2D echocardiogram and if ejection fraction less than 35%, recommend to start him on anticoagulation with NOAC.  If EF is normal, we can continue him on aspirin and statins, and will get prolonged cardiac  monitoring to look for cardiac arrhythmias.  Continue aspirin and statins for now.  Headaches, unspecified.  Patient does not have history of migraines.  His headaches are relatively mild, and could be secondary to not being able to sleep well in the last couple days.  Recommend continuing Tylenol as needed for headaches.  Congestive heart failure, systolic and diastolic, chronic.  Will follow-up on 2D echocardiogram, and see if his ejection fraction has worsened again.  Consider cardiology consult.  Essential hypertension.  Relatively well controlled.  Okay to continue normal blood pressure goals.  No need for permissive hypertension.  Diabetes mellitus type 2 poorly controlled with hyperglycemia.  His A1c is 11.2.  Will need aggressive control of his diabetes, with goal of less than 7.  Dyslipidemia.  LDL is 71, total cholesterol 129 and triglyceride of 132.   Continue full dose of statins.  Out of bed with nursing.  Healthy heart/diabetic diet.     Case was discussed with patient, nursing and will talk to the hospitalist.  Thank you for the consult.          Boo Hannon MD  10/06/21  09:04 EDT    This was an audio and video enabled telemedicine encounter.

## 2021-10-06 NOTE — PLAN OF CARE
Goal Outcome Evaluation:  Progress: improving   Pt sitting up in bed. Took a shower today. Has been ambulating well. Denies any needs

## 2021-10-06 NOTE — CASE MANAGEMENT/SOCIAL WORK
Discharge Planning Assessment   [facility]     Patient Name: Cas Mccabe  MRN: [id number]  Today's Date: 10/6/2021    Admit Date: 10/5/2021    Discharge Needs Assessment     Row Name 10/06/21 1224       Living Environment    Lives With  spouse;child(hillary), adult;grandchild(hillary)    Name(s) of Who Lives With Patient  Dipika, spouse    Current Living Arrangements  home/apartment/condo    Primary Care Provided by  self    Provides Primary Care For  no one    Family Caregiver if Needed  spouse    Family Caregiver Names  Dipika    Quality of Family Relationships  helpful;involved;supportive    Able to Return to Prior Arrangements  yes       Resource/Environmental Concerns    Resource/Environmental Concerns  none    Transportation Concerns  car, none       Transition Planning    Patient/Family Anticipates Transition to  home with family    Patient/Family Anticipated Services at Transition  none    Transportation Anticipated  family or friend will provide       Discharge Needs Assessment    Equipment Currently Used at Home  none    Concerns to be Addressed  no discharge needs identified    Anticipated Changes Related to Illness  none    Equipment Needed After Discharge  none        Discharge Plan     Row Name 10/06/21 1226       Plan    Plan Pt lives at home with spouse Dipika, daughter, and grandchild. Pt does not utilize home health services or DME at this time. PCP is Monique Gambino. Pt does not have a POA or living will. Pt's family to provide transportation home at discharge. SS to follow and assist.    Patient/Family in Agreement with Plan  yes        Demographic Summary     Row Name 10/06/21 1223       General Information    Referral Source  physician    Reason for Consult  -- stroke        ALPHONSE Encarnacion

## 2021-10-06 NOTE — THERAPY EVALUATION
Patient Name: Cas Mccabe  : 1965    MRN: [id number]                              Today's Date: 10/6/2021       Admit Date: 10/5/2021    Visit Dx:     ICD-10-CM ICD-9-CM   1. Occipital infarction (HCC)  I63.9 434.91     Patient Active Problem List   Diagnosis   • Chest pain   • Hyperlipidemia   • Hypertensive disorder   • Nonischemic congestive cardiomyopathy (HCC)   • Pain in joint of left shoulder   • Type 2 diabetes mellitus (HCC)   • Occipital infarction (HCC)   • Sepsis due to pneumonia (HCC)     Past Medical History:   Diagnosis Date   • Combined systolic and diastolic congestive heart failure (HCC)    • Diabetes mellitus (HCC)    • Hyperlipidemia    • Hypertension      Past Surgical History:   Procedure Laterality Date   • CARDIAC CATHETERIZATION      no stents     General Information     Row Name 10/06/21 1048          OT Time and Intention    Document Type  evaluation  -LM     Mode of Treatment  occupational therapy  -     Row Name 10/06/21 1048          General Information    Patient Profile Reviewed  yes  -LM     Prior Level of Function  independent:;ADL's;driving;work;all household mobility  -LM     Existing Precautions/Restrictions  no known precautions/restrictions  -LM     Barriers to Rehab  none identified  -LM     Row Name 10/06/21 1048          Living Environment    Lives With  spouse  -     Row Name 10/06/21 1048          Cognition    Orientation Status (Cognition)  oriented x 3  -LM       User Key  (r) = Recorded By, (t) = Taken By, (c) = Cosigned By    Initials Name Provider Type    LM Stefanie Woodson OT Occupational Therapist          Mobility/ADL's     Row Name 10/06/21 1049          Activities of Daily Living    BADL Assessment/Intervention  bathing;upper body dressing;lower body dressing;grooming;feeding;toileting  -LM     Row Name 10/06/21 1049          Bathing Assessment/Intervention    [place] Level (Bathing)  bathing skills;independent  -     Row Name 10/06/21 1049           Upper Body Dressing Assessment/Training    [place] Level (Upper Body Dressing)  independent  -     Row Name 10/06/21 1049          Lower Body Dressing Assessment/Training    [place] Level (Lower Body Dressing)  independent  -     Row Name 10/06/21 1049          Grooming Assessment/Training    [place] Level (Grooming)  independent  -     Row Name 10/06/21 1049          Self-Feeding Assessment/Training    [place] Level (Feeding)  independent  -     Row Name 10/06/21 1049          Toileting Assessment/Training    [place] Level (Toileting)  independent  -       User Key  (r) = Recorded By, (t) = Taken By, (c) = Cosigned By    Initials Name Provider Type    Stefanie Mckeon, OT Occupational Therapist        Obj/Interventions     Row Name 10/06/21 1050          Sensory Assessment (Somatosensory)    Sensory Assessment (Somatosensory)  sensation intact  -     Row Name 10/06/21 1050          Vision Assessment/Intervention    Visual Impairment/Limitations  blurry vision;other (see comments) patient does report slight visual changes only  -     Row Name 10/06/21 1050          Range of Motion Comprehensive    General Range of Motion  no range of motion deficits identified  -     Row Name 10/06/21 1050          Strength Comprehensive (MMT)    General Manual Muscle Testing (MMT) Assessment  no strength deficits identified  -       User Key  (r) = Recorded By, (t) = Taken By, (c) = Cosigned By    Initials Name Provider Type    Stefanie Mckeon, OT Occupational Therapist        Goals/Plan    No documentation.       Clinical Impression     Row Name 10/06/21 1051          Plan of Care Review    Plan of Care Reviewed With  patient  -     Row Name 10/06/21 1051          Therapy Assessment/Plan (OT)    Criteria for Skilled Therapeutic Interventions Met (OT)  no;does not meet criteria for skilled intervention;no problems identified which require skilled intervention Patient appears  to be at baseline.  Patient reports slight visual change, BLE weakness has resolved.  -LM     Therapy Frequency (OT)  evaluation only  -LM     Row Name 10/06/21 1051          Therapy Plan Review/Discharge Plan (OT)    Anticipated Discharge Disposition (OT)  home  -LM     Row Name 10/06/21 1051          Positioning and Restraints    Post Treatment Position  bed  -LM     In Bed  call light within reach  -LM       User Key  (r) = Recorded By, (t) = Taken By, (c) = Cosigned By    Initials Name Provider Type    LM Stefanie Woodson, OT Occupational Therapist        Outcome Measures    No documentation.           OT Recommendation and Plan  Therapy Frequency (OT): evaluation only  Plan of Care Review  Plan of Care Reviewed With: patient     Time Calculation:     Therapy Charges for Today     Code Description Service Date Service Provider Modifiers Qty    [id number]  OT EVAL LOW COMPLEXITY 4 10/6/2021 Stefanie Woodson OT GO 1               Stefanie Woodson OT  10/6/2021

## 2021-10-06 NOTE — H&P (VIEW-ONLY)
Inpatient Cardiology Consult  Consult performed by: Nayeli Yun APRN  Consult ordered by: Liv Bailey PA-C        Date of Admit: 10/5/2021  Date of Consult: 10/06/21  Provider, No Known  Cas Mccabe  1965    Consulting Physician: Fabian Arnold MD    Cardiology consultation    Reason for consultation: Chest pain, EF 30%    Assessment:  1. Chest pain in the setting of known nonobstructive coronary artery disease, troponins have been negative x3 and no acute ischemic changes noted on EKG.  2. Nonischemic cardiomyopathy  3. Nonobstructive coronary artery disease per cardiac catheterization of 2/18/2014.  Findings include 1 vessel disease with a 50% stenosis of the LAD in the distal segment.  LVEF noted to be between 30 and 35%.  4. Acute right occipital infarct  5. Hypertension  6. Dyslipidemia  7. Diabetes, not controlled.  8. Obesity      Recommendations:  1. We would recommend proceeding with a nuclear stress test.  We will plan for the a.m.  Patient n.p.o. after midnight.  Patient to continue aspirin, atorvastatin, and carvedilol.  2. With regard to cardiomyopathy, we will start patient on Entresto and Aldactone.  Continue carvedilol.  Importance of these medications have been discussed with the patient.  3. Due to patient's admitting a diagnosis of acute right occipital infarct and he is symptom of palpitations, we would recommend placement of event monitor at discharge.  Thus far, patient has not had any arrhythmia during this hospitalization.  4. Patient counseled regarding blood sugar control in the presence of known coronary artery disease.      History of Present Illness    Subjective     Chief Complaint   Patient presents with   • Chest Pain       Cas Mccabe is a 56 y.o. male with past medical history significant for nonobstructive coronary artery disease per cardiac catheterization of 2/18/2014, nonischemic cardiomyopathy, hyperlipidemia, hypertension, diabetes, and obesity.   The patient presented to the ED on 10/5/2021 with complaint of chest pain, headache and dyspnea.  Patient also reported some visual changes.  He was diagnosed with an acute right occipital infarct and admitted for further evaluation and management.  Cardiology was consulted for patient complaint of chest pain.    Patient was seen and examined.  He reports that he has had pressure in his chest for at least a week.  He states that the onset is usually in the afternoon when he is working.  He reports he came to the hospital due to ongoing headache which is unusual for him.  He also states that he noted on his way to the hospital that he was having some blurred vision.  Patient reports occasional palpitations or sensation that his heart is beating fast.    Patient has a history of cardiomyopathy, first diagnosed in 2014.  He states that at some point he stopped taking his medications and had a echocardiogram.  After the echocardiogram he was placed back on his medications.  He has not seen a cardiologist since his initial diagnosis in 2014.    Cardiac risk factors:diabetes mellitus, hypercholesterolemia, hypertension and Obesity    Last Echo: 12/20/2018    Interpretation Summary    · The left ventricular cavity is moderate-to-severely dilated.  · Left ventricular diastolic dysfunction (grade II) consistent with pseudonormalization.  · Calculated EF = 30%. Estimated EF was in agreement with the calculated EF. Normal left ventricular wall thickness noted.         Last Stress:     Last Cath: 2/18/2014    Conclusion    [place], [place] [address]  [phone]     NAME:                    SLOAN FITZGERALD  ROOM NUMBER:             [id number]  MEDICAL RECORD NUMBER:   [id number]  PHYSICIAN:                    Cory Holden MD, [facility]  PRIMARY CARE PROVIDER:     DATE OF PROCEDURE:       02/18/2014     YOB: 1965     REFERRING PHYSICIAN:  Dr. Girish Joel  Gibran.     HISTORY OF PRESENT ILLNESS: The patient is a 48-year-old  male who was  recently diagnosed to have an advanced dilated cardiomyopathy. He has no  obvious etiology noted for his cardiomyopathy. He has some risk factors for  coronary artery disease. Hence, he was given the option of further cardiac  evaluation with cardiac catheterization/coronary arteriography to rule out any  underlying significant coronary artery disease that might be causing his  cardiomyopathy and to help with the decision regarding further management. The  procedure of cardiac catheterization and potential risks and complications, and  potential benefits and alternative methods of management have been explained to  the patient and he expressed understanding of the same and is wanting to  proceed. Informed consent was obtained. A timeout was performed prior to  starting the procedure to identify the right patient and the right procedure.      II. OPERATIVE REPORT:   PROCEDURE: Left heart catheterization with selective coronary arteriography and  left ventricular angiography.      TECHNIQUE: Percutaneous access of the right femoral artery by Seldinger's  technique. Coronary arteriography was done by Jag technique.      CATHETERS: A 5-British sheath in the right femoral artery, 5-British Jag left  #4,   5 British 3-D RC, and 5-British angle pigtail catheters.      CONTRAST: Optiray total of about 85 mLs.      COMPLICATIONS: None. At the procedure, a Terence pad was used to obtain  adequate hemostasis. For further details of the procedure, please review the  record maintained by the monitor tech.       III. RESULTS:   A. HEMODYNAMICS.   PREANGIOGRAPHIC PRESSURE:   Aortic pressure 116/78 (94) mmHg.   Left ventricular pressure 117/20 mmHg.       POSTANGIOGRAPHIC PRESSURE:   Left ventricular pressure 116/15 mmHg.    Aortic pressure 122/77 (mean 96) mmHg.    B.  Angiograms:   1.  CORONARY ARTERIOGRAMS: On fluoroscopy, there was  no significant epicardial  calcification noted.      Left main coronary artery - is normal and bifurcates into medium-size left  anterior descending and left circumflex system in the usual fashion.      Left anterior descending coronary artery - gives rise to a medium-size diagonal  branch from the mid segment with overall mild plaquing disease noted. Distal  segment of the LAD has diffuse narrowing of about 50%.      Left circumflex coronary artery - gives rise to a very proximal obtuse marginal  branch that almost arises as a ramus medianus branch and appears  angiographically normal. The ongoing circumflex gives rise to a medium-size  obtuse marginal branch from the proximal segment, small posterolateral branches  from the mid segment with overall minimal plaquing disease noted.      Right coronary artery - is a medium caliber vessel and is dominant for  posterior circulation. It appears angiographically normal giving rise to a  medium to large size posterior descending artery branch and a smaller  posterolateral branch that has mild plaquing disease.      2.  LEFT VENTRICULAR ANGIOGRAM: Was done in 30' MARIO projection. It reveals LV  chamber dilatation with severely depressed global LV systolic function with a  visually estimated LV ejection fraction of about 30-35%.      CONCLUSIONS AND COMMENTS: The patient is a 48-year-old  male who was  recently diagnosed to have advanced dilated cardiomyopathy. Is brought in for  elected coronary angiography to rule out any underlying significant coronary  artery disease causing his cardiomyopathy. This reveals:   1.  Normal left main coronary artery.   2.  Left anterior descending coronary artery has moderate diffuse narrowing of  about 50% in the distal segment.   3.  Left circumflex coronary artery appears angiographically normal.   4.  Right coronary artery is dominant for posterior circulation with minimal  plaquing disease.   5.  LV chamber dilatation with  severely depressed global LV systolic function  with an estimated LV ejection fraction of about 30-35% with elevated LV  end-diastolic pressures.      RECOMMENDATIONS: In view of nonfocal and nonsignificant degree of  atherosclerotic disease, it appears that his cardiomyopathy is nonischemic in  etiology. Hence, would continue to optimize therapy with ACE inhibitors, beta  blockers, Spironolactone, digoxin, and diuretics as needed.  We will continue  to monitor his left ventricular function and perhaps consider implantable  cardioverter-defibrillator implantation in the future if he continues to have  significantly depressed left ventricular systolic function with a left  ventricular ejection fraction of less than 35%.               Past Medical History:   Diagnosis Date   • Combined systolic and diastolic congestive heart failure (HCC)    • Diabetes mellitus (HCC)    • Hyperlipidemia    • Hypertension      Past Surgical History:   Procedure Laterality Date   • CARDIAC CATHETERIZATION      no stents     Family History   Problem Relation Age of Onset   • Diabetes Mother    • Diabetes Father      Social History     Tobacco Use   • Smoking status: Never Smoker   • Smokeless tobacco: Never Used   Substance Use Topics   • Alcohol use: No   • Drug use: No     Medications Prior to Admission   Medication Sig Dispense Refill Last Dose   • carvedilol (COREG) 6.25 MG tablet Take 6.25 mg by mouth 2 (Two) Times a Day With Meals.   10/5/2021 at am   • glipizide (GLUCOTROL) 10 MG tablet Take 10 mg by mouth 2 (Two) Times a Day Before Meals.   10/5/2021 at am   • guaiFENesin (MUCINEX) 600 MG 12 hr tablet Take 1,200 mg by mouth 2 (Two) Times a Day.   10/5/2021 at Unknown time   • losartan (COZAAR) 50 MG tablet Take 50 mg by mouth Daily.   10/5/2021 at am   • metFORMIN (GLUCOPHAGE) 1000 MG tablet Take 1,000 mg by mouth 2 (Two) Times a Day With Meals.   10/5/2021 at am   • pravastatin (PRAVACHOL) 10 MG tablet Take 10 mg by mouth Daily.    10/5/2021 at am     Allergies:  Patient has no known allergies.    Review of Systems   Constitutional: Negative for fatigue.   Eyes: Positive for visual disturbance.   Respiratory: Positive for shortness of breath.    Cardiovascular: Positive for chest pain (pressure) and palpitations. Negative for leg swelling.   Gastrointestinal: Negative for blood in stool.   Neurological: Positive for headaches. Negative for dizziness, syncope, weakness and light-headedness.   Hematological: Does not bruise/bleed easily.   All other systems reviewed and are negative.        Objective      Vital Signs  Temp:  [97.6 °F (36.4 °C)-98.2 °F (36.8 °C)] 97.8 °F (36.6 °C)  Heart Rate:  [] 96  Resp:  [15-20] 18  BP: (113-135)/(70-88) 135/87  Body mass index is 35.71 kg/m².    Intake/Output Summary (Last 24 hours) at 10/6/2021 1020  Last data filed at 10/5/2021 1900  Gross per 24 hour   Intake 460 ml   Output 800 ml   Net -340 ml       Physical Exam  Constitutional:       Appearance: He is well-developed. He is obese.   HENT:      Head: Normocephalic and atraumatic.   Eyes:      Pupils: Pupils are equal, round, and reactive to light.   Neck:      Vascular: No JVD.   Cardiovascular:      Rate and Rhythm: Normal rate and regular rhythm.      Heart sounds: No murmur heard.   No friction rub. No gallop.    Pulmonary:      Effort: Pulmonary effort is normal. No respiratory distress.      Breath sounds: Normal breath sounds. No wheezing or rales.   Abdominal:      Palpations: Abdomen is soft. There is no mass.      Tenderness: There is no abdominal tenderness.      Hernia: No hernia is present.   Skin:     General: Skin is warm and dry.   Neurological:      Mental Status: He is alert and oriented to person, place, and time.   Psychiatric:         Mood and Affect: Mood normal.         Behavior: Behavior normal.         Telemetry: Sinus 90s and 100s    Results Review:   I reviewed the patient's new clinical results.  Results from last 7  days   Lab Units 10/06/21  0444 10/05/21  1304 10/05/21  1104   TROPONIN T ng/mL <0.010 <0.010 <0.010     Results from last 7 days   Lab Units 10/05/21  1104   WBC 10*3/mm3 11.56*   HEMOGLOBIN g/dL 13.7   PLATELETS 10*3/mm3 332     Results from last 7 days   Lab Units 10/05/21  1104   SODIUM mmol/L 129*   POTASSIUM mmol/L 4.7   CHLORIDE mmol/L 95*   CO2 mmol/L 25.2   BUN mg/dL 23*   CREATININE mg/dL 0.79   CALCIUM mg/dL 9.6   GLUCOSE mg/dL 315*   ALT (SGPT) U/L 17   AST (SGOT) U/L 12     Lab Results   Component Value Date    INR 0.90 10/05/2021    INR 0.90 12/19/2018     Lab Results   Component Value Date    MG 1.5 (L) 10/05/2021     Lab Results   Component Value Date    TSH 0.811 12/20/2018    CHLPL 282 (H) 06/19/2014    TRIG 132 10/06/2021    HDL 34 (L) 10/06/2021    LDL 71 10/06/2021      Lab Results   Component Value Date    .0 (H) 12/20/2018    .6 (H) 12/19/2018    .0 (H) 01/08/2017        EKG:         Imaging Results (Last 72 Hours)     Procedure Component Value Units Date/Time    CT Chest Without Contrast Diagnostic [[id number]] Collected: 10/06/21 0904     Updated: 10/06/21 0910    Narrative:      EXAM:    CT Chest Without Intravenous Contrast     EXAM DATE:    10/6/2021 7:41 AM     CLINICAL HISTORY:    Cough, persistent     TECHNIQUE:    Axial computed tomography images of the chest without intravenous  contrast.  Sagittal and coronal reformatted images were created and  reviewed.  This CT exam was performed using one or more of the following  dose reduction techniques:  automated exposure control, adjustment of  the mA and/or kV according to patient size, and/or use of iterative  reconstruction technique.     COMPARISON:    01/08/2017     FINDINGS:    Lungs:  Right lung apex heterogeneous mixed density opacity is noted  that is approximately 33.2 x 17.7 mm and appears to BE along a  centrilobular distribution.  There are additional bilateral groundglass  nodular opacities and semisolid  nodular opacities in both lungs basilar  predominant also in a centrilobular distribution and most compatible  with atypical pneumonia and would include COVID pneumonia within the  differential.  Interstitial thickening is present compatible with  interstitial edema in the setting of CHF.  No lobar consolidations  identified.  Airways are patent.    Pleural space:  Miniscule pleural effusions which are nonloculated.   No pneumothorax.    Heart:  Moderate cardiomegaly is noted.  Moderate coronary artery  calcifications.  No significant pericardial effusion.    Mediastinum:  Reactive appearing hilar and mediastinal lymph nodes  largest of which is in the right hilum and is 1.8 cm.    Bones/joints:  Unremarkable.  No acute fracture.  No dislocation.    Soft tissues:  Unremarkable.    Vasculature:  Vascular congestion is noted.  No thoracic aortic  aneurysm.    Lymph nodes:  Unremarkable.  No enlarged lymph nodes.    Liver:  Diffuse fatty infiltration of liver.       Impression:      1.  Bilateral centrilobular nodular opacities as described above both  semisolid and groundglass in nature in a pattern that favors atypical  pneumonia. Please note, COVID pneumonia is within the differential.  2.  More focal conglomerative opacity in the right lung apex also likely  pneumonia. Follow-up after treatment to confirm resolution/improvement.  3.  Cardiomegaly with trace effusions and interstitial thickening with  pulmonary vascular congestion indicative of changes of CHF.  4.  Fatty liver.  5.  Other nonacute findings as above.     This report was finalized on 10/6/2021 9:08 AM by Dr. Taj Solares MD.       CT Angiogram Neck [[id number]] Collected: 10/05/21 1702     Updated: 10/05/21 1707    Narrative:      EXAM:    CT Angiography Neck With Intravenous Contrast     EXAM DATE:    10/5/2021 4:28 PM     CLINICAL HISTORY:    stroke     TECHNIQUE:    Axial computed tomographic angiography images of the neck with  intravenous  contrast. LVO analysis was performed with [email or web address] software.   This CT exam was performed using one or more of the following dose  reduction techniques:  automated exposure control, adjustment of the mA  and/or kV according to patient size, and/or use of iterative  reconstruction technique.    MIP reconstructed images were created and reviewed.     COMPARISON:    No relevant prior studies available.     FINDINGS:      VASCULATURE:    Right common carotid artery:  Mild interval thickening of right CCA.   No significant stenosis.  No dissection or occlusion.    Right internal carotid artery:  Mild calcified and noncalcified plaque  of the proximal right ICA and right carotid bulb. No significant  stenosis or occlusion.    Right external carotid artery:  Unremarkable.  No occlusion.    Right vertebral artery:  Unremarkable.  No significant stenosis.  No  dissection or occlusion.       Left common carotid artery:  Mild intimal thickening left CCA.  No  significant stenosis.  No dissection or occlusion.    Left internal carotid artery:  Mild calcified plaque left proximal ICA  and left carotid bulb. No significant stenosis or occlusion.    Left external carotid artery:  Unremarkable.  No occlusion.    Left vertebral artery:  Left vertebral artery dominance is noted.  No  significant stenosis.  No dissection or occlusion.    Other vasculature:  Three-vessel arch configuration is noted.      NECK:    Bones/joints:  Multilevel degenerative changes cervical spine with  mild neural foraminal stenosis lower levels.  No acute fracture.  No  dislocation.    Soft tissues:  No enhancing soft tissue masses.    Lymph nodes:  Reactive appearing cervical lymph nodes. No adenopathy.    Lung apices:  Nodular opacities of the right upper lobe most  consistent with pneumonia. Largest opacity in the right lung apex is  21.8 mm and CT follow-up is recommended after treatment. These appear to  be in a centrilobular distribution. Faint nodular  opacities of the left  upper lobe are also present.      CAROTID STENOSIS REFERENCE USING NASCET CRITERIA:    % ICA stenosis = (1 - narrowest ICA diameter/diameter of distal  cervical ICA) x 100.    Mild - <50% stenosis.    Moderate - 50-69% stenosis.    Severe - 70-94% stenosis.    Near occlusion - 95-99% stenosis.    Occluded - 100% stenosis.       Impression:      1.  Mild plaque of the right greater than left carotid systems. No  occlusion or hemodynamically significant stenosis.  2.  Vertebrobasilar circulation appears within normal limits.  3.  Patchy opacities and centrilobular nodules of the right greater than  left upper lobes in a pattern and distribution most consistent with  pneumonia. Correlate with clinical history.  4.  Other nonacute findings as above.     This report was finalized on 10/5/2021 5:05 PM by Dr. Taj Solares MD.       CT Angiogram Head [[id number]] Collected: 10/05/21 1700     Updated: 10/05/21 1705    Narrative:      EXAM:    CT Angiography Head With Intravenous Contrast     EXAM DATE:    10/5/2021 4:27 PM     CLINICAL HISTORY:    stroke     TECHNIQUE:    Axial computed tomographic angiography images of the head with  intravenous contrast. LVO analysis was performed with [employer] software.   This CT exam was performed using one or more of the following dose  reduction techniques:  automated exposure control, adjustment of the mA  and/or kV according to patient size, and/or use of iterative  reconstruction technique.    MIP reconstructed images were created and reviewed.     COMPARISON:    No relevant prior studies available.     FINDINGS:    Right internal carotid artery:  Mild calcifications of the right ICA  intracranial segments. No significant stenosis or occlusion.  No  aneurysm.    Right anterior cerebral artery:  See below.      Right middle cerebral artery:  Unremarkable.  No occlusion or  significant stenosis.  No aneurysm.    Right posterior cerebral artery:  Unremarkable.  No  occlusion or  significant stenosis.  No aneurysm.    Right vertebral artery:  Unremarkable as visualized.       Left internal carotid artery:  Mild calcifications of the left  intracranial ICA segments. No significant stenosis or occlusion.  No  aneurysm.    Left anterior cerebral artery:  Normal variant anatomy of the left LISA  vascular system with diminutive left A2 LISA segments which appears to be  predominantly supplied by the right A2 LISA segments.  No occlusion or  significant stenosis.  No aneurysm.    Left middle cerebral artery:  Unremarkable.  No occlusion or  significant stenosis.  No aneurysm.    Left posterior cerebral artery:  Unremarkable.  No occlusion or  significant stenosis.  No aneurysm.    Left vertebral artery:  Mild left vertebral artery dominance is noted.       Basilar artery:  The basilar artery is unremarkable.  No occlusion or  significant stenosis.  No aneurysm.    Other findings:  Basilar apex is unremarkable.       Impression:      1.  Unremarkable CTA of the brain demonstrating no large vessel  occlusion. Normal congenital anatomic variants are incidentally noted.  2.  Hypodensity right occipital lobe region compatible with patient's  known infarct.     This report was finalized on 10/5/2021 5:02 PM by Dr. Taj Solares MD.       MRI Brain With & Without Contrast [[id number]] Collected: 10/05/21 1535     Updated: 10/05/21 1539    Narrative:      EXAM:    MR Head Without and With Intravenous Contrast     EXAM DATE:    10/5/2021 2:47 PM     CLINICAL HISTORY:    HA, abnormal CT     TECHNIQUE:    Magnetic resonance images of the head/brain without and with  intravenous contrast in multiple planes.     COMPARISON:    CT same day     FINDINGS:    Brain:  Corresponding to CT abnormality in the right occipital lobe,  restricted diffusion is noted with correspondingly low ADC signal  compatible with acute ischemic infarct. No MRI evidence of hemorrhage.   Chronic small vessel ischemic disease  is noted.  Chronic appearing  subcortical infarct left frontal lobe.  Normal age appropriate brain  volume is noted.    Midline shift:  No midline shift.    Ventricles:  No hydrocephalus.    Bones/joints:  Unremarkable.    Sinuses:  Unremarkable as visualized.  No acute sinusitis.    Mastoid air cells:  Unremarkable as visualized.  No mastoid effusion.    Orbits:  Unremarkable as visualized.       Impression:        Right occipital region acute infarct.     This report was finalized on 10/5/2021 3:37 PM by Dr. Taj Solares MD.       CT Head Without Contrast [[id number]] Collected: 10/05/21 1419     Updated: 10/05/21 1422    Narrative:      CT HEAD WO CONTRAST-     CLINICAL INDICATION: severe HA        COMPARISON: None available      TECHNIQUE: Axial images of the brain were obtained with out intravenous  contrast.  Reformatted images were created in the sagittal and coronal  planes.     DOSE:     Radiation dose reduction techniques were utilized per ALARA protocol.  Automated exposure control was initiated through either or [employer] or  [employer] software packages by  protocol.           FINDINGS:   Vague low-attenuation in the right occipital region. This could  represent an area of ischemia. Age-indeterminate. Recommend clinical  correlation. MRI may be necessary  The ventricles, cisterns, and sulci are unremarkable. There is no  hydrocephalus.      I do not see epidural or subdural hematoma.     The bone window setting images show no destructive calvarial lesion or  acute calvarial fracture.   The posterior fossa is unremarkable.          Impression:         1. Vague area of decreased attenuation in the right occipital region  could represent focus of ischemia. Recommend clinical correlation. If  suspicion is high, then consider MRI  2. No focal parenchymal hemorrhage or midline shift     This report was finalized on 10/5/2021 2:20 PM by Dr. Raji Burnham MD.       XR Chest 1 View [[id number]]  Collected: 10/05/21 1246     Updated: 10/05/21 1300    Narrative:      EXAM:    XR Chest, 1 View     EXAM DATE:    10/5/2021 12:22 PM     CLINICAL HISTORY:    chest pain     TECHNIQUE:    Frontal view of the chest.     COMPARISON:    12/19/2018     FINDINGS:    Lungs:  Unremarkable.  No consolidation.    Pleural space:  Unremarkable.  No pneumothorax.    Heart:  Unremarkable.  No cardiomegaly.    Mediastinum:  Unremarkable.    Bones/joints:  Unremarkable.       Impression:        Stable unremarkable chest.     This report was finalized on 10/5/2021 12:46 PM by Dr. Taj Solares MD.                Thank you very much for asking us to be involved in this patient's care.  We will follow along with you.    ROSA MARIA Metz   10/06/21  10:20 EDT

## 2021-10-06 NOTE — PLAN OF CARE
Pt w/ somewhat bilateral peripheral loss. No other s/s of distress noted. Pt w/ desirable UO throughout the night. Mg replaced.

## 2021-10-06 NOTE — CONSULTS
Inpatient Cardiology Consult  Consult performed by: Nayeli Yun APRN  Consult ordered by: Liv Bailey PA-C        Date of Admit: 10/5/2021  Date of Consult: 10/06/21  Provider, No Known  Cas Mccabe  1965    Consulting Physician: Fabian Arnold MD    Cardiology consultation    Reason for consultation: Chest pain, EF 30%    Assessment:  1. Chest pain in the setting of known nonobstructive coronary artery disease, troponins have been negative x3 and no acute ischemic changes noted on EKG.  2. Nonischemic cardiomyopathy  3. Nonobstructive coronary artery disease per cardiac catheterization of 2/18/2014.  Findings include 1 vessel disease with a 50% stenosis of the LAD in the distal segment.  LVEF noted to be between 30 and 35%.  4. Acute right occipital infarct  5. Hypertension  6. Dyslipidemia  7. Diabetes, not controlled.  8. Obesity      Recommendations:  1. We would recommend proceeding with a nuclear stress test.  We will plan for the a.m.  Patient n.p.o. after midnight.  Patient to continue aspirin, atorvastatin, and carvedilol.  2. With regard to cardiomyopathy, we will start patient on Entresto and Aldactone.  Continue carvedilol.  Importance of these medications have been discussed with the patient.  3. Due to patient's admitting a diagnosis of acute right occipital infarct and he is symptom of palpitations, we would recommend placement of event monitor at discharge.  Thus far, patient has not had any arrhythmia during this hospitalization.  4. Patient counseled regarding blood sugar control in the presence of known coronary artery disease.      History of Present Illness    Subjective     Chief Complaint   Patient presents with   • Chest Pain       Cas Mccabe is a 56 y.o. male with past medical history significant for nonobstructive coronary artery disease per cardiac catheterization of 2/18/2014, nonischemic cardiomyopathy, hyperlipidemia, hypertension, diabetes, and obesity.   The patient presented to the ED on 10/5/2021 with complaint of chest pain, headache and dyspnea.  Patient also reported some visual changes.  He was diagnosed with an acute right occipital infarct and admitted for further evaluation and management.  Cardiology was consulted for patient complaint of chest pain.    Patient was seen and examined.  He reports that he has had pressure in his chest for at least a week.  He states that the onset is usually in the afternoon when he is working.  He reports he came to the hospital due to ongoing headache which is unusual for him.  He also states that he noted on his way to the hospital that he was having some blurred vision.  Patient reports occasional palpitations or sensation that his heart is beating fast.    Patient has a history of cardiomyopathy, first diagnosed in 2014.  He states that at some point he stopped taking his medications and had a echocardiogram.  After the echocardiogram he was placed back on his medications.  He has not seen a cardiologist since his initial diagnosis in 2014.    Cardiac risk factors:diabetes mellitus, hypercholesterolemia, hypertension and Obesity    Last Echo: 12/20/2018    Interpretation Summary    · The left ventricular cavity is moderate-to-severely dilated.  · Left ventricular diastolic dysfunction (grade II) consistent with pseudonormalization.  · Calculated EF = 30%. Estimated EF was in agreement with the calculated EF. Normal left ventricular wall thickness noted.         Last Stress:     Last Cath: 2/18/2014    Conclusion    [place], [place] [address]  [phone]     NAME:                    SLOAN FITZGERALD  ROOM NUMBER:             [id number]  MEDICAL RECORD NUMBER:   [id number]  PHYSICIAN:                    Cory Holden MD, [facility]  PRIMARY CARE PROVIDER:     DATE OF PROCEDURE:       02/18/2014     YOB: 1965     REFERRING PHYSICIAN:  Dr. Girish Joel  Gibran.     HISTORY OF PRESENT ILLNESS: The patient is a 48-year-old  male who was  recently diagnosed to have an advanced dilated cardiomyopathy. He has no  obvious etiology noted for his cardiomyopathy. He has some risk factors for  coronary artery disease. Hence, he was given the option of further cardiac  evaluation with cardiac catheterization/coronary arteriography to rule out any  underlying significant coronary artery disease that might be causing his  cardiomyopathy and to help with the decision regarding further management. The  procedure of cardiac catheterization and potential risks and complications, and  potential benefits and alternative methods of management have been explained to  the patient and he expressed understanding of the same and is wanting to  proceed. Informed consent was obtained. A timeout was performed prior to  starting the procedure to identify the right patient and the right procedure.      II. OPERATIVE REPORT:   PROCEDURE: Left heart catheterization with selective coronary arteriography and  left ventricular angiography.      TECHNIQUE: Percutaneous access of the right femoral artery by Seldinger's  technique. Coronary arteriography was done by Jag technique.      CATHETERS: A 5-Mexican sheath in the right femoral artery, 5-Mexican Jag left  #4,   5 Mexican 3-D RC, and 5-Mexican angle pigtail catheters.      CONTRAST: Optiray total of about 85 mLs.      COMPLICATIONS: None. At the procedure, a Terence pad was used to obtain  adequate hemostasis. For further details of the procedure, please review the  record maintained by the monitor tech.       III. RESULTS:   A. HEMODYNAMICS.   PREANGIOGRAPHIC PRESSURE:   Aortic pressure 116/78 (94) mmHg.   Left ventricular pressure 117/20 mmHg.       POSTANGIOGRAPHIC PRESSURE:   Left ventricular pressure 116/15 mmHg.    Aortic pressure 122/77 (mean 96) mmHg.    B.  Angiograms:   1.  CORONARY ARTERIOGRAMS: On fluoroscopy, there was  no significant epicardial  calcification noted.      Left main coronary artery - is normal and bifurcates into medium-size left  anterior descending and left circumflex system in the usual fashion.      Left anterior descending coronary artery - gives rise to a medium-size diagonal  branch from the mid segment with overall mild plaquing disease noted. Distal  segment of the LAD has diffuse narrowing of about 50%.      Left circumflex coronary artery - gives rise to a very proximal obtuse marginal  branch that almost arises as a ramus medianus branch and appears  angiographically normal. The ongoing circumflex gives rise to a medium-size  obtuse marginal branch from the proximal segment, small posterolateral branches  from the mid segment with overall minimal plaquing disease noted.      Right coronary artery - is a medium caliber vessel and is dominant for  posterior circulation. It appears angiographically normal giving rise to a  medium to large size posterior descending artery branch and a smaller  posterolateral branch that has mild plaquing disease.      2.  LEFT VENTRICULAR ANGIOGRAM: Was done in 30' MAIRO projection. It reveals LV  chamber dilatation with severely depressed global LV systolic function with a  visually estimated LV ejection fraction of about 30-35%.      CONCLUSIONS AND COMMENTS: The patient is a 48-year-old  male who was  recently diagnosed to have advanced dilated cardiomyopathy. Is brought in for  elected coronary angiography to rule out any underlying significant coronary  artery disease causing his cardiomyopathy. This reveals:   1.  Normal left main coronary artery.   2.  Left anterior descending coronary artery has moderate diffuse narrowing of  about 50% in the distal segment.   3.  Left circumflex coronary artery appears angiographically normal.   4.  Right coronary artery is dominant for posterior circulation with minimal  plaquing disease.   5.  LV chamber dilatation with  severely depressed global LV systolic function  with an estimated LV ejection fraction of about 30-35% with elevated LV  end-diastolic pressures.      RECOMMENDATIONS: In view of nonfocal and nonsignificant degree of  atherosclerotic disease, it appears that his cardiomyopathy is nonischemic in  etiology. Hence, would continue to optimize therapy with ACE inhibitors, beta  blockers, Spironolactone, digoxin, and diuretics as needed.  We will continue  to monitor his left ventricular function and perhaps consider implantable  cardioverter-defibrillator implantation in the future if he continues to have  significantly depressed left ventricular systolic function with a left  ventricular ejection fraction of less than 35%.               Past Medical History:   Diagnosis Date   • Combined systolic and diastolic congestive heart failure (HCC)    • Diabetes mellitus (HCC)    • Hyperlipidemia    • Hypertension      Past Surgical History:   Procedure Laterality Date   • CARDIAC CATHETERIZATION      no stents     Family History   Problem Relation Age of Onset   • Diabetes Mother    • Diabetes Father      Social History     Tobacco Use   • Smoking status: Never Smoker   • Smokeless tobacco: Never Used   Substance Use Topics   • Alcohol use: No   • Drug use: No     Medications Prior to Admission   Medication Sig Dispense Refill Last Dose   • carvedilol (COREG) 6.25 MG tablet Take 6.25 mg by mouth 2 (Two) Times a Day With Meals.   10/5/2021 at am   • glipizide (GLUCOTROL) 10 MG tablet Take 10 mg by mouth 2 (Two) Times a Day Before Meals.   10/5/2021 at am   • guaiFENesin (MUCINEX) 600 MG 12 hr tablet Take 1,200 mg by mouth 2 (Two) Times a Day.   10/5/2021 at Unknown time   • losartan (COZAAR) 50 MG tablet Take 50 mg by mouth Daily.   10/5/2021 at am   • metFORMIN (GLUCOPHAGE) 1000 MG tablet Take 1,000 mg by mouth 2 (Two) Times a Day With Meals.   10/5/2021 at am   • pravastatin (PRAVACHOL) 10 MG tablet Take 10 mg by mouth Daily.    10/5/2021 at am     Allergies:  Patient has no known allergies.    Review of Systems   Constitutional: Negative for fatigue.   Eyes: Positive for visual disturbance.   Respiratory: Positive for shortness of breath.    Cardiovascular: Positive for chest pain (pressure) and palpitations. Negative for leg swelling.   Gastrointestinal: Negative for blood in stool.   Neurological: Positive for headaches. Negative for dizziness, syncope, weakness and light-headedness.   Hematological: Does not bruise/bleed easily.   All other systems reviewed and are negative.        Objective      Vital Signs  Temp:  [97.6 °F (36.4 °C)-98.2 °F (36.8 °C)] 97.8 °F (36.6 °C)  Heart Rate:  [] 96  Resp:  [15-20] 18  BP: (113-135)/(70-88) 135/87  Body mass index is 35.71 kg/m².    Intake/Output Summary (Last 24 hours) at 10/6/2021 1020  Last data filed at 10/5/2021 1900  Gross per 24 hour   Intake 460 ml   Output 800 ml   Net -340 ml       Physical Exam  Constitutional:       Appearance: He is well-developed. He is obese.   HENT:      Head: Normocephalic and atraumatic.   Eyes:      Pupils: Pupils are equal, round, and reactive to light.   Neck:      Vascular: No JVD.   Cardiovascular:      Rate and Rhythm: Normal rate and regular rhythm.      Heart sounds: No murmur heard.   No friction rub. No gallop.    Pulmonary:      Effort: Pulmonary effort is normal. No respiratory distress.      Breath sounds: Normal breath sounds. No wheezing or rales.   Abdominal:      Palpations: Abdomen is soft. There is no mass.      Tenderness: There is no abdominal tenderness.      Hernia: No hernia is present.   Skin:     General: Skin is warm and dry.   Neurological:      Mental Status: He is alert and oriented to person, place, and time.   Psychiatric:         Mood and Affect: Mood normal.         Behavior: Behavior normal.         Telemetry: Sinus 90s and 100s    Results Review:   I reviewed the patient's new clinical results.  Results from last 7  days   Lab Units 10/06/21  0444 10/05/21  1304 10/05/21  1104   TROPONIN T ng/mL <0.010 <0.010 <0.010     Results from last 7 days   Lab Units 10/05/21  1104   WBC 10*3/mm3 11.56*   HEMOGLOBIN g/dL 13.7   PLATELETS 10*3/mm3 332     Results from last 7 days   Lab Units 10/05/21  1104   SODIUM mmol/L 129*   POTASSIUM mmol/L 4.7   CHLORIDE mmol/L 95*   CO2 mmol/L 25.2   BUN mg/dL 23*   CREATININE mg/dL 0.79   CALCIUM mg/dL 9.6   GLUCOSE mg/dL 315*   ALT (SGPT) U/L 17   AST (SGOT) U/L 12     Lab Results   Component Value Date    INR 0.90 10/05/2021    INR 0.90 12/19/2018     Lab Results   Component Value Date    MG 1.5 (L) 10/05/2021     Lab Results   Component Value Date    TSH 0.811 12/20/2018    CHLPL 282 (H) 06/19/2014    TRIG 132 10/06/2021    HDL 34 (L) 10/06/2021    LDL 71 10/06/2021      Lab Results   Component Value Date    .0 (H) 12/20/2018    .6 (H) 12/19/2018    .0 (H) 01/08/2017        EKG:         Imaging Results (Last 72 Hours)     Procedure Component Value Units Date/Time    CT Chest Without Contrast Diagnostic [[id number]] Collected: 10/06/21 0904     Updated: 10/06/21 0910    Narrative:      EXAM:    CT Chest Without Intravenous Contrast     EXAM DATE:    10/6/2021 7:41 AM     CLINICAL HISTORY:    Cough, persistent     TECHNIQUE:    Axial computed tomography images of the chest without intravenous  contrast.  Sagittal and coronal reformatted images were created and  reviewed.  This CT exam was performed using one or more of the following  dose reduction techniques:  automated exposure control, adjustment of  the mA and/or kV according to patient size, and/or use of iterative  reconstruction technique.     COMPARISON:    01/08/2017     FINDINGS:    Lungs:  Right lung apex heterogeneous mixed density opacity is noted  that is approximately 33.2 x 17.7 mm and appears to BE along a  centrilobular distribution.  There are additional bilateral groundglass  nodular opacities and semisolid  nodular opacities in both lungs basilar  predominant also in a centrilobular distribution and most compatible  with atypical pneumonia and would include COVID pneumonia within the  differential.  Interstitial thickening is present compatible with  interstitial edema in the setting of CHF.  No lobar consolidations  identified.  Airways are patent.    Pleural space:  Miniscule pleural effusions which are nonloculated.   No pneumothorax.    Heart:  Moderate cardiomegaly is noted.  Moderate coronary artery  calcifications.  No significant pericardial effusion.    Mediastinum:  Reactive appearing hilar and mediastinal lymph nodes  largest of which is in the right hilum and is 1.8 cm.    Bones/joints:  Unremarkable.  No acute fracture.  No dislocation.    Soft tissues:  Unremarkable.    Vasculature:  Vascular congestion is noted.  No thoracic aortic  aneurysm.    Lymph nodes:  Unremarkable.  No enlarged lymph nodes.    Liver:  Diffuse fatty infiltration of liver.       Impression:      1.  Bilateral centrilobular nodular opacities as described above both  semisolid and groundglass in nature in a pattern that favors atypical  pneumonia. Please note, COVID pneumonia is within the differential.  2.  More focal conglomerative opacity in the right lung apex also likely  pneumonia. Follow-up after treatment to confirm resolution/improvement.  3.  Cardiomegaly with trace effusions and interstitial thickening with  pulmonary vascular congestion indicative of changes of CHF.  4.  Fatty liver.  5.  Other nonacute findings as above.     This report was finalized on 10/6/2021 9:08 AM by Dr. Taj Solares MD.       CT Angiogram Neck [[id number]] Collected: 10/05/21 1702     Updated: 10/05/21 1707    Narrative:      EXAM:    CT Angiography Neck With Intravenous Contrast     EXAM DATE:    10/5/2021 4:28 PM     CLINICAL HISTORY:    stroke     TECHNIQUE:    Axial computed tomographic angiography images of the neck with  intravenous  contrast. LVO analysis was performed with tribr.AI software.   This CT exam was performed using one or more of the following dose  reduction techniques:  automated exposure control, adjustment of the mA  and/or kV according to patient size, and/or use of iterative  reconstruction technique.    MIP reconstructed images were created and reviewed.     COMPARISON:    No relevant prior studies available.     FINDINGS:      VASCULATURE:    Right common carotid artery:  Mild interval thickening of right CCA.   No significant stenosis.  No dissection or occlusion.    Right internal carotid artery:  Mild calcified and noncalcified plaque  of the proximal right ICA and right carotid bulb. No significant  stenosis or occlusion.    Right external carotid artery:  Unremarkable.  No occlusion.    Right vertebral artery:  Unremarkable.  No significant stenosis.  No  dissection or occlusion.       Left common carotid artery:  Mild intimal thickening left CCA.  No  significant stenosis.  No dissection or occlusion.    Left internal carotid artery:  Mild calcified plaque left proximal ICA  and left carotid bulb. No significant stenosis or occlusion.    Left external carotid artery:  Unremarkable.  No occlusion.    Left vertebral artery:  Left vertebral artery dominance is noted.  No  significant stenosis.  No dissection or occlusion.    Other vasculature:  Three-vessel arch configuration is noted.      NECK:    Bones/joints:  Multilevel degenerative changes cervical spine with  mild neural foraminal stenosis lower levels.  No acute fracture.  No  dislocation.    Soft tissues:  No enhancing soft tissue masses.    Lymph nodes:  Reactive appearing cervical lymph nodes. No adenopathy.    Lung apices:  Nodular opacities of the right upper lobe most  consistent with pneumonia. Largest opacity in the right lung apex is  21.8 mm and CT follow-up is recommended after treatment. These appear to  be in a centrilobular distribution. Faint nodular  opacities of the left  upper lobe are also present.      CAROTID STENOSIS REFERENCE USING NASCET CRITERIA:    % ICA stenosis = (1 - narrowest ICA diameter/diameter of distal  cervical ICA) x 100.    Mild - <50% stenosis.    Moderate - 50-69% stenosis.    Severe - 70-94% stenosis.    Near occlusion - 95-99% stenosis.    Occluded - 100% stenosis.       Impression:      1.  Mild plaque of the right greater than left carotid systems. No  occlusion or hemodynamically significant stenosis.  2.  Vertebrobasilar circulation appears within normal limits.  3.  Patchy opacities and centrilobular nodules of the right greater than  left upper lobes in a pattern and distribution most consistent with  pneumonia. Correlate with clinical history.  4.  Other nonacute findings as above.     This report was finalized on 10/5/2021 5:05 PM by Dr. Taj Solares MD.       CT Angiogram Head [[id number]] Collected: 10/05/21 1700     Updated: 10/05/21 1705    Narrative:      EXAM:    CT Angiography Head With Intravenous Contrast     EXAM DATE:    10/5/2021 4:27 PM     CLINICAL HISTORY:    stroke     TECHNIQUE:    Axial computed tomographic angiography images of the head with  intravenous contrast. LVO analysis was performed with Optimal+.Synchronized software.   This CT exam was performed using one or more of the following dose  reduction techniques:  automated exposure control, adjustment of the mA  and/or kV according to patient size, and/or use of iterative  reconstruction technique.    MIP reconstructed images were created and reviewed.     COMPARISON:    No relevant prior studies available.     FINDINGS:    Right internal carotid artery:  Mild calcifications of the right ICA  intracranial segments. No significant stenosis or occlusion.  No  aneurysm.    Right anterior cerebral artery:  See below.      Right middle cerebral artery:  Unremarkable.  No occlusion or  significant stenosis.  No aneurysm.    Right posterior cerebral artery:  Unremarkable.  No  occlusion or  significant stenosis.  No aneurysm.    Right vertebral artery:  Unremarkable as visualized.       Left internal carotid artery:  Mild calcifications of the left  intracranial ICA segments. No significant stenosis or occlusion.  No  aneurysm.    Left anterior cerebral artery:  Normal variant anatomy of the left LISA  vascular system with diminutive left A2 LISA segments which appears to be  predominantly supplied by the right A2 LISA segments.  No occlusion or  significant stenosis.  No aneurysm.    Left middle cerebral artery:  Unremarkable.  No occlusion or  significant stenosis.  No aneurysm.    Left posterior cerebral artery:  Unremarkable.  No occlusion or  significant stenosis.  No aneurysm.    Left vertebral artery:  Mild left vertebral artery dominance is noted.       Basilar artery:  The basilar artery is unremarkable.  No occlusion or  significant stenosis.  No aneurysm.    Other findings:  Basilar apex is unremarkable.       Impression:      1.  Unremarkable CTA of the brain demonstrating no large vessel  occlusion. Normal congenital anatomic variants are incidentally noted.  2.  Hypodensity right occipital lobe region compatible with patient's  known infarct.     This report was finalized on 10/5/2021 5:02 PM by Dr. Taj Solares MD.       MRI Brain With & Without Contrast [[id number]] Collected: 10/05/21 1535     Updated: 10/05/21 1539    Narrative:      EXAM:    MR Head Without and With Intravenous Contrast     EXAM DATE:    10/5/2021 2:47 PM     CLINICAL HISTORY:    HA, abnormal CT     TECHNIQUE:    Magnetic resonance images of the head/brain without and with  intravenous contrast in multiple planes.     COMPARISON:    CT same day     FINDINGS:    Brain:  Corresponding to CT abnormality in the right occipital lobe,  restricted diffusion is noted with correspondingly low ADC signal  compatible with acute ischemic infarct. No MRI evidence of hemorrhage.   Chronic small vessel ischemic disease  is noted.  Chronic appearing  subcortical infarct left frontal lobe.  Normal age appropriate brain  volume is noted.    Midline shift:  No midline shift.    Ventricles:  No hydrocephalus.    Bones/joints:  Unremarkable.    Sinuses:  Unremarkable as visualized.  No acute sinusitis.    Mastoid air cells:  Unremarkable as visualized.  No mastoid effusion.    Orbits:  Unremarkable as visualized.       Impression:        Right occipital region acute infarct.     This report was finalized on 10/5/2021 3:37 PM by Dr. Taj Solares MD.       CT Head Without Contrast [[id number]] Collected: 10/05/21 1419     Updated: 10/05/21 1422    Narrative:      CT HEAD WO CONTRAST-     CLINICAL INDICATION: severe HA        COMPARISON: None available      TECHNIQUE: Axial images of the brain were obtained with out intravenous  contrast.  Reformatted images were created in the sagittal and coronal  planes.     DOSE:     Radiation dose reduction techniques were utilized per ALARA protocol.  Automated exposure control was initiated through either or [employer] or  [employer] software packages by  protocol.           FINDINGS:   Vague low-attenuation in the right occipital region. This could  represent an area of ischemia. Age-indeterminate. Recommend clinical  correlation. MRI may be necessary  The ventricles, cisterns, and sulci are unremarkable. There is no  hydrocephalus.      I do not see epidural or subdural hematoma.     The bone window setting images show no destructive calvarial lesion or  acute calvarial fracture.   The posterior fossa is unremarkable.          Impression:         1. Vague area of decreased attenuation in the right occipital region  could represent focus of ischemia. Recommend clinical correlation. If  suspicion is high, then consider MRI  2. No focal parenchymal hemorrhage or midline shift     This report was finalized on 10/5/2021 2:20 PM by Dr. Raji Burnham MD.       XR Chest 1 View [[id number]]  Collected: 10/05/21 1246     Updated: 10/05/21 1300    Narrative:      EXAM:    XR Chest, 1 View     EXAM DATE:    10/5/2021 12:22 PM     CLINICAL HISTORY:    chest pain     TECHNIQUE:    Frontal view of the chest.     COMPARISON:    12/19/2018     FINDINGS:    Lungs:  Unremarkable.  No consolidation.    Pleural space:  Unremarkable.  No pneumothorax.    Heart:  Unremarkable.  No cardiomegaly.    Mediastinum:  Unremarkable.    Bones/joints:  Unremarkable.       Impression:        Stable unremarkable chest.     This report was finalized on 10/5/2021 12:46 PM by Dr. Taj Solares MD.                Thank you very much for asking us to be involved in this patient's care.  We will follow along with you.    ROSA MARIA Metz   10/06/21  10:20 EDT

## 2021-10-07 ENCOUNTER — APPOINTMENT (OUTPATIENT)
Dept: NUCLEAR MEDICINE | Facility: HOSPITAL | Age: 56
End: 2021-10-07

## 2021-10-07 ENCOUNTER — APPOINTMENT (OUTPATIENT)
Dept: CARDIOLOGY | Facility: HOSPITAL | Age: 56
End: 2021-10-07

## 2021-10-07 VITALS
WEIGHT: 241.6 LBS | OXYGEN SATURATION: 97 % | HEIGHT: 69 IN | DIASTOLIC BLOOD PRESSURE: 78 MMHG | BODY MASS INDEX: 35.78 KG/M2 | TEMPERATURE: 98.6 F | SYSTOLIC BLOOD PRESSURE: 135 MMHG | HEART RATE: 100 BPM | RESPIRATION RATE: 18 BRPM

## 2021-10-07 PROBLEM — A41.9 SEPSIS DUE TO PNEUMONIA (HCC): Status: RESOLVED | Noted: 2021-10-05 | Resolved: 2021-10-07

## 2021-10-07 PROBLEM — J18.9 SEPSIS DUE TO PNEUMONIA (HCC): Status: RESOLVED | Noted: 2021-10-05 | Resolved: 2021-10-07

## 2021-10-07 LAB
ANION GAP SERPL CALCULATED.3IONS-SCNC: 9.2 MMOL/L (ref 5–15)
BASOPHILS # BLD AUTO: 0.11 10*3/MM3 (ref 0–0.2)
BASOPHILS NFR BLD AUTO: 1 % (ref 0–1.5)
BH CV NUCLEAR PRIOR STUDY: 3
BH CV REST NUCLEAR ISOTOPE DOSE: 10.2 MCI
BH CV STRESS BP STAGE 1: NORMAL
BH CV STRESS BP STAGE 2: NORMAL
BH CV STRESS COMMENTS STAGE 1: NORMAL
BH CV STRESS COMMENTS STAGE 2: NORMAL
BH CV STRESS DOSE REGADENOSON STAGE 1: 0.4
BH CV STRESS DURATION MIN STAGE 1: 0
BH CV STRESS DURATION MIN STAGE 2: 4
BH CV STRESS DURATION SEC STAGE 1: 10
BH CV STRESS DURATION SEC STAGE 2: 0
BH CV STRESS HR STAGE 1: 102
BH CV STRESS HR STAGE 2: 102
BH CV STRESS NUCLEAR ISOTOPE DOSE: 30.1 MCI
BH CV STRESS PROTOCOL 1: NORMAL
BH CV STRESS RECOVERY BP: NORMAL MMHG
BH CV STRESS RECOVERY HR: 96 BPM
BH CV STRESS STAGE 1: 1
BH CV STRESS STAGE 2: 2
BUN SERPL-MCNC: 19 MG/DL (ref 6–20)
BUN/CREAT SERPL: 35.2 (ref 7–25)
CALCIUM SPEC-SCNC: 9 MG/DL (ref 8.6–10.5)
CHLORIDE SERPL-SCNC: 99 MMOL/L (ref 98–107)
CO2 SERPL-SCNC: 24.8 MMOL/L (ref 22–29)
CREAT SERPL-MCNC: 0.54 MG/DL (ref 0.76–1.27)
DEPRECATED RDW RBC AUTO: 38.1 FL (ref 37–54)
EOSINOPHIL # BLD AUTO: 0.68 10*3/MM3 (ref 0–0.4)
EOSINOPHIL NFR BLD AUTO: 6.1 % (ref 0.3–6.2)
ERYTHROCYTE [DISTWIDTH] IN BLOOD BY AUTOMATED COUNT: 11.9 % (ref 12.3–15.4)
GFR SERPL CREATININE-BSD FRML MDRD: >150 ML/MIN/1.73
GLUCOSE BLDC GLUCOMTR-MCNC: 219 MG/DL (ref 70–130)
GLUCOSE BLDC GLUCOMTR-MCNC: 240 MG/DL (ref 70–130)
GLUCOSE BLDC GLUCOMTR-MCNC: 336 MG/DL (ref 70–130)
GLUCOSE BLDC GLUCOMTR-MCNC: 337 MG/DL (ref 70–130)
GLUCOSE BLDC GLUCOMTR-MCNC: 349 MG/DL (ref 70–130)
GLUCOSE SERPL-MCNC: 243 MG/DL (ref 65–99)
HCT VFR BLD AUTO: 41.4 % (ref 37.5–51)
HGB BLD-MCNC: 13.6 G/DL (ref 13–17.7)
IMM GRANULOCYTES # BLD AUTO: 0.03 10*3/MM3 (ref 0–0.05)
IMM GRANULOCYTES NFR BLD AUTO: 0.3 % (ref 0–0.5)
LV EF NUC BP: 25 %
LYMPHOCYTES # BLD AUTO: 2.89 10*3/MM3 (ref 0.7–3.1)
LYMPHOCYTES NFR BLD AUTO: 26.1 % (ref 19.6–45.3)
MAGNESIUM SERPL-MCNC: 1.7 MG/DL (ref 1.6–2.6)
MAXIMAL PREDICTED HEART RATE: 166 BPM
MAXIMAL PREDICTED HEART RATE: 166 BPM
MCH RBC QN AUTO: 28.5 PG (ref 26.6–33)
MCHC RBC AUTO-ENTMCNC: 32.9 G/DL (ref 31.5–35.7)
MCV RBC AUTO: 86.6 FL (ref 79–97)
MONOCYTES # BLD AUTO: 0.74 10*3/MM3 (ref 0.1–0.9)
MONOCYTES NFR BLD AUTO: 6.7 % (ref 5–12)
NEUTROPHILS NFR BLD AUTO: 59.8 % (ref 42.7–76)
NEUTROPHILS NFR BLD AUTO: 6.64 10*3/MM3 (ref 1.7–7)
NRBC BLD AUTO-RTO: 0 /100 WBC (ref 0–0.2)
PERCENT MAX PREDICTED HR: 62.05 %
PLATELET # BLD AUTO: 342 10*3/MM3 (ref 140–450)
PMV BLD AUTO: 8.7 FL (ref 6–12)
POTASSIUM SERPL-SCNC: 4.5 MMOL/L (ref 3.5–5.2)
RBC # BLD AUTO: 4.78 10*6/MM3 (ref 4.14–5.8)
SODIUM SERPL-SCNC: 133 MMOL/L (ref 136–145)
STRESS BASELINE BP: NORMAL MMHG
STRESS BASELINE HR: 91 BPM
STRESS PERCENT HR: 73 %
STRESS POST PEAK BP: NORMAL MMHG
STRESS POST PEAK HR: 103 BPM
STRESS TARGET HR: 141 BPM
STRESS TARGET HR: 141 BPM
WBC # BLD AUTO: 11.09 10*3/MM3 (ref 3.4–10.8)

## 2021-10-07 PROCEDURE — 25010000002 PERFLUTREN (DEFINITY) 8.476 MG IN SODIUM CHLORIDE (PF) 0.9 % 10 ML INJECTION: Performed by: INTERNAL MEDICINE

## 2021-10-07 PROCEDURE — 25010000003 MAGNESIUM SULFATE 4 GM/100ML SOLUTION: Performed by: PHYSICIAN ASSISTANT

## 2021-10-07 PROCEDURE — 93017 CV STRESS TEST TRACING ONLY: CPT

## 2021-10-07 PROCEDURE — A9500 TC99M SESTAMIBI: HCPCS | Performed by: INTERNAL MEDICINE

## 2021-10-07 PROCEDURE — 93308 TTE F-UP OR LMTD: CPT

## 2021-10-07 PROCEDURE — 93018 CV STRESS TEST I&R ONLY: CPT | Performed by: INTERNAL MEDICINE

## 2021-10-07 PROCEDURE — 80048 BASIC METABOLIC PNL TOTAL CA: CPT | Performed by: INTERNAL MEDICINE

## 2021-10-07 PROCEDURE — 63710000001 INSULIN ASPART PER 5 UNITS: Performed by: INTERNAL MEDICINE

## 2021-10-07 PROCEDURE — 99232 SBSQ HOSP IP/OBS MODERATE 35: CPT | Performed by: NURSE PRACTITIONER

## 2021-10-07 PROCEDURE — 78452 HT MUSCLE IMAGE SPECT MULT: CPT | Performed by: INTERNAL MEDICINE

## 2021-10-07 PROCEDURE — 99239 HOSP IP/OBS DSCHRG MGMT >30: CPT | Performed by: INTERNAL MEDICINE

## 2021-10-07 PROCEDURE — 85025 COMPLETE CBC W/AUTO DIFF WBC: CPT | Performed by: INTERNAL MEDICINE

## 2021-10-07 PROCEDURE — 83735 ASSAY OF MAGNESIUM: CPT | Performed by: INTERNAL MEDICINE

## 2021-10-07 PROCEDURE — 25010000002 REGADENOSON 0.4 MG/5ML SOLUTION: Performed by: INTERNAL MEDICINE

## 2021-10-07 PROCEDURE — 93308 TTE F-UP OR LMTD: CPT | Performed by: INTERNAL MEDICINE

## 2021-10-07 PROCEDURE — 0 TECHNETIUM SESTAMIBI: Performed by: INTERNAL MEDICINE

## 2021-10-07 PROCEDURE — 78452 HT MUSCLE IMAGE SPECT MULT: CPT

## 2021-10-07 PROCEDURE — 82962 GLUCOSE BLOOD TEST: CPT

## 2021-10-07 PROCEDURE — 63710000001 INSULIN DETEMIR PER 5 UNITS: Performed by: PHYSICIAN ASSISTANT

## 2021-10-07 RX ORDER — ATORVASTATIN CALCIUM 80 MG/1
80 TABLET, FILM COATED ORAL NIGHTLY
Qty: 30 TABLET | Refills: 0 | Status: SHIPPED | OUTPATIENT
Start: 2021-10-07 | End: 2021-10-29 | Stop reason: SDUPTHER

## 2021-10-07 RX ORDER — MAGNESIUM SULFATE HEPTAHYDRATE 40 MG/ML
4 INJECTION, SOLUTION INTRAVENOUS ONCE
Status: COMPLETED | OUTPATIENT
Start: 2021-10-07 | End: 2021-10-07

## 2021-10-07 RX ORDER — ASPIRIN 81 MG/1
81 TABLET, CHEWABLE ORAL DAILY
Qty: 30 TABLET | Refills: 0 | Status: SHIPPED | OUTPATIENT
Start: 2021-10-07 | End: 2021-11-16

## 2021-10-07 RX ORDER — DOXYCYCLINE HYCLATE 100 MG/1
100 CAPSULE ORAL 2 TIMES DAILY
Qty: 10 CAPSULE | Refills: 0 | Status: SHIPPED | OUTPATIENT
Start: 2021-10-07 | End: 2021-10-12

## 2021-10-07 RX ORDER — CEFDINIR 300 MG/1
300 CAPSULE ORAL 2 TIMES DAILY
Qty: 10 CAPSULE | Refills: 0 | Status: SHIPPED | OUTPATIENT
Start: 2021-10-07 | End: 2021-10-12

## 2021-10-07 RX ORDER — SPIRONOLACTONE 25 MG/1
25 TABLET ORAL DAILY
Qty: 30 TABLET | Refills: 0 | Status: SHIPPED | OUTPATIENT
Start: 2021-10-07 | End: 2021-10-29 | Stop reason: SDUPTHER

## 2021-10-07 RX ADMIN — CARVEDILOL 6.25 MG: 6.25 TABLET, FILM COATED ORAL at 07:28

## 2021-10-07 RX ADMIN — APIXABAN 5 MG: 5 TABLET, FILM COATED ORAL at 20:50

## 2021-10-07 RX ADMIN — SODIUM CHLORIDE 1.5 ML: 9 INJECTION INTRAMUSCULAR; INTRAVENOUS; SUBCUTANEOUS at 10:39

## 2021-10-07 RX ADMIN — INSULIN ASPART 3 UNITS: 100 INJECTION, SOLUTION INTRAVENOUS; SUBCUTANEOUS at 07:26

## 2021-10-07 RX ADMIN — MAGNESIUM SULFATE HEPTAHYDRATE 4 G: 40 INJECTION, SOLUTION INTRAVENOUS at 18:06

## 2021-10-07 RX ADMIN — APIXABAN 5 MG: 5 TABLET, FILM COATED ORAL at 07:27

## 2021-10-07 RX ADMIN — SACUBITRIL AND VALSARTAN 1 TABLET: 24; 26 TABLET, FILM COATED ORAL at 07:31

## 2021-10-07 RX ADMIN — GUAIFENESIN 1200 MG: 600 TABLET ORAL at 20:49

## 2021-10-07 RX ADMIN — INSULIN ASPART 5 UNITS: 100 INJECTION, SOLUTION INTRAVENOUS; SUBCUTANEOUS at 20:49

## 2021-10-07 RX ADMIN — INSULIN DETEMIR 10 UNITS: 100 INJECTION, SOLUTION SUBCUTANEOUS at 20:50

## 2021-10-07 RX ADMIN — ATORVASTATIN CALCIUM 80 MG: 40 TABLET, FILM COATED ORAL at 20:49

## 2021-10-07 RX ADMIN — DOXYCYCLINE 100 MG: 100 INJECTION, POWDER, LYOPHILIZED, FOR SOLUTION INTRAVENOUS at 10:45

## 2021-10-07 RX ADMIN — ASPIRIN 81 MG: 81 TABLET, CHEWABLE ORAL at 07:31

## 2021-10-07 RX ADMIN — REGADENOSON 0.4 MG: 0.08 INJECTION, SOLUTION INTRAVENOUS at 09:40

## 2021-10-07 RX ADMIN — TECHNETIUM TC 99M SESTAMIBI 1 DOSE: 1 INJECTION INTRAVENOUS at 09:40

## 2021-10-07 RX ADMIN — SPIRONOLACTONE 25 MG: 25 TABLET ORAL at 07:27

## 2021-10-07 RX ADMIN — CARVEDILOL 6.25 MG: 6.25 TABLET, FILM COATED ORAL at 18:06

## 2021-10-07 RX ADMIN — SODIUM CHLORIDE, PRESERVATIVE FREE 10 ML: 5 INJECTION INTRAVENOUS at 07:32

## 2021-10-07 RX ADMIN — INSULIN ASPART 2 UNITS: 100 INJECTION, SOLUTION INTRAVENOUS; SUBCUTANEOUS at 18:06

## 2021-10-07 RX ADMIN — TECHNETIUM TC 99M SESTAMIBI 1 DOSE: 1 INJECTION INTRAVENOUS at 07:20

## 2021-10-07 RX ADMIN — GUAIFENESIN 1200 MG: 600 TABLET ORAL at 07:32

## 2021-10-07 RX ADMIN — INSULIN ASPART 5 UNITS: 100 INJECTION, SOLUTION INTRAVENOUS; SUBCUTANEOUS at 10:45

## 2021-10-07 RX ADMIN — SACUBITRIL AND VALSARTAN 1 TABLET: 24; 26 TABLET, FILM COATED ORAL at 20:49

## 2021-10-07 RX ADMIN — SODIUM CHLORIDE 1.5 ML: 9 INJECTION INTRAMUSCULAR; INTRAVENOUS; SUBCUTANEOUS at 08:22

## 2021-10-07 NOTE — PLAN OF CARE
Goal Outcome Evaluation:  Progress: improving   Pt had a stress test today. Awaiting results. Denies any needs. Ambulating well.

## 2021-10-07 NOTE — PHARMACY PATIENT ASSISTANCE
Pharmacy evaluated the costs of Entresto and Eliquis for patient. Both are covered on the patient's insurance with $110 copays, but since patient has commercial insurance, we can provide a copay card to reduce the patient's costs. Eliquis will be $5 per fill and Entresto will be $10 per fill. Free trial cards have been profiled at the [facility] so patient can get the first fill for free and then we have sent the copay cards to [facility] for future fills.    Thank you,    Jaja Ramirez, PharmD  10/07/21  14:12 EDT

## 2021-10-07 NOTE — PROGRESS NOTES
LOS: 0 days     Name: Cas Mccabe  Age/Sex: 56 y.o. male  :  1965        PCP: Monique Gambino APRN    Principal Problem:    Occipital infarction (HCC)      Admission Information: Cas Mccabe is a 56 y.o. male with a past medical history significant for nonobstructive coronary artery disease per cardiac catheterization of 2014, nonischemic cardiomyopathy, hyperlipidemia, hypertension, diabetes, and obesity.  The patient presented to the ED on 10/5/2021 with complaint of chest pain, headache and dyspnea.  Patient also reported some visual changes.  He was diagnosed with an acute right occipital infarct and admitted for further evaluation and management.  Cardiology was consulted for patient complaint of chest pain.    Chief Complaint: Follow-up cardiomyopathy, acute CVA    Interval history: Patient underwent nuclear stress test earlier today.  There were multiple perfusion abnormalities noted.  Patient has a medium to large size inferior infarction with mild ischemia extending into the inferior lateral territory, also noted mild ischemia in the distal anterior apical territory.  LVEF was calculated as 25% with severe global hypokinesia and apical dyskinesia.      Results were reviewed with the patient.    Subjective     Vital Signs  Vital Signs (last 72 hrs)       10/04 0700  -  10/05 0659 10/05 0700  -  10/06 0659 10/06 0700  -  10/07 0659 10/07 0700  -  10/07 1823   Most Recent    Temp (°F)   97.6 -  98.2    97.8 -  98.2      98.7     98.7 (37.1)    Heart Rate   87 -  114    92 -  96    88 -  98     88    Resp   15 -  20      18      18     18    BP   113/82 -  167/100    121/72 -  141/92    127/81 -  138/86     127/81    SpO2 (%)   95 -  99    96 -  99      97     97        Temp:  [97.9 °F (36.6 °C)-98.7 °F (37.1 °C)] 98.7 °F (37.1 °C)  Heart Rate:  [88-98] 88  Resp:  [18] 18  BP: (127-141)/(78-92) 127/81  Body mass index is 35.68 kg/m².      Intake/Output Summary (Last 24 hours) at 10/7/2021  1823  Last data filed at 10/7/2021 1700  Gross per 24 hour   Intake 780 ml   Output --   Net 780 ml       Vitals reviewed.   Constitutional:       Appearance: Well-developed.   Neck:      Vascular: No carotid bruit or JVD.   Pulmonary:      Effort: Pulmonary effort is normal. No respiratory distress.      Breath sounds: Normal breath sounds. No wheezing. No rales.   Cardiovascular:      Normal rate. Regular rhythm.   Skin:     General: Skin is warm and dry.   Neurological:      Mental Status: Alert and oriented to person, place, and time.         Telemetry: Sinus 80s     Results Review:     Results from last 7 days   Lab Units 10/07/21  0739 10/05/21  1104   WBC 10*3/mm3 11.09* 11.56*   HEMOGLOBIN g/dL 13.6 13.7   PLATELETS 10*3/mm3 342 332     Results from last 7 days   Lab Units 10/07/21  0739 10/05/21  1104   SODIUM mmol/L 133* 129*   POTASSIUM mmol/L 4.5 4.7   CHLORIDE mmol/L 99 95*   CO2 mmol/L 24.8 25.2   BUN mg/dL 19 23*   CREATININE mg/dL 0.54* 0.79   CALCIUM mg/dL 9.0 9.6   GLUCOSE mg/dL 243* 315*     Results from last 7 days   Lab Units 10/06/21  0444 10/05/21  1304 10/05/21  1104   TROPONIN T ng/mL <0.010 <0.010 <0.010       Results from last 7 days   Lab Units 10/05/21  1104   INR  0.90       I reviewed the patient's new clinical results.  I reviewed the patient's new imaging results and agree with the interpretation.  I personally viewed and interpreted the patient's EKG/Telemetry data      Medication Review:   apixaban, 5 mg, Oral, Q12H  aspirin, 81 mg, Oral, Daily  atorvastatin, 80 mg, Oral, Nightly  carvedilol, 6.25 mg, Oral, BID With Meals  cefTRIAXone, 1 g, Intravenous, Q24H  doxycycline, 100 mg, Intravenous, Q12H  guaiFENesin, 1,200 mg, Oral, Q12H  insulin aspart, 0-7 Units, Subcutaneous, 4x Daily AC & at Bedtime  insulin detemir, 10 Units, Subcutaneous, Nightly  magnesium sulfate, 4 g, Intravenous, Once  sacubitril-valsartan, 1 tablet, Oral, Q12H  sodium chloride, 10 mL, Intravenous,  Q12H  spironolactone, 25 mg, Oral, Daily           Assessment:  1. Abnormal nuclear stress test  2. Cardiomyopathy with concern for ischemic etiology given that the patient has an abnormal stress test.  3. History of nonobstructive coronary artery disease per cardiac catheterization of 2/18/2014.  Findings included 1 vessel disease with a 50% stenosis of the LAD in the distal segment, LVEF noted to be between 30 and 35%.  4. Acute right occipital infarct  5. Hypertension  6. Dyslipidemia  7. Uncontrolled diabetes  8. Obesity      Recommendations:  1. Nuclear stress test showed multiple perfusion abnormalities.  Patient has a medium to large size inferior infarction with mild ischemia extending into the inferior lateral territory, also noted mild ischemia in the distal anterior apical territory.  LVEF was calculated as 25% with severe global hypokinesia and apical dyskinesia.    2. Given that patient is in the acute phase of right occipital infarct and the nuclear stress test was abnormal, we would recommend cardiac catheterization to further define the etiology of patient's cardiomyopathy.  However due to this acute phase of his CVA, we will defer for 2 to 4 weeks.  In the meantime patient has been placed on GDMT for cardiomyopathy including Entresto, carvedilol and Aldactone.  LifeVest has been ordered.  3. Plan discussed with the patient and he is agreeable.    I discussed the patients findings and my recommendations with patient and family      ROSA MARIA Metz  10/07/21  18:23 EDT    Addendum:

## 2021-10-07 NOTE — DISCHARGE SUMMARY
Discharge Summary:    Date of Admission: 10/5/2021  Date of Discharge:  10/7/2021    PCP: Monique Gambino APRN    DISCHARGE DIAGNOSIS  -Chest pain the setting of known nonobstructive coronary artery disease  -Cardiomyopathy with concern for ischemic etiology  -Abnormal nuclear stress test  -Acute right occipital infarction/right posterior cerebral artery stroke  -Essential hypertension  -Diabetes mellitus type 2 with hyperglycemia; A1c reveals long-term poor control at 11.20%  -Dyslipidemia  -Obesity  -Sepsis, present on admission and secondary to bilateral community-acquired pneumonia  -Acute hypomagnesemia  -Positive Cologuard test September 2020; recommend PCP follow-up and ensure patient endoscopy up-to-date  -Chronic appearing subcortical infarct of the left frontal lobe  event recorder    SECONDARY DIAGNOSES  Past Medical History:   Diagnosis Date   • Combined systolic and diastolic congestive heart failure (HCC)    • Diabetes mellitus (HCC)    • Hyperlipidemia    • Hypertension        CONSULTS   Dr Hannon - Neurology  Nayeli Wheeler/Dr. Resendez - Interventional Cardiology    PROCEDURES PERFORMED  Echocardiogram:  Interpretation Summary    · Limited echo with IV contrast demonstrate no evidence of LV thrombus/ LV cavity is dilated with EF of 25% as before.  · No pericardial effusion    Stress Test:  · Pt has multiple perfusion abnormalities, has a medium to large size inferior infarction with mild ischemia extending into inferolateral territory, also noted mild ischemia in the distal anteroapical territory.  · Diaphragmatic attenuation artifact is present.  · Left ventricular ejection fraction is moderately reduced. (Calculated EF = 25%) with severe globaly hypokinesia and apical dyskinesia  · Impressions are consistent with a high risk study.     MRI Brain without contrast:  FINDINGS:    Brain:  Corresponding to CT abnormality in the right occipital lobe,  restricted diffusion is noted with correspondingly low ADC  signal  compatible with acute ischemic infarct. No MRI evidence of hemorrhage.   Chronic small vessel ischemic disease is noted.  Chronic appearing  subcortical infarct left frontal lobe.  Normal age appropriate brain  volume is noted.    Midline shift:  No midline shift.    Ventricles:  No hydrocephalus.    Bones/joints:  Unremarkable.    Sinuses:  Unremarkable as visualized.  No acute sinusitis.    Mastoid air cells:  Unremarkable as visualized.  No mastoid effusion.    Orbits:  Unremarkable as visualized.     IMPRESSION:    Right occipital region acute infarct.    CT chest without contrast:  IMPRESSION:  1.  Bilateral centrilobular nodular opacities as described above both  semisolid and groundglass in nature in a pattern that favors atypical  pneumonia. Please note, COVID pneumonia is within the differential.  2.  More focal conglomerative opacity in the right lung apex also likely  pneumonia. Follow-up after treatment to confirm resolution/improvement.  3.  Cardiomegaly with trace effusions and interstitial thickening with  pulmonary vascular congestion indicative of changes of CHF.  4.  Fatty liver.  5.  Other nonacute findings as above.    HOSPITAL COURSE  Patient is a 56 y.o. male presented to [facility] complaining of chest pain, dyspnea and headache.  Please see the admitting history and physical for further details.      Patient was admitted to the hospital medicine service.  CT scan obtained in the emergency department revealed concern for an acute CVA.  MRI was performed and did confirm a right occipital stroke.  And also found to have bilateral upper lobe pneumonia.  Patient was started on treatment for his acute occipital infarct with aspirin and statin therapy.  Patient was started on Ceftin and doxycycline for pneumonia.  Patient ruled out for acute myocardial infarction but given his known history of nonobstructive coronary artery disease and previous history of congestive heart failure,  "cardiology was consulted and the patient did undergo a repeat echocardiogram and stress test with the above-mentioned findings.  Interventional cardiology was consulted early on during the since hospital stay and patient was placed on cardiac appropriate therapy.  As patient had just recently suffered an acute CVA, Dr. Resendez, the interventional cardiologist felt it best that patient be treated medically at this time with consideration of an left heart catheterization in approximately 2 weeks.  Patient will have follow-up with Dr. Resendez really after discharge.    Patient states that he preferred to avoid insulin therapy at this time and states that he will adhere to a consistent carbohydrate diet as recently he had not.  Given patient's diagnosis of CHF with reduced ejection fraction, I did start the patient on Jardiance in addition to his Metformin therapy.  Patient encouraged to monitor his blood glucose levels frequently and to keep a log for his primary care provider.    The patient's poor ejection fraction 25%, the patient was fitted with a LifeVest prior to discharge.  The patient was also set up with an event monitor to wear for the next 2 weeks.    Patient's IV antibiotics were converted to oral Omnicef and doxycycline.    Symptomatically, the patient was feeling much better by the time of discharge.  It was felt the patient had maximized the benefit of his inpatient hospital stay and the patient was discharged home in hemodynamically stable condition.    CONDITION ON DISCHARGE  Stable.      VITAL SIGNS  /81 (BP Location: Right arm, Patient Position: Lying)   Pulse 88   Temp 98.7 °F (37.1 °C) (Oral)   Resp 18   Ht 175.3 cm (69\")   Wt 110 kg (241 lb 9.6 oz)   SpO2 97%   BMI 35.68 kg/m²   Objective:  General Appearance:  Comfortable, well-appearing and in no acute distress.    Vital signs: (most recent): Blood pressure 127/81, pulse 88, temperature 98.7 °F (37.1 °C), temperature source Oral, resp. " "rate 18, height 175.3 cm (69\"), weight 110 kg (241 lb 9.6 oz), SpO2 97 %.  Vital signs are normal.    HEENT: Normal HEENT exam.    Lungs:  Normal effort.  Breath sounds clear to auscultation.  No rales, wheezes or rhonchi.    Heart: Normal rate.  S1 normal and S2 normal.  No murmur, gallop or friction rub.   Chest: Symmetric chest wall expansion.   Abdomen: Abdomen is soft and non-distended.  Bowel sounds are normal.   There is no abdominal tenderness.     Extremities: There is no deformity or dependent edema.    Pulses: Distal pulses are intact.    Neurological: Patient is alert and oriented to person, place and time.  Normal strength.    Skin:  Warm and dry.  No rash or ulceration.             DISCHARGE DISPOSITION   Home or Self Care    DISCHARGE MEDICATIONS     Discharge Medications      New Medications      Instructions Start Date   apixaban 5 MG tablet tablet  Commonly known as: ELIQUIS   5 mg, Oral, Every 12 Hours Scheduled      aspirin 81 MG chewable tablet   81 mg, Oral, Daily      atorvastatin 80 MG tablet  Commonly known as: LIPITOR   80 mg, Oral, Nightly      cefdinir 300 MG capsule  Commonly known as: OMNICEF   300 mg, Oral, 2 Times Daily      doxycycline 100 MG capsule  Commonly known as: VIBRAMYCIN   100 mg, Oral, 2 Times Daily      empagliflozin 10 MG tablet tablet  Commonly known as: Jardiance   10 mg, Oral, Daily      sacubitril-valsartan 24-26 MG tablet  Commonly known as: ENTRESTO   1 tablet, Oral, Every 12 Hours Scheduled      spironolactone 25 MG tablet  Commonly known as: ALDACTONE   25 mg, Oral, Daily         Continue These Medications      Instructions Start Date   carvedilol 6.25 MG tablet  Commonly known as: COREG   6.25 mg, Oral, 2 Times Daily With Meals      glipizide 10 MG tablet  Commonly known as: GLUCOTROL   10 mg, Oral, 2 Times Daily Before Meals      guaiFENesin 600 MG 12 hr tablet  Commonly known as: MUCINEX   1,200 mg, Oral, 2 Times Daily      metFORMIN 1000 MG tablet  Commonly " known as: GLUCOPHAGE   1,000 mg, Oral, 2 Times Daily With Meals         Stop These Medications    losartan 50 MG tablet  Commonly known as: COZAAR     pravastatin 10 MG tablet  Commonly known as: PRAVACHOL            DISCHARGE DIET diabetic, cardiac, 1.5 L daily fluid restriction  Dietary Orders (From admission, onward)     Start     Ordered    10/07/21 1048  Diet Regular; Cardiac, Consistent Carbohydrate  Diet Effective Now     Question Answer Comment   Diet Texture / Consistency Regular    Common Modifiers Cardiac    Common Modifiers Consistent Carbohydrate        10/07/21 1047                ACTIVITY AT DISCHARGE   activity as tolerated      Additional Instructions for the Follow-ups that You Need to Schedule     Discharge Follow-up with PCP   As directed       Currently Documented PCP:    Monique Gambino APRN    PCP Phone Number:    [phone]     Follow Up Details: 1 week with BMP and CBC; hospital follow-up for stroke, CHF exacerbation and pneumonia         Discharge Follow-up with Specified Provider: Dr. Resendez; 2 Weeks   As directed      To: Dr. Resendez    Follow Up: 2 Weeks    Follow Up Details: Hospital follow-up CHF exacerbation and abnormal stress test               TEST  RESULTS PENDING AT DISCHARGE  Pending Labs     Order Current Status    Blood Culture - Blood, Arm, Left Preliminary result    Blood Culture - Blood, Arm, Right Preliminary result             Marivel Hernandez DO  10/07/21  18:21 EDT      Time: greater than 30 minutes.

## 2021-10-08 ENCOUNTER — READMISSION MANAGEMENT (OUTPATIENT)
Dept: CALL CENTER | Facility: HOSPITAL | Age: 56
End: 2021-10-08

## 2021-10-08 NOTE — NURSING NOTE
Patients d/c instruction explained and patient verbalized understanding. IV removed. Patient refused wheelchair and wanted to walk out. No complaints. All belongings took with patient.

## 2021-10-08 NOTE — CASE MANAGEMENT/SOCIAL WORK
Discharge Planning Assessment   [facility]     Patient Name: Cas Mccabe  MRN: [id number]  Today's Date: 10/8/2021    Admit Date: 10/5/2021        Discharge Plan     Row Name 10/08/21 0915       Plan    Final Discharge Disposition Code  01 - home or self-care    Final Note  Pt discharged home.  Dinesh Hernandez with [employer] was arranged on 10/7/21.      ESPERANZA BowdenW

## 2021-10-08 NOTE — PAYOR COMM NOTE
"CONTACT:   Michelle Isbell RN  Phone: [phone]  Fax: [phone]    DISCHARGE NOTIFICATION    DISCHARGE TO: home     REF #  [id number]  DC DATE: 10/7/21    IF YOU NEED ANYTHING FURTHER PLEASE LET ME KNOW.   THANKS     Cas Fitzgerald (56 y.o. Male)     Date of Birth Social Security Number Address Home Phone MRN    1965  [address]  [facility] [address] [phone] [id number]    [employer] Marital Status          None        Admission Date Admission Type Admitting Provider Attending Provider Department, Room/Bed    10/5/21 Emergency Marivel Hernandez DO  [address], 3307/1S    Discharge Date Discharge Disposition Discharge Destination        10/7/2021 Home or Self Care              Attending Provider: (none)   Allergies: No Known Allergies    Isolation: None   Infection: None   Code Status: Prior    Ht: 175.3 cm (69\")   Wt: 110 kg (241 lb 9.6 oz)    Admission Cmt: None   Principal Problem: Occipital infarction (HCC) [I63.9]                 Active Insurance as of 10/5/2021     Primary Coverage     Payor Plan Insurance Group Employer/Plan Group    [facility] [id number]     Payor Plan Address Payor Plan Phone Number Payor Plan Fax Number Effective Dates    [address]   10/5/2021 - None Entered    [facility] [address]       Subscriber Name Subscriber Birth Date Member ID       CAS FITZGERALD 1965 [id number]                 Emergency Contacts      (Rel.) Home Phone Work Phone Mobile Phone    Dipika Fitzgerald (Spouse) [phone] -- --        Discharge patient [ADT8] (Order [id number])  Order  Date: 10/7/2021 Department: [address] Released By/Authorizing: Marivel Hernandez DO (auto-released)   Discharge Info    Discharge Disposition Expected Discharge Date   Home or Self Care 10/07/21   Order History  Inpatient  Date/Time Action Taken User Additional Information   10/07/21 1821 Release Marivel Hernandez DO " (auto-released) From Order:[id number]   10/07/21 2102 Complete Kaleigh Marshall, PCT    Order Details    Frequency Duration Priority Order Class   Once 1  occurrence Routine Hospital Performed   Start Date/Time    Start Date Start Time   10/07/21 1809   Order Information    Order Date Service Start Date Start Time End Date   10/07/21 Medicine 10/07/21 1809 10/07/21   Reference Links    Associated Reports External References   View Encounter Link to Physician of Record Policy   Order Questions    Question Answer Comment   Physician of Record for Attribution - Please select from Treatment Team GENEVA RAMOS    Review needed by CMO to determine Physician of Record No           Process Instructions    The physician of record will be attached to the hospital claim and will be used to allocate all resources for the care of the patient as well as assign quality and outcome scores for the patients assigned to him/her.      Completion Info    User Date/Time   Kaleigh Marshall PCT 10/07/21 2102   Reprint Order Requisition    Discharge patient (Order #[id number]) on 10/7/21   Encounter    View Encounter          Order Provider Info        Office phone Pager E-mail   Ordering User Geneva Ramos [phone] -- --   Authorizing Provider Geneva Ramos [phone] --   Attending Provider When Ordered Geneva Ramos [phone] --   Linked Charges    No charges linked to this procedure   Tracking Reports  Cosign Tracking Order Transmittal Tracking   Authorized by:  Geneva Ramos DO  (NPI: [id number])       Lab Component SmartPhrase Guide    Discharge patient (Order #[id number]) on 10/7/21

## 2021-10-08 NOTE — OUTREACH NOTE
Prep Survey      Responses   [facility] patient discharged from?  [facility]   Is LACE score < 7 ?  Yes   Emergency Room discharge w/ pulse ox?  No   Eligibility  Readm Mgmt   Discharge diagnosis   Chest pain, headache, dyspneaRight posterior cerebral artery stroke   Does the patient have one of the following disease processes/diagnoses(primary or secondary)?  Stroke (TIA)   Does the patient have Home health ordered?  No   Is there a DME ordered?  No   Prep survey completed?  Yes          Deirdre Amato RN

## 2021-10-10 LAB
BACTERIA SPEC AEROBE CULT: NORMAL
BACTERIA SPEC AEROBE CULT: NORMAL

## 2021-10-11 ENCOUNTER — READMISSION MANAGEMENT (OUTPATIENT)
Dept: CALL CENTER | Facility: HOSPITAL | Age: 56
End: 2021-10-11

## 2021-10-11 ENCOUNTER — TELEPHONE (OUTPATIENT)
Dept: TELEMETRY | Facility: HOSPITAL | Age: 56
End: 2021-10-11

## 2021-10-11 ENCOUNTER — TRANSCRIBE ORDERS (OUTPATIENT)
Dept: ADMINISTRATIVE | Facility: HOSPITAL | Age: 56
End: 2021-10-11

## 2021-10-11 DIAGNOSIS — Z01.818 OTHER SPECIFIED PRE-OPERATIVE EXAMINATION: Primary | ICD-10-CM

## 2021-10-11 NOTE — OUTREACH NOTE
Stroke Week 1 Survey      Responses   [facility] patient discharged from? [facility]   Does the patient have one of the following disease processes/diagnoses(primary or secondary)? Stroke (TIA)   Week 1 attempt successful? Yes   Call start time 1641   Call end time 1646   Discharge diagnosis  Chest pain, headache, dyspneaRight posterior cerebral artery stroke   Is patient permission given to speak with other caregiver? Yes   List who call center can speak with wife   Person spoke with today (if not patient) and relationship wife   Meds reviewed with patient/caregiver? Yes   Is the patient having any side effects they believe may be caused by any medication additions or changes? No   Does the patient have all medications ordered at discharge? Yes   Is the patient taking all medications as directed (includes completed medication regime)? Yes   Does the patient have a primary care provider?  Yes   Does the patient have an appointment with their PCP within 7 days of discharge? Yes   Comments regarding PCP [facility] 10-19,  Cardio 11-16,  PCP f/u 10-15   Has the patient kept scheduled appointments due by today? N/A   Psychosocial issues? No   Does the patient require any assistance with activities of daily living such as eating, bathing, dressing, walking, etc.? No   Does the patient have any residual symptoms from stroke/TIA? Yes   Residual symptoms comments Has had some headaches since DC, his vision is improving daily per wife. It is cloudy at times.    Does the patient understand the diet ordered at discharge? Yes   Did the patient receive a copy of their discharge instructions? Yes   Nursing interventions Reviewed instructions with patient   What is the patient's perception of their health status since discharge? Improving   Nursing interventions Nurse provided patient education   Is the patient able to teach back FAST for Stroke? Yes   Is the patient/caregiver able to teach back the risk factors for a stroke? High  blood pressure-goal below 120/80,  Diabetes   Is the patient/caregiver able to teach back signs and symptoms related to disease process for when to call PCP? Yes   Is the patient/caregiver able to teach back signs and symptoms related to disease process for when to call 911? Yes   If the patient is a current smoker, are they able to teach back resources for cessation? Not a smoker   Is the patient/caregiver able to teach back the hierarchy of who to call/visit for symptoms/problems? PCP, Specialist, Home health nurse, Urgent Care, ED, 911 Yes   Week 1 call completed? Yes          Ada Ruiz, АНДРЕЙ

## 2021-10-15 ENCOUNTER — LAB (OUTPATIENT)
Dept: LAB | Facility: HOSPITAL | Age: 56
End: 2021-10-15

## 2021-10-15 DIAGNOSIS — Z01.818 OTHER SPECIFIED PRE-OPERATIVE EXAMINATION: ICD-10-CM

## 2021-10-15 LAB — SARS-COV-2 RNA PNL SPEC NAA+PROBE: NOT DETECTED

## 2021-10-15 PROCEDURE — C9803 HOPD COVID-19 SPEC COLLECT: HCPCS

## 2021-10-15 PROCEDURE — U0004 COV-19 TEST NON-CDC HGH THRU: HCPCS

## 2021-10-18 DIAGNOSIS — R07.2 PRECORDIAL PAIN: Primary | ICD-10-CM

## 2021-10-18 DIAGNOSIS — I42.0 NONISCHEMIC CONGESTIVE CARDIOMYOPATHY (HCC): ICD-10-CM

## 2021-10-18 DIAGNOSIS — E78.2 MIXED HYPERLIPIDEMIA: ICD-10-CM

## 2021-10-19 ENCOUNTER — HOSPITAL ENCOUNTER (OUTPATIENT)
Facility: HOSPITAL | Age: 56
Setting detail: HOSPITAL OUTPATIENT SURGERY
Discharge: HOME | End: 2021-10-19
Attending: INTERNAL MEDICINE | Admitting: INTERNAL MEDICINE

## 2021-10-19 VITALS
BODY MASS INDEX: 36.43 KG/M2 | SYSTOLIC BLOOD PRESSURE: 133 MMHG | OXYGEN SATURATION: 98 % | HEART RATE: 83 BPM | WEIGHT: 246 LBS | TEMPERATURE: 98.1 F | RESPIRATION RATE: 18 BRPM | HEIGHT: 69 IN | DIASTOLIC BLOOD PRESSURE: 90 MMHG

## 2021-10-19 DIAGNOSIS — E78.2 MIXED HYPERLIPIDEMIA: ICD-10-CM

## 2021-10-19 DIAGNOSIS — R07.2 PRECORDIAL PAIN: ICD-10-CM

## 2021-10-19 DIAGNOSIS — I42.0 NONISCHEMIC CONGESTIVE CARDIOMYOPATHY (HCC): ICD-10-CM

## 2021-10-19 LAB
ALBUMIN SERPL-MCNC: 3.9 G/DL (ref 3.5–5.2)
ALBUMIN/GLOB SERPL: 1.1 G/DL
ALP SERPL-CCNC: 83 U/L (ref 39–117)
ALT SERPL W P-5'-P-CCNC: 19 U/L (ref 1–41)
ANION GAP SERPL CALCULATED.3IONS-SCNC: 9.1 MMOL/L (ref 5–15)
AST SERPL-CCNC: 13 U/L (ref 1–40)
BASOPHILS # BLD AUTO: 0.13 10*3/MM3 (ref 0–0.2)
BASOPHILS NFR BLD AUTO: 1.4 % (ref 0–1.5)
BILIRUB SERPL-MCNC: 0.7 MG/DL (ref 0–1.2)
BUN SERPL-MCNC: 16 MG/DL (ref 6–20)
BUN/CREAT SERPL: 18.4 (ref 7–25)
CALCIUM SPEC-SCNC: 9.8 MG/DL (ref 8.6–10.5)
CHLORIDE SERPL-SCNC: 101 MMOL/L (ref 98–107)
CO2 SERPL-SCNC: 26.9 MMOL/L (ref 22–29)
CREAT SERPL-MCNC: 0.87 MG/DL (ref 0.76–1.27)
DEPRECATED RDW RBC AUTO: 40.4 FL (ref 37–54)
EOSINOPHIL # BLD AUTO: 0.37 10*3/MM3 (ref 0–0.4)
EOSINOPHIL NFR BLD AUTO: 4 % (ref 0.3–6.2)
ERYTHROCYTE [DISTWIDTH] IN BLOOD BY AUTOMATED COUNT: 12.5 % (ref 12.3–15.4)
GFR SERPL CREATININE-BSD FRML MDRD: 91 ML/MIN/1.73
GLOBULIN UR ELPH-MCNC: 3.4 GM/DL
GLUCOSE SERPL-MCNC: 208 MG/DL (ref 65–99)
HCT VFR BLD AUTO: 44.9 % (ref 37.5–51)
HGB BLD-MCNC: 14.6 G/DL (ref 13–17.7)
IMM GRANULOCYTES # BLD AUTO: 0.02 10*3/MM3 (ref 0–0.05)
IMM GRANULOCYTES NFR BLD AUTO: 0.2 % (ref 0–0.5)
LYMPHOCYTES # BLD AUTO: 3.2 10*3/MM3 (ref 0.7–3.1)
LYMPHOCYTES NFR BLD AUTO: 34.6 % (ref 19.6–45.3)
MCH RBC QN AUTO: 28.8 PG (ref 26.6–33)
MCHC RBC AUTO-ENTMCNC: 32.5 G/DL (ref 31.5–35.7)
MCV RBC AUTO: 88.6 FL (ref 79–97)
MONOCYTES # BLD AUTO: 0.71 10*3/MM3 (ref 0.1–0.9)
MONOCYTES NFR BLD AUTO: 7.7 % (ref 5–12)
NEUTROPHILS NFR BLD AUTO: 4.83 10*3/MM3 (ref 1.7–7)
NEUTROPHILS NFR BLD AUTO: 52.1 % (ref 42.7–76)
NRBC BLD AUTO-RTO: 0 /100 WBC (ref 0–0.2)
PLATELET # BLD AUTO: 300 10*3/MM3 (ref 140–450)
PMV BLD AUTO: 8.7 FL (ref 6–12)
POTASSIUM SERPL-SCNC: 4.1 MMOL/L (ref 3.5–5.2)
PROT SERPL-MCNC: 7.3 G/DL (ref 6–8.5)
RBC # BLD AUTO: 5.07 10*6/MM3 (ref 4.14–5.8)
SODIUM SERPL-SCNC: 137 MMOL/L (ref 136–145)
WBC # BLD AUTO: 9.26 10*3/MM3 (ref 3.4–10.8)

## 2021-10-19 PROCEDURE — 80053 COMPREHEN METABOLIC PANEL: CPT | Performed by: INTERNAL MEDICINE

## 2021-10-19 PROCEDURE — 25010000002 HEPARIN (PORCINE) PER 1000 UNITS: Performed by: INTERNAL MEDICINE

## 2021-10-19 PROCEDURE — 85025 COMPLETE CBC W/AUTO DIFF WBC: CPT | Performed by: INTERNAL MEDICINE

## 2021-10-19 PROCEDURE — 0 IOPAMIDOL PER 1 ML: Performed by: INTERNAL MEDICINE

## 2021-10-19 PROCEDURE — 99152 MOD SED SAME PHYS/QHP 5/>YRS: CPT | Performed by: INTERNAL MEDICINE

## 2021-10-19 PROCEDURE — 25010000002 MIDAZOLAM PER 1 MG: Performed by: INTERNAL MEDICINE

## 2021-10-19 PROCEDURE — 93458 L HRT ARTERY/VENTRICLE ANGIO: CPT | Performed by: INTERNAL MEDICINE

## 2021-10-19 PROCEDURE — C1894 INTRO/SHEATH, NON-LASER: HCPCS | Performed by: INTERNAL MEDICINE

## 2021-10-19 PROCEDURE — C1769 GUIDE WIRE: HCPCS | Performed by: INTERNAL MEDICINE

## 2021-10-19 PROCEDURE — 25010000002 FENTANYL CITRATE (PF) 50 MCG/ML SOLUTION: Performed by: INTERNAL MEDICINE

## 2021-10-19 RX ORDER — SODIUM CHLORIDE 9 MG/ML
INJECTION, SOLUTION INTRAVENOUS CONTINUOUS PRN
Status: COMPLETED | OUTPATIENT
Start: 2021-10-19 | End: 2021-10-19

## 2021-10-19 RX ORDER — HEPARIN SODIUM 1000 [USP'U]/ML
INJECTION, SOLUTION INTRAVENOUS; SUBCUTANEOUS AS NEEDED
Status: DISCONTINUED | OUTPATIENT
Start: 2021-10-19 | End: 2021-10-19 | Stop reason: HOSPADM

## 2021-10-19 RX ORDER — LIDOCAINE HYDROCHLORIDE 20 MG/ML
INJECTION, SOLUTION INFILTRATION; PERINEURAL AS NEEDED
Status: DISCONTINUED | OUTPATIENT
Start: 2021-10-19 | End: 2021-10-19 | Stop reason: HOSPADM

## 2021-10-19 RX ORDER — FENTANYL CITRATE 50 UG/ML
INJECTION, SOLUTION INTRAMUSCULAR; INTRAVENOUS AS NEEDED
Status: DISCONTINUED | OUTPATIENT
Start: 2021-10-19 | End: 2021-10-19 | Stop reason: HOSPADM

## 2021-10-19 RX ORDER — MIDAZOLAM HYDROCHLORIDE 1 MG/ML
INJECTION INTRAMUSCULAR; INTRAVENOUS AS NEEDED
Status: DISCONTINUED | OUTPATIENT
Start: 2021-10-19 | End: 2021-10-19 | Stop reason: HOSPADM

## 2021-10-19 RX ORDER — SODIUM CHLORIDE 9 MG/ML
75 INJECTION, SOLUTION INTRAVENOUS CONTINUOUS
Status: DISCONTINUED | OUTPATIENT
Start: 2021-10-19 | End: 2021-10-19 | Stop reason: HOSPADM

## 2021-10-19 NOTE — DISCHARGE INSTR - APPOINTMENTS
Keep your scheduled follow up appointment with Dr. Resendez.        Someone from the Interventional Cardiology office from [facility] will be calling to schedule an appointment for you.

## 2021-10-22 ENCOUNTER — PREP FOR SURGERY (OUTPATIENT)
Dept: OTHER | Facility: HOSPITAL | Age: 56
End: 2021-10-22

## 2021-10-22 DIAGNOSIS — E78.2 MIXED HYPERLIPIDEMIA: ICD-10-CM

## 2021-10-22 DIAGNOSIS — I42.0 NONISCHEMIC CONGESTIVE CARDIOMYOPATHY (HCC): Primary | ICD-10-CM

## 2021-10-22 RX ORDER — SODIUM CHLORIDE 0.9 % (FLUSH) 0.9 %
10 SYRINGE (ML) INJECTION AS NEEDED
Status: CANCELLED | OUTPATIENT
Start: 2021-10-22

## 2021-10-22 RX ORDER — ASPIRIN 81 MG/1
324 TABLET, CHEWABLE ORAL ONCE
Status: CANCELLED | OUTPATIENT
Start: 2021-10-22 | End: 2021-10-22

## 2021-10-22 RX ORDER — SODIUM CHLORIDE 0.9 % (FLUSH) 0.9 %
3 SYRINGE (ML) INJECTION EVERY 12 HOURS SCHEDULED
Status: CANCELLED | OUTPATIENT
Start: 2021-10-22

## 2021-10-22 RX ORDER — ASPIRIN 81 MG/1
81 TABLET ORAL DAILY
Status: CANCELLED | OUTPATIENT
Start: 2021-10-23

## 2021-10-28 ENCOUNTER — HOSPITAL ENCOUNTER (OUTPATIENT)
Facility: HOSPITAL | Age: 56
Setting detail: HOSPITAL OUTPATIENT SURGERY
Discharge: HOME | End: 2021-10-28
Attending: INTERNAL MEDICINE | Admitting: INTERNAL MEDICINE

## 2021-10-28 VITALS
WEIGHT: 233.03 LBS | HEIGHT: 69 IN | BODY MASS INDEX: 34.51 KG/M2 | SYSTOLIC BLOOD PRESSURE: 114 MMHG | HEART RATE: 84 BPM | TEMPERATURE: 97.6 F | DIASTOLIC BLOOD PRESSURE: 77 MMHG | OXYGEN SATURATION: 98 % | RESPIRATION RATE: 16 BRPM

## 2021-10-28 DIAGNOSIS — E78.2 MIXED HYPERLIPIDEMIA: ICD-10-CM

## 2021-10-28 DIAGNOSIS — I42.0 NONISCHEMIC CONGESTIVE CARDIOMYOPATHY (HCC): ICD-10-CM

## 2021-10-28 LAB
ACT BLD: 296 SECONDS (ref 82–152)
ANION GAP SERPL CALCULATED.3IONS-SCNC: 14 MMOL/L (ref 5–15)
BUN SERPL-MCNC: 23 MG/DL (ref 6–20)
BUN/CREAT SERPL: 27.4 (ref 7–25)
CALCIUM SPEC-SCNC: 9.7 MG/DL (ref 8.6–10.5)
CHLORIDE SERPL-SCNC: 98 MMOL/L (ref 98–107)
CHOLEST SERPL-MCNC: 119 MG/DL (ref 0–200)
CO2 SERPL-SCNC: 24 MMOL/L (ref 22–29)
CREAT SERPL-MCNC: 0.84 MG/DL (ref 0.76–1.27)
DEPRECATED RDW RBC AUTO: 38.9 FL (ref 37–54)
ERYTHROCYTE [DISTWIDTH] IN BLOOD BY AUTOMATED COUNT: 12.4 % (ref 12.3–15.4)
GFR SERPL CREATININE-BSD FRML MDRD: 95 ML/MIN/1.73
GLUCOSE SERPL-MCNC: 194 MG/DL (ref 65–99)
HBA1C MFR BLD: 10.7 % (ref 4.8–5.6)
HCT VFR BLD AUTO: 43.3 % (ref 37.5–51)
HDLC SERPL-MCNC: 36 MG/DL (ref 40–60)
HGB BLD-MCNC: 14.6 G/DL (ref 13–17.7)
LDLC SERPL CALC-MCNC: 59 MG/DL (ref 0–100)
LDLC/HDLC SERPL: 1.55 {RATIO}
MCH RBC QN AUTO: 29.1 PG (ref 26.6–33)
MCHC RBC AUTO-ENTMCNC: 33.7 G/DL (ref 31.5–35.7)
MCV RBC AUTO: 86.4 FL (ref 79–97)
PLATELET # BLD AUTO: 284 10*3/MM3 (ref 140–450)
PMV BLD AUTO: 9.1 FL (ref 6–12)
POTASSIUM SERPL-SCNC: 4.7 MMOL/L (ref 3.5–5.2)
RBC # BLD AUTO: 5.01 10*6/MM3 (ref 4.14–5.8)
SODIUM SERPL-SCNC: 136 MMOL/L (ref 136–145)
TRIGL SERPL-MCNC: 136 MG/DL (ref 0–150)
VLDLC SERPL-MCNC: 24 MG/DL (ref 5–40)
WBC # BLD AUTO: 9.02 10*3/MM3 (ref 3.4–10.8)

## 2021-10-28 PROCEDURE — C1887 CATHETER, GUIDING: HCPCS | Performed by: INTERNAL MEDICINE

## 2021-10-28 PROCEDURE — 0 IOPAMIDOL PER 1 ML: Performed by: INTERNAL MEDICINE

## 2021-10-28 PROCEDURE — C1753 CATH, INTRAVAS ULTRASOUND: HCPCS | Performed by: INTERNAL MEDICINE

## 2021-10-28 PROCEDURE — 99152 MOD SED SAME PHYS/QHP 5/>YRS: CPT | Performed by: INTERNAL MEDICINE

## 2021-10-28 PROCEDURE — 80061 LIPID PANEL: CPT | Performed by: HOSPITALIST

## 2021-10-28 PROCEDURE — 92978 ENDOLUMINL IVUS OCT C 1ST: CPT | Performed by: INTERNAL MEDICINE

## 2021-10-28 PROCEDURE — 99153 MOD SED SAME PHYS/QHP EA: CPT | Performed by: INTERNAL MEDICINE

## 2021-10-28 PROCEDURE — 25010000002 HEPARIN (PORCINE) PER 1000 UNITS: Performed by: INTERNAL MEDICINE

## 2021-10-28 PROCEDURE — 25010000002 MIDAZOLAM PER 1 MG: Performed by: INTERNAL MEDICINE

## 2021-10-28 PROCEDURE — C9600 PERC DRUG-EL COR STENT SING: HCPCS | Performed by: INTERNAL MEDICINE

## 2021-10-28 PROCEDURE — C1769 GUIDE WIRE: HCPCS | Performed by: INTERNAL MEDICINE

## 2021-10-28 PROCEDURE — 80048 BASIC METABOLIC PNL TOTAL CA: CPT | Performed by: HOSPITALIST

## 2021-10-28 PROCEDURE — 85027 COMPLETE CBC AUTOMATED: CPT | Performed by: HOSPITALIST

## 2021-10-28 PROCEDURE — C1725 CATH, TRANSLUMIN NON-LASER: HCPCS | Performed by: INTERNAL MEDICINE

## 2021-10-28 PROCEDURE — C1894 INTRO/SHEATH, NON-LASER: HCPCS | Performed by: INTERNAL MEDICINE

## 2021-10-28 PROCEDURE — C1874 STENT, COATED/COV W/DEL SYS: HCPCS | Performed by: INTERNAL MEDICINE

## 2021-10-28 PROCEDURE — 83036 HEMOGLOBIN GLYCOSYLATED A1C: CPT | Performed by: HOSPITALIST

## 2021-10-28 PROCEDURE — 25010000002 FENTANYL CITRATE (PF) 50 MCG/ML SOLUTION: Performed by: INTERNAL MEDICINE

## 2021-10-28 PROCEDURE — 93005 ELECTROCARDIOGRAM TRACING: CPT | Performed by: INTERNAL MEDICINE

## 2021-10-28 PROCEDURE — 85347 COAGULATION TIME ACTIVATED: CPT

## 2021-10-28 PROCEDURE — 92928 PRQ TCAT PLMT NTRAC ST 1 LES: CPT | Performed by: INTERNAL MEDICINE

## 2021-10-28 DEVICE — XIENCE SIERRA™ EVEROLIMUS ELUTING CORONARY STENT SYSTEM 4.00 MM X 08 MM / RAPID-EXCHANGE
Type: IMPLANTABLE DEVICE | Status: FUNCTIONAL
Brand: XIENCE SIERRA™

## 2021-10-28 RX ORDER — SODIUM CHLORIDE 9 MG/ML
50 INJECTION, SOLUTION INTRAVENOUS CONTINUOUS
Status: DISCONTINUED | OUTPATIENT
Start: 2021-10-28 | End: 2021-10-28 | Stop reason: HOSPADM

## 2021-10-28 RX ORDER — CLOPIDOGREL BISULFATE 75 MG/1
600 TABLET ORAL ONCE
Status: COMPLETED | OUTPATIENT
Start: 2021-10-28 | End: 2021-10-28

## 2021-10-28 RX ORDER — SODIUM CHLORIDE 0.9 % (FLUSH) 0.9 %
3 SYRINGE (ML) INJECTION EVERY 12 HOURS SCHEDULED
Status: DISCONTINUED | OUTPATIENT
Start: 2021-10-28 | End: 2021-10-28 | Stop reason: HOSPADM

## 2021-10-28 RX ORDER — SODIUM CHLORIDE 9 MG/ML
3 INJECTION, SOLUTION INTRAVENOUS CONTINUOUS
Status: DISCONTINUED | OUTPATIENT
Start: 2021-10-28 | End: 2021-10-28

## 2021-10-28 RX ORDER — ASPIRIN 81 MG/1
81 TABLET ORAL DAILY
Status: DISCONTINUED | OUTPATIENT
Start: 2021-10-29 | End: 2021-10-28 | Stop reason: HOSPADM

## 2021-10-28 RX ORDER — ACETAMINOPHEN 325 MG/1
650 TABLET ORAL EVERY 4 HOURS PRN
Status: DISCONTINUED | OUTPATIENT
Start: 2021-10-28 | End: 2021-10-28 | Stop reason: HOSPADM

## 2021-10-28 RX ORDER — PHENYLEPHRINE HCL IN 0.9% NACL 0.5 MG/5ML
SYRINGE (ML) INTRAVENOUS AS NEEDED
Status: DISCONTINUED | OUTPATIENT
Start: 2021-10-28 | End: 2021-10-28 | Stop reason: HOSPADM

## 2021-10-28 RX ORDER — SODIUM CHLORIDE 0.9 % (FLUSH) 0.9 %
10 SYRINGE (ML) INJECTION AS NEEDED
Status: DISCONTINUED | OUTPATIENT
Start: 2021-10-28 | End: 2021-10-28 | Stop reason: HOSPADM

## 2021-10-28 RX ORDER — LIDOCAINE HYDROCHLORIDE 10 MG/ML
INJECTION, SOLUTION EPIDURAL; INFILTRATION; INTRACAUDAL; PERINEURAL AS NEEDED
Status: DISCONTINUED | OUTPATIENT
Start: 2021-10-28 | End: 2021-10-28 | Stop reason: HOSPADM

## 2021-10-28 RX ORDER — HEPARIN SODIUM 1000 [USP'U]/ML
INJECTION, SOLUTION INTRAVENOUS; SUBCUTANEOUS AS NEEDED
Status: DISCONTINUED | OUTPATIENT
Start: 2021-10-28 | End: 2021-10-28 | Stop reason: HOSPADM

## 2021-10-28 RX ORDER — ASPIRIN 81 MG/1
324 TABLET, CHEWABLE ORAL ONCE
Status: DISCONTINUED | OUTPATIENT
Start: 2021-10-28 | End: 2021-10-28 | Stop reason: HOSPADM

## 2021-10-28 RX ORDER — MIDAZOLAM HYDROCHLORIDE 1 MG/ML
INJECTION INTRAMUSCULAR; INTRAVENOUS AS NEEDED
Status: DISCONTINUED | OUTPATIENT
Start: 2021-10-28 | End: 2021-10-28 | Stop reason: HOSPADM

## 2021-10-28 RX ORDER — CLOPIDOGREL BISULFATE 75 MG/1
75 TABLET ORAL DAILY
Qty: 90 TABLET | Refills: 3 | Status: SHIPPED | OUTPATIENT
Start: 2021-10-28 | End: 2021-10-29 | Stop reason: SDUPTHER

## 2021-10-28 RX ORDER — FENTANYL CITRATE 50 UG/ML
INJECTION, SOLUTION INTRAMUSCULAR; INTRAVENOUS AS NEEDED
Status: DISCONTINUED | OUTPATIENT
Start: 2021-10-28 | End: 2021-10-28 | Stop reason: HOSPADM

## 2021-10-28 RX ADMIN — CLOPIDOGREL BISULFATE 600 MG: 75 TABLET ORAL at 12:10

## 2021-10-28 RX ADMIN — SODIUM CHLORIDE 50 ML/HR: 9 INJECTION, SOLUTION INTRAVENOUS at 11:04

## 2021-10-29 ENCOUNTER — DOCUMENTATION (OUTPATIENT)
Dept: CARDIAC REHAB | Facility: HOSPITAL | Age: 56
End: 2021-10-29

## 2021-10-29 ENCOUNTER — CALL CENTER PROGRAMS (OUTPATIENT)
Dept: CALL CENTER | Facility: HOSPITAL | Age: 56
End: 2021-10-29

## 2021-10-29 RX ORDER — ATORVASTATIN CALCIUM 80 MG/1
80 TABLET, FILM COATED ORAL NIGHTLY
Qty: 90 TABLET | Refills: 3 | Status: SHIPPED | OUTPATIENT
Start: 2021-10-29

## 2021-10-29 RX ORDER — CLOPIDOGREL BISULFATE 75 MG/1
75 TABLET ORAL DAILY
Qty: 90 TABLET | Refills: 3 | Status: SHIPPED | OUTPATIENT
Start: 2021-10-29 | End: 2022-11-02

## 2021-10-29 RX ORDER — CARVEDILOL 6.25 MG/1
6.25 TABLET ORAL 2 TIMES DAILY WITH MEALS
Qty: 180 TABLET | Refills: 3 | Status: SHIPPED | OUTPATIENT
Start: 2021-10-29 | End: 2022-02-22 | Stop reason: SDUPTHER

## 2021-10-29 RX ORDER — SPIRONOLACTONE 25 MG/1
25 TABLET ORAL DAILY
Qty: 90 TABLET | Refills: 3 | Status: SHIPPED | OUTPATIENT
Start: 2021-10-29

## 2021-10-29 NOTE — OUTREACH NOTE
PCI/Device Survey      Responses   Facility patient discharged from? [place]   Procedure date 10/28/21   Procedure (if device, specify in description) PCI   PCI site Right,  Arm   Performing MD Dr. Wisam Ledesma   Attempt successful? Yes   Call start time 1118   Call end time 1125   Has the patient had any of the following symptoms since discharge? --  [Denies any symptoms. ]   Is the patient taking prescribed medications: ASA,  Plavix  [Eliquis]   Nursing intervention Reminded to continue to take prescribed medications   Medication comments patient holding metformin until 10/30/21 as instructed.    Does the patient have any of the following symptoms related to the cath/surgical site? --  [Dressing remains clean and intact to wrist. ]   Does the patient have an appointment scheduled with the cardiologist? Yes   Appointment comments Follow Up with Fabian Arnold MD Tuesday Nov 16, 2021 11:15 AM   If the patient is a current smoker, are they able to teach back resources for cessation? Not a smoker   Did the patient feel prepared to go home on the same day as the procedure? Yes   Is the patient satisfied with the same day discharge process? Yes   PCI/Device call completed Yes   Wrap up additional comments Denies further questions or concerns today.           Roxanna García RN

## 2021-11-01 LAB
QT INTERVAL: 406 MS
QTC INTERVAL: 474 MS

## 2021-11-10 ENCOUNTER — DOCUMENTATION (OUTPATIENT)
Dept: CARDIAC REHAB | Facility: HOSPITAL | Age: 56
End: 2021-11-10

## 2021-11-10 NOTE — PROGRESS NOTES
Cardiac Rehab is still on Covid 19 restrictions. We are calling patients as we get spaces open . Sorry for the inconvenience  .      Called to speak with patient concerning an opening in cardiac rehab.   I spoke with his spouse and she stated Dr Resendez told him he doesn't need to do cardiac rehab. She stated they are to see him again Tuesday the 16th and if he has changed his mind they will stop by here after the appointment to schedule.

## 2021-11-12 ENCOUNTER — DOCUMENTATION (OUTPATIENT)
Dept: CARDIAC REHAB | Facility: HOSPITAL | Age: 56
End: 2021-11-12

## 2021-11-12 NOTE — PROGRESS NOTES
Cardiac Rehab staff mailed referral letter to patient regarding Phase II Cardiac Rehab program. Instruction for patient to contact [facility] Cardiac Rehab Department for additional program information and to forward referral to closest facility.

## 2021-11-16 ENCOUNTER — OFFICE VISIT (OUTPATIENT)
Dept: CARDIOLOGY | Facility: CLINIC | Age: 56
End: 2021-11-16

## 2021-11-16 VITALS
BODY MASS INDEX: 34.45 KG/M2 | HEART RATE: 85 BPM | HEIGHT: 69 IN | WEIGHT: 232.6 LBS | OXYGEN SATURATION: 98 % | DIASTOLIC BLOOD PRESSURE: 81 MMHG | SYSTOLIC BLOOD PRESSURE: 123 MMHG

## 2021-11-16 DIAGNOSIS — I25.10 ASCVD (ARTERIOSCLEROTIC CARDIOVASCULAR DISEASE): ICD-10-CM

## 2021-11-16 DIAGNOSIS — I10 PRIMARY HYPERTENSION: ICD-10-CM

## 2021-11-16 DIAGNOSIS — E78.5 HYPERLIPIDEMIA, UNSPECIFIED HYPERLIPIDEMIA TYPE: ICD-10-CM

## 2021-11-16 DIAGNOSIS — I42.0 CARDIOMYOPATHY, DILATED (HCC): Primary | ICD-10-CM

## 2021-11-16 PROCEDURE — 99214 OFFICE O/P EST MOD 30 MIN: CPT | Performed by: INTERNAL MEDICINE

## 2021-11-16 RX ORDER — CEFDINIR 300 MG/1
300 CAPSULE ORAL 2 TIMES DAILY
Status: ON HOLD | COMMUNITY
End: 2022-01-14

## 2021-11-16 RX ORDER — DOXYCYCLINE HYCLATE 100 MG/1
100 CAPSULE ORAL 2 TIMES DAILY
Status: ON HOLD | COMMUNITY
End: 2022-01-14

## 2021-11-16 RX ORDER — SACUBITRIL AND VALSARTAN 49; 51 MG/1; MG/1
1 TABLET, FILM COATED ORAL 2 TIMES DAILY
Qty: 60 TABLET | Refills: 3 | Status: SHIPPED | OUTPATIENT
Start: 2021-11-16

## 2021-11-19 PROBLEM — I25.10 ASCVD (ARTERIOSCLEROTIC CARDIOVASCULAR DISEASE): Status: ACTIVE | Noted: 2021-11-19

## 2021-12-07 ENCOUNTER — HOSPITAL ENCOUNTER (OUTPATIENT)
Dept: CARDIOLOGY | Facility: HOSPITAL | Age: 56
Discharge: HOME | End: 2021-12-07
Admitting: INTERNAL MEDICINE

## 2021-12-07 PROCEDURE — 93306 TTE W/DOPPLER COMPLETE: CPT

## 2021-12-07 PROCEDURE — 93306 TTE W/DOPPLER COMPLETE: CPT | Performed by: INTERNAL MEDICINE

## 2021-12-09 ENCOUNTER — TELEPHONE (OUTPATIENT)
Dept: CARDIOLOGY | Facility: CLINIC | Age: 56
End: 2021-12-09

## 2021-12-09 DIAGNOSIS — I25.10 ASCVD (ARTERIOSCLEROTIC CARDIOVASCULAR DISEASE): Primary | ICD-10-CM

## 2021-12-09 LAB
BH CV ECHO MEAS - % IVS THICK: 9.9 %
BH CV ECHO MEAS - % LVPW THICK: -2.4 %
BH CV ECHO MEAS - ACS: 2.3 CM
BH CV ECHO MEAS - AO MAX PG: 6 MMHG
BH CV ECHO MEAS - AO MEAN PG: 3 MMHG
BH CV ECHO MEAS - AO ROOT AREA (BSA CORRECTED): 1.5
BH CV ECHO MEAS - AO ROOT AREA: 8 CM^2
BH CV ECHO MEAS - AO ROOT DIAM: 3.2 CM
BH CV ECHO MEAS - AO V2 MAX: 122 CM/SEC
BH CV ECHO MEAS - AO V2 MEAN: 78.5 CM/SEC
BH CV ECHO MEAS - AO V2 VTI: 24.2 CM
BH CV ECHO MEAS - BSA(HAYCOCK): 2.3 M^2
BH CV ECHO MEAS - BSA: 2.2 M^2
BH CV ECHO MEAS - BZI_BMI: 34.3 KILOGRAMS/M^2
BH CV ECHO MEAS - BZI_METRIC_HEIGHT: 175.3 CM
BH CV ECHO MEAS - BZI_METRIC_WEIGHT: 105.2 KG
BH CV ECHO MEAS - EDV(CUBED): 354.9 ML
BH CV ECHO MEAS - EDV(MOD-SP4): 175 ML
BH CV ECHO MEAS - EDV(TEICH): 262.1 ML
BH CV ECHO MEAS - EF(CUBED): 25.1 %
BH CV ECHO MEAS - EF(MOD-SP4): 28.6 %
BH CV ECHO MEAS - EF(TEICH): 19.6 %
BH CV ECHO MEAS - ESV(CUBED): 265.8 ML
BH CV ECHO MEAS - ESV(MOD-SP4): 125 ML
BH CV ECHO MEAS - ESV(TEICH): 210.8 ML
BH CV ECHO MEAS - FS: 9.2 %
BH CV ECHO MEAS - IVS/LVPW: 0.63
BH CV ECHO MEAS - IVSD: 0.79 CM
BH CV ECHO MEAS - IVSS: 0.87 CM
BH CV ECHO MEAS - LA DIMENSION: 3 CM
BH CV ECHO MEAS - LA/AO: 0.94
BH CV ECHO MEAS - LV DIASTOLIC VOL/BSA (35-75): 79.5 ML/M^2
BH CV ECHO MEAS - LV MASS(C)D: 336.5 GRAMS
BH CV ECHO MEAS - LV MASS(C)DI: 153 GRAMS/M^2
BH CV ECHO MEAS - LV MASS(C)S: 293.8 GRAMS
BH CV ECHO MEAS - LV MASS(C)SI: 133.5 GRAMS/M^2
BH CV ECHO MEAS - LV SYSTOLIC VOL/BSA (12-30): 56.8 ML/M^2
BH CV ECHO MEAS - LVIDD: 7.1 CM
BH CV ECHO MEAS - LVIDS: 6.4 CM
BH CV ECHO MEAS - LVLD AP4: 8.4 CM
BH CV ECHO MEAS - LVLS AP4: 8.1 CM
BH CV ECHO MEAS - LVOT AREA (M): 4.5 CM^2
BH CV ECHO MEAS - LVOT AREA: 4.5 CM^2
BH CV ECHO MEAS - LVOT DIAM: 2.4 CM
BH CV ECHO MEAS - LVPWD: 1.2 CM
BH CV ECHO MEAS - LVPWS: 1.2 CM
BH CV ECHO MEAS - PA ACC TIME: 0.11 SEC
BH CV ECHO MEAS - PA PR(ACCEL): 31.3 MMHG
BH CV ECHO MEAS - SI(AO): 88.5 ML/M^2
BH CV ECHO MEAS - SI(CUBED): 40.5 ML/M^2
BH CV ECHO MEAS - SI(MOD-SP4): 22.7 ML/M^2
BH CV ECHO MEAS - SI(TEICH): 23.3 ML/M^2
BH CV ECHO MEAS - SV(AO): 194.6 ML
BH CV ECHO MEAS - SV(CUBED): 89 ML
BH CV ECHO MEAS - SV(MOD-SP4): 50 ML
BH CV ECHO MEAS - SV(TEICH): 51.3 ML
MAXIMAL PREDICTED HEART RATE: 166 BPM
STRESS TARGET HR: 141 BPM

## 2021-12-09 NOTE — TELEPHONE ENCOUNTER
Spoke with patient, per Dr. Resendez, I will put in a referral to [facility], and patient is to remain off work til at least next appointment due to wearing lifevest

## 2022-01-03 ENCOUNTER — OFFICE VISIT (OUTPATIENT)
Dept: CARDIOLOGY | Facility: CLINIC | Age: 57
End: 2022-01-03

## 2022-01-03 VITALS
OXYGEN SATURATION: 97 % | WEIGHT: 229 LBS | DIASTOLIC BLOOD PRESSURE: 64 MMHG | SYSTOLIC BLOOD PRESSURE: 96 MMHG | BODY MASS INDEX: 33.92 KG/M2 | HEIGHT: 69 IN | HEART RATE: 83 BPM

## 2022-01-03 DIAGNOSIS — I10 PRIMARY HYPERTENSION: ICD-10-CM

## 2022-01-03 DIAGNOSIS — I63.9 OCCIPITAL INFARCTION: ICD-10-CM

## 2022-01-03 DIAGNOSIS — I50.42 CHRONIC COMBINED SYSTOLIC AND DIASTOLIC CONGESTIVE HEART FAILURE: ICD-10-CM

## 2022-01-03 DIAGNOSIS — I42.0 CARDIOMYOPATHY, DILATED: Primary | ICD-10-CM

## 2022-01-03 DIAGNOSIS — I42.0 DILATED CARDIOMYOPATHY: ICD-10-CM

## 2022-01-03 PROCEDURE — 99204 OFFICE O/P NEW MOD 45 MIN: CPT | Performed by: INTERNAL MEDICINE

## 2022-01-03 PROCEDURE — 93000 ELECTROCARDIOGRAM COMPLETE: CPT | Performed by: INTERNAL MEDICINE

## 2022-01-03 NOTE — H&P (VIEW-ONLY)
Cardiac Electrophysiology Outpatient Consult Note            [facility]    Consult Note     Cas Mccabe  [id number]  01/03/2022  [phone]     Primary Care Physician: Justa Ruiz APRN    Referred By: Fabian Arnold*    Subjective     Chief Complaint:   Diagnoses and all orders for this visit:    1. Cardiomyopathy, dilated (HCC) (Primary)  -     ECG 12 Lead  -     Case Request EP Lab: SICD ([facility]), hold Eliquis 1 day    2. Primary hypertension    3. Chronic combined systolic and diastolic congestive heart failure (HCC)    4. Occipital infarction (HCC)      Chief Complaint   Patient presents with   • Cardiomyopathy, dilated (HCC)       History of Present Illness:   Cas Mccabe is a 56 y.o. male who presents to my electrophysiology clinic for evaluation of persistent cardiomyopathy.  He was originally diagnosed with cardiomyopathy in 2014.  Echocardiogram at that time showed an ejection fraction of 30-35%.  He underwent cardiac catheterization revealing a single 50% LAD lesion which was treated medically.  He had a follow-up echocardiogram 6 months later which showed improvement of ejection fraction to 45-50%.  He did well until October 2021.  He states his primary care had taken him off of heart failure medications.  His wife took him to [facility] because of coughing.  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  He was started on Eliquis for presumed occult atrial fibrillation.  Echocardiogram showed ejection fraction of 20 to 25%.  He underwent cardiac catheterization which showed an 80% LAD stenosis.  He was transferred to [facility] where he underwent catheter-based intervention to the LAD.  He is referred to our service to consider ICD.  He currently has no complaint of exertional chest pain or dyspnea, denies orthopnea, PND,  claudication, lower extremity edema.  He has no awareness of tachyarrhythmias, he denies dizziness or syncope.  He states his blood pressures at home typically run about 120 mmHg systolic.  He was recently seen by his primary cardiologist who increased Entresto to the current dose which has been well-tolerated.  He denies a history of daytime sleepiness.  His wife states he only occasionally snores.  He is compliant with his home medication regimen and reports no adverse side effects.    Past Medical History:   Patient Active Problem List    Diagnosis Date Noted   • Cardiomyopathy, dilated (HCC) 07/07/2017     Priority: High     Note Last Updated: 1/3/2022     Echo 2-: EF 30-35%.  Left heart catheterization showing 50% LAD lesion  Echo 12-: Left ventricular diastolic dysfunction (grade II) consistent with pseudonormalization. Calculated EF = 30%. Estimated EF was in agreement with the calculated EF. Normal left ventricular wall thickness noted.  Echo 12-7-21: Left ventricular ejection fraction appears to be 26 - 30%. Left ventricular systolic function is severely decreased. The left ventricular cavity is moderately dilated.     • ASCVD (arteriosclerotic cardiovascular disease) 11/19/2021     Priority: Medium     Note Last Updated: 1/3/2022     [facility] 10-28-21: The 80% ostial LAD stenosis is status post intervention with a Xience Florecita 4.0 x 8 mm drug-eluting stent     • Primary hypertension 01/03/2022     Priority: Low   • Hyperlipidemia 07/07/2017     Priority: Low   • Occipital infarction ([facility]) 10/05/2021   • Pain in joint of left shoulder 07/07/2017   • Type 2 diabetes mellitus ([facility]) 07/07/2017       Past Surgical History:   Past Surgical History:   Procedure Laterality Date   • CARDIAC CATHETERIZATION      no stents   • CARDIAC CATHETERIZATION N/A 10/19/2021    Procedure: Left Heart Cath;  Surgeon: Fabian Arnold MD;  Location: [facility] CATH INVASIVE LOCATION;  Service: Cardiology;   Laterality: N/A;   • CARDIAC CATHETERIZATION N/A 10/28/2021    Procedure: Coronary angiography;  Surgeon: Wisam Ledesma MD;  Location: [facility] at [facility] CATH INVASIVE LOCATION;  Service: Cardiology;  Laterality: N/A;   • CAROTID STENT  oct 2021   • CORONARY STENT PLACEMENT         Social History:   Social History     Socioeconomic History   • Marital status:    Tobacco Use   • Smoking status: Never Smoker   • Smokeless tobacco: Never Used   Substance and Sexual Activity   • Alcohol use: No   • Drug use: No   • Sexual activity: Yes     Partners: Female     Birth control/protection: None       Medications:     Current Outpatient Medications:   •  apixaban (ELIQUIS) 5 MG tablet tablet, Take 1 tablet by mouth Every 12 (Twelve) Hours. Indications: Other - full anticoagulation, Disp: 180 tablet, Rfl: 3  •  atorvastatin (LIPITOR) 80 MG tablet, Take 1 tablet by mouth Every Night., Disp: 90 tablet, Rfl: 3  •  carvedilol (COREG) 6.25 MG tablet, Take 1 tablet by mouth 2 (Two) Times a Day With Meals., Disp: 180 tablet, Rfl: 3  •  clopidogrel (PLAVIX) 75 MG tablet, Take 1 tablet by mouth Daily., Disp: 90 tablet, Rfl: 3  •  empagliflozin (Jardiance) 10 MG tablet tablet, Take 1 tablet by mouth Daily., Disp: 30 tablet, Rfl: 0  •  glipizide (GLUCOTROL) 10 MG tablet, Take 10 mg by mouth 2 (Two) Times a Day Before Meals., Disp: , Rfl:   •  metFORMIN (GLUCOPHAGE) 1000 MG tablet, Take 1,000 mg by mouth 2 (Two) Times a Day With Meals., Disp: , Rfl:   •  sacubitril-valsartan (Entresto) 49-51 MG tablet, Take 1 tablet by mouth 2 (Two) Times a Day., Disp: 60 tablet, Rfl: 3  •  spironolactone (ALDACTONE) 25 MG tablet, Take 1 tablet by mouth Daily., Disp: 90 tablet, Rfl: 3  •  cefdinir (OMNICEF) 300 MG capsule, Take 300 mg by mouth 2 (Two) Times a Day., Disp: , Rfl:   •  doxycycline (VIBRAMYCIN) 100 MG capsule, Take 100 mg by mouth 2 (Two) Times a Day., Disp: , Rfl:   •  guaiFENesin (MUCINEX) 600 MG 12 hr tablet, Take 1,200 mg by mouth 2 (Two)  "Times a Day., Disp: , Rfl:     Allergies:   No Known Allergies    Objective   Vital Signs:   Vitals:    01/03/22 1037   BP: 96/64   BP Location: Left arm   Patient Position: Sitting   Cuff Size: Large Adult   Pulse: 83   SpO2: 97%   Weight: 104 kg (229 lb)   Height: 175.3 cm (69\")       PHYSICAL EXAM  General appearance: Awake, alert, cooperative  Head: Normocephalic, without obvious abnormality, atraumatic  Eyes: Conjunctivae/corneas clear, EOMs intact  Neck: no adenopathy, no carotid bruit, no JVD and thyroid: not enlarged  Lungs: clear to auscultation bilaterally and no rhonchi or crackles\", ' symmetric  Heart: regular rate and rhythm, S1, S2 normal, no murmur, click, rub or gallop  Abdomen: Soft, non-tender, bowel sounds normal,  no organomegaly  Extremities: extremities normal, atraumatic, no cyanosis or edema  Skin: Skin color, turgor normal, no rashes or lesions  Neurologic: Grossly normal     Lab Results   Component Value Date    GLUCOSE 194 (H) 10/28/2021    CALCIUM 9.7 10/28/2021     10/28/2021    K 4.7 10/28/2021    CO2 24.0 10/28/2021    CL 98 10/28/2021    BUN 23 (H) 10/28/2021    CREATININE 0.84 10/28/2021    EGFRIFNONA 95 10/28/2021    BCR 27.4 (H) 10/28/2021    ANIONGAP 14.0 10/28/2021     Lab Results   Component Value Date    WBC 9.02 10/28/2021    HGB 14.6 10/28/2021    HCT 43.3 10/28/2021    MCV 86.4 10/28/2021     10/28/2021     Lab Results   Component Value Date    INR 0.90 10/05/2021    INR 0.90 12/19/2018    PROTIME 12.6 (L) 10/05/2021    PROTIME 12.4 12/19/2018     Lab Results   Component Value Date    TSH 0.811 12/20/2018    F0YLFMV 7.8 01/31/2014       Cardiac Testing:      I personally viewed and interpreted the patient's EKG/Telemetry/lab data      ECG 12 Lead    Date/Time: 1/3/2022 8:32 AM  Performed by: Shadi Larsen DO  Authorized by: Shadi Larsen DO   Previous ECG: no previous ECG available  Rhythm: sinus rhythm  Rate: normal  BPM: 83  Conduction: left anterior " fascicular block  ST Segments: ST segments normal  T Waves: T waves normal  T elevation: III, V5, V6 and aVF  QRS axis: normal  Other: no other findings    Clinical impression: abnormal EKG          CHADS-VASc Risk Assessment            5 Total Score    1 DM    2 PRIOR STROKE/TIA/THROMBO                 1      CHF               1       Vascular Disease    Criteria that do not apply:        Hypertension    Age >/= 75        Age 65-74    Sex: Female          Tobacco Cessation: N/A  Obstructive Sleep Apnea Screening: Completed    Assessment & Plan    Diagnoses and all orders for this visit:    1. Cardiomyopathy, dilated (HCC) (Primary)  -     ECG 12 Lead  -     Case Request EP Lab: [facility] ([facility]), hold Eliquis 1 day    2. Primary hypertension    3. Chronic combined systolic and diastolic congestive heart failure (HCC)    4. Occipital infarction (HCC)         Diagnosis Plan   1. Cardiomyopathy, dilated (HCC)   New York Heart Association functional class III.    Stable.  Euvolemic.  On good medical therapy for quite some time now.    Here to discuss the option of an ICD.  Patient is interested in proceeding with ICD implantation.  He is done some reading.  He is here with his wife.  They understand what this device is.    We discussed the option of a transvenous ICD.  We discussed the option of a totally subcutaneous ICD system.  We discussed the risk-benefit profile of both approaches.  In my opinion a subcutaneous ICD would be favorable as it would confer lower risk in the short-term as well as long-term.  He has no indication for pacing.  He has a narrow QRS.  He has no history of bradycardia.  There is no reason for him to have an endovascular lead.    Risk-benefit profile of S ICD jia at length.      Please especially note that the patient has been on maximally tolerated doses of guideline directed medical therapy, including but not limited to: HF indicated beta-blocker (carvedilol, metoprolol succinate), ACE Inhibitor  or Angiotensin receptor blocker.  Please note that for some of these medications the maximally tolerated dose may be zero.  The patient has not had a myocardial infarction within 40 days and has not had coronary revascularization within 90 days.        This patient who is a candidate for an ICD has a life expectancy greater than 12 months.    The patient and I made a mutually shared decision ( shared decision making model ) that the patient would be best served by proceeding with the above invasive heart procedure.  This is a high risk invasive cardiac procedure which comes with significant risk of morbidity and mortality.  This patient has significant underlying  heart disease.  Cas Craft Eliot understands these risks and   has made an informed decision to proceed.         2. Primary hypertension   blood pressure well controlled.  Doing nicely.   3. Chronic combined systolic and diastolic congestive heart failure (HCC)   due to nonischemic process.  Carvedilol.  Entresto.   4. Occipital infarction (HCC)   occult A. fib cannot be ruled out.  Empirically on apixaban.  Tolerating this well.  Prescribed by neurology.    This patient certainly has risk factors for atrial fibrillation.  The S ICD will be helpful in screening for atrial fibrillation additionally to his prevention of sudden cardiac arrest.     Body mass index is 33.82 kg/m².    I spent 48 minutes in consultation with this patient which included more than 65% of this time in direct face-to-face counseling, physical examination and discussion of my assessment and findings and shared decision making with the patient.  The remainder of the time not spent face to face was performing one, some or all of the following actions:  preparing to see this patient ( eg. Review of tests),  ordering medications, tests or procedures ), care coordination, discussion of the plan with other healthcare providers, documenting clinical information in Epic well as  independently interpreting results and communicating results to patient, family and or caregiver.  All time noted occurred on the date of service.    Follow Up:       Thank you for allowing me to participate in the care of your patient. Please to not hesitate to contact me with additional questions or concerns.      Shadi Larsen DO, [facility], [facility]  Cardiac Electrophysiologist  [facility] / [facility]    Scribed for Shadi Larsen DO Electronically signed by LOLI Esquivel, 01/03/22, 11:32 AM EST.

## 2022-01-03 NOTE — PROGRESS NOTES
Cardiac Electrophysiology Outpatient Consult Note            [facility] at [facility]    Consult Note     Cas Mccabe  [id number]  01/03/2022  [phone]     Primary Care Physician: Justa Ruiz APRN    Referred By: Fabian Arnold*    Subjective     Chief Complaint:   Diagnoses and all orders for this visit:    1. Cardiomyopathy, dilated (HCC) (Primary)  -     ECG 12 Lead  -     Case [place] [place] 1 [place]    2. Primary hypertension    3. Chronic combined systolic and diastolic congestive heart failure (HCC)    4. Occipital infarction (HCC)      Chief Complaint   Patient presents with   • Cardiomyopathy, dilated (HCC)       History of Present Illness:   Cas Mccabe is a 56 y.o. male who presents to my electrophysiology clinic for evaluation of persistent cardiomyopathy.  He was originally diagnosed with cardiomyopathy in 2014.  Echocardiogram at that time showed an ejection fraction of 30-35%.  He underwent cardiac catheterization revealing a single 50% LAD lesion which was treated medically.  He had a follow-up echocardiogram 6 months later which showed improvement of ejection fraction to 45-50%.  He did well until October 2021.  He states his primary care had taken him off of heart failure medications.  His wife took him to [facility] because of coughing.  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  He was started on Eliquis for presumed occult atrial fibrillation.  Echocardiogram showed ejection fraction of 20 to 25%.  He underwent cardiac catheterization which showed an 80% LAD stenosis.  He was transferred to [facility] where he underwent catheter-based intervention to the LAD.  He is referred to our service to consider ICD.  He currently has no complaint of exertional chest pain or dyspnea, denies orthopnea, PND,  claudication, lower extremity edema.  He has no awareness of tachyarrhythmias, he denies dizziness or syncope.  He states his blood pressures at home typically run about 120 mmHg systolic.  He was recently seen by his primary cardiologist who increased Entresto to the current dose which has been well-tolerated.  He denies a history of daytime sleepiness.  His wife states he only occasionally snores.  He is compliant with his home medication regimen and reports no adverse side effects.    Past Medical History:   Patient Active Problem List    Diagnosis Date Noted   • Cardiomyopathy, dilated (HCC) 07/07/2017     Priority: High     Note Last Updated: 1/3/2022     · Echo 2-: EF 30-35%.  Left heart catheterization showing 50% LAD lesion  · Echo 12-: Left ventricular diastolic dysfunction (grade II) consistent with pseudonormalization. Calculated EF = 30%. Estimated EF was in agreement with the calculated EF. Normal left ventricular wall thickness noted.  · Echo 12-7-21: Left ventricular ejection fraction appears to be 26 - 30%. Left ventricular systolic function is severely decreased. The left ventricular cavity is moderately dilated.     • ASCVD (arteriosclerotic cardiovascular disease) 11/19/2021     Priority: Medium     Note Last Updated: 1/3/2022     · [facility] 10-28-21: The 80% ostial LAD stenosis is status post intervention with a Xience Florecita 4.0 x 8 mm drug-eluting stent     • Primary hypertension 01/03/2022     Priority: Low   • Hyperlipidemia 07/07/2017     Priority: Low   • Occipital infarction (HCC) 10/05/2021   • Pain in joint of left shoulder 07/07/2017   • Type 2 diabetes mellitus ([facility]) 07/07/2017       Past Surgical History:   Past Surgical History:   Procedure Laterality Date   • CARDIAC CATHETERIZATION      no stents   • CARDIAC CATHETERIZATION N/A 10/19/2021    Procedure: Left Heart Cath;  Surgeon: Fabian Arnold MD;  [facility];  Service:  Cardiology;  Laterality: N/A;   • CARDIAC CATHETERIZATION N/A 10/28/2021    Procedure: Coronary angiography;  Surgeon: Wisam Ledesma MD;  Location: [facility] CATH INVASIVE LOCATION;  Service: Cardiology;  Laterality: N/A;   • CAROTID STENT  oct 2021   • CORONARY STENT PLACEMENT         Social History:   Social History     Socioeconomic History   • Marital status:    Tobacco Use   • Smoking status: Never Smoker   • Smokeless tobacco: Never Used   Substance and Sexual Activity   • Alcohol use: No   • Drug use: No   • Sexual activity: Yes     Partners: Female     Birth control/protection: None       Medications:     Current Outpatient Medications:   •  apixaban (ELIQUIS) 5 MG tablet tablet, Take 1 tablet by mouth Every 12 (Twelve) Hours. Indications: Other - full anticoagulation, Disp: 180 tablet, Rfl: 3  •  atorvastatin (LIPITOR) 80 MG tablet, Take 1 tablet by mouth Every Night., Disp: 90 tablet, Rfl: 3  •  carvedilol (COREG) 6.25 MG tablet, Take 1 tablet by mouth 2 (Two) Times a Day With Meals., Disp: 180 tablet, Rfl: 3  •  clopidogrel (PLAVIX) 75 MG tablet, Take 1 tablet by mouth Daily., Disp: 90 tablet, Rfl: 3  •  empagliflozin (Jardiance) 10 MG tablet tablet, Take 1 tablet by mouth Daily., Disp: 30 tablet, Rfl: 0  •  glipizide (GLUCOTROL) 10 MG tablet, Take 10 mg by mouth 2 (Two) Times a Day Before Meals., Disp: , Rfl:   •  metFORMIN (GLUCOPHAGE) 1000 MG tablet, Take 1,000 mg by mouth 2 (Two) Times a Day With Meals., Disp: , Rfl:   •  sacubitril-valsartan (Entresto) 49-51 MG tablet, Take 1 tablet by mouth 2 (Two) Times a Day., Disp: 60 tablet, Rfl: 3  •  spironolactone (ALDACTONE) 25 MG tablet, Take 1 tablet by mouth Daily., Disp: 90 tablet, Rfl: 3  •  cefdinir (OMNICEF) 300 MG capsule, Take 300 mg by mouth 2 (Two) Times a Day., Disp: , Rfl:   •  doxycycline (VIBRAMYCIN) 100 MG capsule, Take 100 mg by mouth 2 (Two) Times a Day., Disp: , Rfl:   •  guaiFENesin (MUCINEX) 600 MG 12 hr tablet, Take 1,200 mg by  "mouth 2 (Two) Times a Day., Disp: , Rfl:     Allergies:   No Known Allergies    Objective   Vital Signs:   Vitals:    01/03/22 1037   BP: 96/64   BP Location: Left arm   Patient Position: Sitting   Cuff Size: Large Adult   Pulse: 83   SpO2: 97%   Weight: 104 kg (229 lb)   Height: 175.3 cm (69\")       PHYSICAL EXAM  General appearance: Awake, alert, cooperative  Head: Normocephalic, without obvious abnormality, atraumatic  Eyes: Conjunctivae/corneas clear, EOMs intact  Neck: no adenopathy, no carotid bruit, no JVD and thyroid: not enlarged  Lungs: clear to auscultation bilaterally and no rhonchi or crackles\", ' symmetric  Heart: regular rate and rhythm, S1, S2 normal, no murmur, click, rub or gallop  Abdomen: Soft, non-tender, bowel sounds normal,  no organomegaly  Extremities: extremities normal, atraumatic, no cyanosis or edema  Skin: Skin color, turgor normal, no rashes or lesions  Neurologic: Grossly normal     Lab Results   Component Value Date    GLUCOSE 194 (H) 10/28/2021    CALCIUM 9.7 10/28/2021     10/28/2021    K 4.7 10/28/2021    CO2 24.0 10/28/2021    CL 98 10/28/2021    BUN 23 (H) 10/28/2021    CREATININE 0.84 10/28/2021    EGFRIFNONA 95 10/28/2021    BCR 27.4 (H) 10/28/2021    ANIONGAP 14.0 10/28/2021     Lab Results   Component Value Date    WBC 9.02 10/28/2021    HGB 14.6 10/28/2021    HCT 43.3 10/28/2021    MCV 86.4 10/28/2021     10/28/2021     Lab Results   Component Value Date    INR 0.90 10/05/2021    INR 0.90 12/19/2018    PROTIME 12.6 (L) 10/05/2021    PROTIME 12.4 12/19/2018     Lab Results   Component Value Date    TSH 0.811 12/20/2018    D6ZBJTY 7.8 01/31/2014       Cardiac Testing:      I personally viewed and interpreted the patient's EKG/Telemetry/lab data      ECG 12 Lead    Date/Time: 1/3/2022 8:32 AM  Performed by: Shadi Larsen DO  Authorized by: Shadi Larsen DO   Previous ECG: no previous ECG available  Rhythm: sinus rhythm  Rate: normal  BPM: 83  Conduction: left " anterior fascicular block  ST Segments: ST segments normal  T Waves: T waves normal  T elevation: III, V5, V6 and aVF  QRS axis: normal  Other: no other findings    Clinical impression: abnormal EKG          CHADS-VASc Risk Assessment            5 Total Score    1 DM    2 PRIOR STROKE/TIA/THROMBO                 1      CHF               1       Vascular Disease    Criteria that do not apply:        Hypertension    Age >/= 75        Age 65-74    Sex: Female          Tobacco Cessation: N/A  Obstructive Sleep Apnea Screening: Completed    Assessment & Plan    Diagnoses and all orders for this visit:    1. Cardiomyopathy, dilated (HCC) (Primary)  -     ECG 12 Lead  -     Case Request EP Lab: SICD ([facility] [place]), hold Eliquis 1 day    2. Primary hypertension    3. Chronic combined systolic and diastolic congestive heart failure (HCC)    4. Occipital infarction (HCC)         Diagnosis Plan   1. Cardiomyopathy, dilated (HCC)   New York Heart Association functional class III.    Stable.  Euvolemic.  On good medical therapy for quite some time now.    Here to discuss the option of an ICD.  Patient is interested in proceeding with ICD implantation.  He is done some reading.  He is here with his wife.  They understand what this device is.    We discussed the option of a transvenous ICD.  We discussed the option of a totally subcutaneous ICD system.  We discussed the risk-benefit profile of both approaches.  In my opinion a subcutaneous ICD would be favorable as it would confer lower risk in the short-term as well as long-term.  He has no indication for pacing.  He has a narrow QRS.  He has no history of bradycardia.  There is no reason for him to have an endovascular lead.    Risk-benefit profile of S ICD jia at length.      Please especially note that the patient has been on maximally tolerated doses of guideline directed medical therapy, including but not limited to: HF indicated beta-blocker (carvedilol, metoprolol succinate), ACE  Inhibitor or Angiotensin receptor blocker.  Please note that for some of these medications the maximally tolerated dose may be zero.  The patient has not had a myocardial infarction within 40 days and has not had coronary revascularization within 90 days.        This patient who is a candidate for an ICD has a life expectancy greater than 12 months.    The patient and I made a mutually shared decision ( shared decision making model ) that the patient would be best served by proceeding with the above invasive heart procedure.  This is a high risk invasive cardiac procedure which comes with significant risk of morbidity and mortality.  This patient has significant underlying  heart disease.  Cas Mccabe understands these risks and   has made an informed decision to proceed.         2. Primary hypertension   blood pressure well controlled.  Doing nicely.   3. Chronic combined systolic and diastolic congestive heart failure (HCC)   due to nonischemic process.  Carvedilol.  Entresto.   4. Occipital infarction (HCC)   occult A. fib cannot be ruled out.  Empirically on apixaban.  Tolerating this well.  Prescribed by neurology.    This patient certainly has risk factors for atrial fibrillation.  The S ICD will be helpful in screening for atrial fibrillation additionally to his prevention of sudden cardiac arrest.     Body mass index is 33.82 kg/m².    I spent 48 minutes in consultation with this patient which included more than 65% of this time in direct face-to-face counseling, physical examination and discussion of my assessment and findings and shared decision making with the patient.  The remainder of the time not spent face to face was performing one, some or all of the following actions:  preparing to see this patient ( eg. Review of tests),  ordering medications, tests or procedures ), care coordination, discussion of the plan with other healthcare providers, documenting clinical information in Epic well as  independently interpreting results and communicating results to patient, family and or caregiver.  All time noted occurred on the date of service.    Follow Up:       Thank you for allowing me to participate in the care of your patient. Please to not hesitate to contact me with additional questions or concerns.      Shadi Larsen DO, [facility], [facility]  Cardiac Electrophysiologist  [facility] / [facility]    Scribed for Shadi aLrsen DO Electronically signed by LOLI Esquivel, 01/03/22, 11:32 AM EST.

## 2022-01-11 DIAGNOSIS — I42.0 CARDIOMYOPATHY, DILATED: Primary | ICD-10-CM

## 2022-01-14 ENCOUNTER — HOSPITAL ENCOUNTER (OUTPATIENT)
Facility: HOSPITAL | Age: 57
Discharge: HOME | End: 2022-01-14
Attending: INTERNAL MEDICINE | Admitting: INTERNAL MEDICINE

## 2022-01-14 ENCOUNTER — APPOINTMENT (OUTPATIENT)
Dept: GENERAL RADIOLOGY | Facility: HOSPITAL | Age: 57
End: 2022-01-14

## 2022-01-14 VITALS
HEART RATE: 80 BPM | DIASTOLIC BLOOD PRESSURE: 62 MMHG | WEIGHT: 231.92 LBS | OXYGEN SATURATION: 99 % | TEMPERATURE: 97.1 F | BODY MASS INDEX: 42.68 KG/M2 | SYSTOLIC BLOOD PRESSURE: 116 MMHG | RESPIRATION RATE: 16 BRPM | HEIGHT: 62 IN

## 2022-01-14 DIAGNOSIS — I42.0 CARDIOMYOPATHY, DILATED: ICD-10-CM

## 2022-01-14 LAB
ANION GAP SERPL CALCULATED.3IONS-SCNC: 10 MMOL/L (ref 5–15)
BASOPHILS # BLD AUTO: 0.08 10*3/MM3 (ref 0–0.2)
BASOPHILS NFR BLD AUTO: 1 % (ref 0–1.5)
BUN SERPL-MCNC: 20 MG/DL (ref 6–20)
BUN/CREAT SERPL: 26.3 (ref 7–25)
CALCIUM SPEC-SCNC: 9.8 MG/DL (ref 8.6–10.5)
CHLORIDE SERPL-SCNC: 101 MMOL/L (ref 98–107)
CO2 SERPL-SCNC: 26 MMOL/L (ref 22–29)
CREAT SERPL-MCNC: 0.76 MG/DL (ref 0.76–1.27)
DEPRECATED RDW RBC AUTO: 40 FL (ref 37–54)
EOSINOPHIL # BLD AUTO: 0.24 10*3/MM3 (ref 0–0.4)
EOSINOPHIL NFR BLD AUTO: 3.1 % (ref 0.3–6.2)
ERYTHROCYTE [DISTWIDTH] IN BLOOD BY AUTOMATED COUNT: 12.9 % (ref 12.3–15.4)
GFR SERPL CREATININE-BSD FRML MDRD: 107 ML/MIN/1.73
GLUCOSE SERPL-MCNC: 248 MG/DL (ref 65–99)
HCT VFR BLD AUTO: 46.7 % (ref 37.5–51)
HGB BLD-MCNC: 15.5 G/DL (ref 13–17.7)
IMM GRANULOCYTES # BLD AUTO: 0.01 10*3/MM3 (ref 0–0.05)
IMM GRANULOCYTES NFR BLD AUTO: 0.1 % (ref 0–0.5)
INR PPP: 0.91 (ref 0.85–1.16)
LYMPHOCYTES # BLD AUTO: 2.77 10*3/MM3 (ref 0.7–3.1)
LYMPHOCYTES NFR BLD AUTO: 35.5 % (ref 19.6–45.3)
MCH RBC QN AUTO: 28.7 PG (ref 26.6–33)
MCHC RBC AUTO-ENTMCNC: 33.2 G/DL (ref 31.5–35.7)
MCV RBC AUTO: 86.3 FL (ref 79–97)
MONOCYTES # BLD AUTO: 0.51 10*3/MM3 (ref 0.1–0.9)
MONOCYTES NFR BLD AUTO: 6.5 % (ref 5–12)
NEUTROPHILS NFR BLD AUTO: 4.19 10*3/MM3 (ref 1.7–7)
NEUTROPHILS NFR BLD AUTO: 53.8 % (ref 42.7–76)
NRBC BLD AUTO-RTO: 0 /100 WBC (ref 0–0.2)
PLATELET # BLD AUTO: 287 10*3/MM3 (ref 140–450)
PMV BLD AUTO: 9 FL (ref 6–12)
POTASSIUM SERPL-SCNC: 4.3 MMOL/L (ref 3.5–5.2)
PROTHROMBIN TIME: 12 SECONDS (ref 11.4–14.4)
RBC # BLD AUTO: 5.41 10*6/MM3 (ref 4.14–5.8)
SODIUM SERPL-SCNC: 137 MMOL/L (ref 136–145)
WBC NRBC COR # BLD: 7.8 10*3/MM3 (ref 3.4–10.8)

## 2022-01-14 PROCEDURE — 93010 ELECTROCARDIOGRAM REPORT: CPT | Performed by: INTERNAL MEDICINE

## 2022-01-14 PROCEDURE — 25010000002 ONDANSETRON PER 1 MG: Performed by: INTERNAL MEDICINE

## 2022-01-14 PROCEDURE — C1722 AICD, SINGLE CHAMBER: HCPCS | Performed by: INTERNAL MEDICINE

## 2022-01-14 PROCEDURE — 33274 TCAT INSJ/RPL PERM LDLS PM: CPT | Performed by: INTERNAL MEDICINE

## 2022-01-14 PROCEDURE — 85025 COMPLETE CBC W/AUTO DIFF WBC: CPT | Performed by: PHYSICIAN ASSISTANT

## 2022-01-14 PROCEDURE — 71046 X-RAY EXAM CHEST 2 VIEWS: CPT

## 2022-01-14 PROCEDURE — 99152 MOD SED SAME PHYS/QHP 5/>YRS: CPT | Performed by: INTERNAL MEDICINE

## 2022-01-14 PROCEDURE — 0 LIDOCAINE 1 % SOLUTION: Performed by: INTERNAL MEDICINE

## 2022-01-14 PROCEDURE — 25010000002 MIDAZOLAM PER 1 MG: Performed by: INTERNAL MEDICINE

## 2022-01-14 PROCEDURE — 93005 ELECTROCARDIOGRAM TRACING: CPT | Performed by: INTERNAL MEDICINE

## 2022-01-14 PROCEDURE — 80048 BASIC METABOLIC PNL TOTAL CA: CPT | Performed by: PHYSICIAN ASSISTANT

## 2022-01-14 PROCEDURE — 85610 PROTHROMBIN TIME: CPT | Performed by: PHYSICIAN ASSISTANT

## 2022-01-14 PROCEDURE — 25010000002 FENTANYL CITRATE (PF) 50 MCG/ML SOLUTION: Performed by: INTERNAL MEDICINE

## 2022-01-14 PROCEDURE — 99153 MOD SED SAME PHYS/QHP EA: CPT | Performed by: INTERNAL MEDICINE

## 2022-01-14 PROCEDURE — 33270 INS/REP SUBQ DEFIBRILLATOR: CPT | Performed by: INTERNAL MEDICINE

## 2022-01-14 PROCEDURE — 0 CEFAZOLIN IN DEXTROSE 2-4 GM/100ML-% SOLUTION: Performed by: PHYSICIAN ASSISTANT

## 2022-01-14 PROCEDURE — C1896 LEAD, AICD, NON SING/DUAL: HCPCS | Performed by: INTERNAL MEDICINE

## 2022-01-14 DEVICE — SUBCUTANEOUS IMPLANTABLE CARDIOVERTER DEFIBRILLATOR
Type: IMPLANTABLE DEVICE | Status: FUNCTIONAL
Brand: EMBLEM™ MRI S-ICD

## 2022-01-14 DEVICE — SUBCUTANEOUS ELECTRODE
Type: IMPLANTABLE DEVICE | Status: FUNCTIONAL
Brand: EMBLEM™ S-ICD

## 2022-01-14 RX ORDER — SODIUM CHLORIDE 9 MG/ML
INJECTION, SOLUTION INTRAVENOUS CONTINUOUS PRN
Status: COMPLETED | OUTPATIENT
Start: 2022-01-14 | End: 2022-01-14

## 2022-01-14 RX ORDER — ACETAMINOPHEN 325 MG/1
650 TABLET ORAL EVERY 6 HOURS
Status: DISCONTINUED | OUTPATIENT
Start: 2022-01-14 | End: 2022-01-14 | Stop reason: HOSPADM

## 2022-01-14 RX ORDER — FENTANYL CITRATE 50 UG/ML
INJECTION, SOLUTION INTRAMUSCULAR; INTRAVENOUS AS NEEDED
Status: DISCONTINUED | OUTPATIENT
Start: 2022-01-14 | End: 2022-01-14 | Stop reason: HOSPADM

## 2022-01-14 RX ORDER — ACETAMINOPHEN 325 MG/1
650 TABLET ORAL EVERY 6 HOURS
Status: DISCONTINUED | OUTPATIENT
Start: 2022-01-14 | End: 2022-01-14 | Stop reason: SDUPTHER

## 2022-01-14 RX ORDER — CEFAZOLIN SODIUM 2 G/100ML
2 INJECTION, SOLUTION INTRAVENOUS ONCE
Status: COMPLETED | OUTPATIENT
Start: 2022-01-14 | End: 2022-01-14

## 2022-01-14 RX ORDER — ACETAMINOPHEN 325 MG/1
650 TABLET ORAL EVERY 6 HOURS SCHEDULED
Qty: 40 TABLET | Refills: 0 | Status: SHIPPED | OUTPATIENT
Start: 2022-01-14 | End: 2022-01-19

## 2022-01-14 RX ORDER — IBUPROFEN 600 MG/1
600 TABLET ORAL EVERY 6 HOURS SCHEDULED
Qty: 20 TABLET | Refills: 0 | Status: SHIPPED | OUTPATIENT
Start: 2022-01-14 | End: 2022-01-19

## 2022-01-14 RX ORDER — IBUPROFEN 600 MG/1
600 TABLET ORAL EVERY 6 HOURS SCHEDULED
Status: DISCONTINUED | OUTPATIENT
Start: 2022-01-14 | End: 2022-01-14 | Stop reason: SDUPTHER

## 2022-01-14 RX ORDER — IBUPROFEN 600 MG/1
600 TABLET ORAL EVERY 6 HOURS SCHEDULED
Status: DISCONTINUED | OUTPATIENT
Start: 2022-01-14 | End: 2022-01-14 | Stop reason: HOSPADM

## 2022-01-14 RX ORDER — LIDOCAINE HYDROCHLORIDE 10 MG/ML
INJECTION, SOLUTION INFILTRATION; PERINEURAL AS NEEDED
Status: DISCONTINUED | OUTPATIENT
Start: 2022-01-14 | End: 2022-01-14 | Stop reason: HOSPADM

## 2022-01-14 RX ORDER — ONDANSETRON 2 MG/ML
INJECTION INTRAMUSCULAR; INTRAVENOUS AS NEEDED
Status: DISCONTINUED | OUTPATIENT
Start: 2022-01-14 | End: 2022-01-14 | Stop reason: HOSPADM

## 2022-01-14 RX ORDER — MIDAZOLAM HYDROCHLORIDE 1 MG/ML
INJECTION INTRAMUSCULAR; INTRAVENOUS AS NEEDED
Status: DISCONTINUED | OUTPATIENT
Start: 2022-01-14 | End: 2022-01-14 | Stop reason: HOSPADM

## 2022-01-14 RX ORDER — BUPIVACAINE HYDROCHLORIDE 5 MG/ML
INJECTION, SOLUTION PERINEURAL AS NEEDED
Status: DISCONTINUED | OUTPATIENT
Start: 2022-01-14 | End: 2022-01-14 | Stop reason: HOSPADM

## 2022-01-14 RX ADMIN — IBUPROFEN 600 MG: 600 TABLET ORAL at 18:19

## 2022-01-14 RX ADMIN — CEFAZOLIN SODIUM 2 G: 2 INJECTION, SOLUTION INTRAVENOUS at 14:21

## 2022-01-14 RX ADMIN — ACETAMINOPHEN 650 MG: 325 TABLET, FILM COATED ORAL at 16:25

## 2022-01-14 NOTE — OP NOTE
Cardiac Electrophysiology Procedure Note       [facility] at [facility]    PROCEDURE PERFORMED:  IMPLANTATION OF A TOTALLY SUBCUTANEOUS                                                           ICD (SICD).    OPERATIONS PERFORMED: Implantation of a [employer] SICD [id number] serial number pulse generator with [employer] SICD lead 3503, [id number] serial number.    ATTENDING SURGEON:  Shadi Larsen DO     MODERATE SEDATION FOR PROCEDURE:    Moderate sedation was given during this procedure.    I supervised and directed RN to administer this sedation.  This staff member also monitored the patient's hemodynamic and respiratory status and response to these medications.  Please see the full detailed procedure report generated by the electrophysiology laboratory staff.  The patient tolerated moderate sedation well.  There were no complications regarding sedation.  The total dose of fentanyl was 175 mcg and the total dose of midazolam was 6 mg.  The total dose of Brevital was 100 mg.  First sedation was administered at 1406 and continued through 1526.    ESTIMATED BLOOD LOSS: 0    RADIATION EXPOSURE: 0 minutes and 0 milliGray.    COMPLICATIONS: None    TIME OUT: Time out was completed with verification of the correct patient identity, procedure to be performed, procedure site and implanted equipment.    INDICATION FOR PROCEDURE:  Briefly,  Cas Mccabe is a 56 y.o. year old male with a history of ischemic cardiomyopathy with a QRS duration of less than 100 milliseconds, severe systolic dysfunction with an LVEF of 30% and NYHA functional class 3 heart failure.  Last myocardial infarction is October 20, 2021.    Past Medical History:         Patient Active Problem List     Diagnosis Date Noted   • Cardiomyopathy, dilated (HCC) 07/07/2017       Priority: High              · Echo 2-: EF 30-35%.  Left heart catheterization showing 50% LAD lesion  · Echo 12-: Left  ventricular diastolic dysfunction (grade II) consistent with pseudonormalization. Calculated EF = 30%. Estimated EF was in agreement with the calculated EF. Normal left ventricular wall thickness noted.  · Echo 12-7-21: Left ventricular ejection fraction appears to be 26 - 30%. Left ventricular systolic function is severely decreased. The left ventricular cavity is moderately dilated.      • ASCVD (arteriosclerotic cardiovascular disease) 11/19/2021       Priority: Medium       Note Last Updated: 1/3/2022       · [facility] 10-28-21: The 80% ostial LAD stenosis is status post intervention with a Xience Florecita 4.0 x 8 mm drug-eluting stent         Please especially note that the patient has been on maximally tolerated doses of guideline directed medical therapy, including but not limited to: HF indicated beta-blocker (carvedilol, metoprolol succinate), ACE Inhibitor or Angiotensin receptor blocker.  Please note that for some of these medications the maximally tolerated dose may be zero.  The patient has not had a myocardial infarction within 40 days and has not had coronary revascularization within 90 days.    This patient who is a candidate for an ICD has a life expectancy greater than 12 months.    PROCEDURE AND FINDINGS:     The patient was able to give written informed consent after revisiting the key portions of the risk versus benefit profile of the procedure.  This discussion was framed by our lengthy conversations  (please see our detailed notes).  Patient verbalized strong understanding of this discussion and a strong desire to proceed with the procedure.  Please note that this detailed informed consent process utilized mutual and shared decision making process between all parties involved, principally the physician and patient, but also potentially with input from the patient's selected family and friends.    The patient was brought to the electrophysiology suite in a post-absorptive state.  Informed consent was  obtained prior to the procedure and confirmed.  Intravenous prophylactic antibiotics were administered and confirmed to be completely infused prior to start of the procedure.  Additionally intravenous acetominophen was given prophylactically for pain as well.    Patient was appropriately screened for SICD implantation.  The following vectors were deemed acceptable in both the supine and standing position: All 3 vectors midline sternum.    We assessed the patient for the appropriate site of implantation for the S ICD using a combination of surface anatomic landmarks including the angle of Srini suprasternal notch xiphoid process 4th and 5th intercostal spaces mid axillary line and both right and left force of the sternum.  We confirmed the correct device implantation site with fluoroscopy.  The correct site of implantation was marked with an indelible ink marker.    The patient was then prepared and draped in routine sterile fashion.  After the site of implantation was prepped and draped in the usual sterile fashion and after adequate anesthesia was given, the skin was infiltrated with 1% lidocaine and 0.5% bupivicaine 50/50 mixture.  The skin was incised with a #10 scalpel.  There were two incisions.  The primary incision is at the left lateral chest wall at the level of the 4th intercostal space approximately.  The secondary incision is at the xiphoid process.  With all incisions blunt, Metzenbaum scissor and electrosurgical dissection was carried out to the level of the fascia with careful attention paid to hemostasis.  A pocket to house the pulse generator was formed between the muscular fascia and subcutaneous fat with blunt and electrosurgical dissection.  The pocket dissection was carried out in a posterior direction.  Critical muscular anatomic landmarks of the serratus anterior pectoralis major and latissimus dorsi muscles were noted. These muscles formed the medial anterior and posterior aspects of the  device pocket.  The pocket was copiously irrigated with antibiotic containing normal saline and subsequently observed.  Once adequate hemostasis was confirmed we packed the primary incision with several laparotomy sponges soaked in lidocaine bupivacaine epinephrine and gentamicin.  These were later removed with the correct surgical sponge count after the totally portion of the procedure.  We then tunneled the lead from the primary incision to the secondary incision and then along the sternum.  The lead was sutured appropriately to fascia with nonabsorbable suture.    We then removed the laparotomy sponges from the primary device pocket.  Once again we confirmed the correct surgical sponge count.    The S ICD pulse generator was brought onto the field the pin of the lead was then connected to the appropriate port.  Testing was performed showing this carried connection.  We then placed the S ICD pulse generator within the pocket.  The device was then secured to the header stitch.    Both wounds were then closed with 2 layers of absorbable suture and finally a layer of surgical adhesive.   We then placed a surgical dressing on all 3 wounds.    High-voltage circuit testing was performed with a central synchronous shock.  The resulted in impedance was 101 ohms    The patient is this ICD was optimized in the room prior to the patient leaving.    The S ICD chose the secondary vector, however the primary and alternate vectors were also acceptable.    UNDERLYING RHYTHM: Sinus    PROGRAMMED DEVICE DATA:    Post shock pacing was programmed on.  Shock polarity is programmed as standard.  Two zones of tachycardia detection program.  There is a conditional shock zone programmed at 200 bpm and shock zone is additionally programmed at 250 bpm.  PLEASE NOTE THAT THE SHOCK OUTPUT ON THE S ICD IS NON PROGRAMMABLE AND IS FIXED AT 80 JOULES.    CONCLUSION:  Successful implantation of an SICD system.     RECOMMENDATIONS:  No Lovenox or  "heparin at any dose is to be given.        FOR THE PATIENT        1) We ask you the patient to to leave the wound open to the air.  The glue is the bandage.    2) We ask you to not shower or get the wounds wet for 2 days only.  After this you may shower but we ask that you not scrub or attempt to \"clean\" your surgery wounds.    3) Patients may experience some discomfort especially when performing side bending or twisting of the torso.  This is normal.  Mild to moderate pain is normal and is to be expected.  While there is no explicit recommendation to not lift any heavy objects or raise the left arm above a certain level, doing so may result in some discomfort.  The discomfort should be limiting factor in performing these maneuvers.  In other words, discomfort should limit your activity.  It is not our goal to completely eliminate your pain as this may lead to overactivity, poor wound healing and worse outcomes.  We prefer not to prescribe narcotics for this reason.    4) We ask that you call our office at  with any questions, 24 hours a day and specifically ask for the EP doctor on call with any questions about the device or the wounds.    5) We ask that you follow up with our EP Service Nurse Practitioners in approximately one week and then with me in 3 months.          Shadi Larsen DO, FACC, RS  Cardiac Electrophysiologist  [facility] / [facility]      "

## 2022-01-14 NOTE — INTERVAL H&P NOTE
H&P reviewed. The patient was examined and there are no changes to the H&P.       EP Pre-Procedure Report  Cardiovascular Laboratory  [facility]    Patient:  Cas Mccabe  :  1965  PCP:  Justa Ruiz APRN  PHONE:  [phone]    DATE: 2022      MEDICATIONS:  Prior to Admission medications    Medication Sig Start Date End Date Taking? Authorizing Provider   apixaban (ELIQUIS) 5 MG tablet tablet Take 1 tablet by mouth Every 12 (Twelve) Hours. Indications: Other - full anticoagulation 10/29/21  Yes Fabian Arnold MD   atorvastatin (LIPITOR) 80 MG tablet Take 1 tablet by mouth Every Night. 10/29/21  Yes Fabian Arnold MD   carvedilol (COREG) 6.25 MG tablet Take 1 tablet by mouth 2 (Two) Times a Day With Meals. 10/29/21  Yes Fabian Arnold MD   clopidogrel (PLAVIX) 75 MG tablet Take 1 tablet by mouth Daily. 10/29/21  Yes Fabian Arnold MD   empagliflozin (Jardiance) 10 MG tablet tablet Take 1 tablet by mouth Daily. 10/7/21  Yes Marivel Hernandez DO   glipizide (GLUCOTROL) 10 MG tablet Take 10 mg by mouth 2 (Two) Times a Day Before Meals.   Yes Jonelle Hernandez MD   metFORMIN (GLUCOPHAGE) 1000 MG tablet Take 1,000 mg by mouth 2 (Two) Times a Day With Meals.   Yes Jonelle Hernandez MD   sacubitril-valsartan (Entresto) 49-51 MG tablet Take 1 tablet by mouth 2 (Two) Times a Day. 21  Yes Fabian Arnold MD   spironolactone (ALDACTONE) 25 MG tablet Take 1 tablet by mouth Daily. 10/29/21  Yes Fabian Arnold MD   cefdinir (OMNICEF) 300 MG capsule Take 300 mg by mouth 2 (Two) Times a Day.  22  Jonelle Hernandez MD   doxycycline (VIBRAMYCIN) 100 MG capsule Take 100 mg by mouth 2 (Two) Times a Day.  22  Jonelle Hernandez MD   guaiFENesin (MUCINEX) 600 MG 12 hr tablet Take 1,200 mg by mouth 2 (Two) Times a Day.  22  Provider, MD Jonelle       Past medical & surgical  history, social and family history reviewed in the electronic medical record.    Physical Exam:    Vitals:   Vitals:    01/14/22 1251   BP:    Pulse: 75   Resp: 17   Temp: 97.1 °F (36.2 °C)   SpO2: 100%          01/14/22  1251      Weight: 105 kg (231 lb 14.8 oz)      Body mass index is 42.42 kg/m².    GENERAL: No apparent distress.  No significant changes since last exam.  CHEST: Clear to auscultation bilaterally no stridor no wheeze.  CV: S1, S2, Regular without Murmurs, Rubs or Gallops  EXTREMITIES: No edema.            Results from last 7 days   Lab Units 01/14/22  1248   WBC 10*3/mm3 7.80   HEMOGLOBIN g/dL 15.5   HEMATOCRIT % 46.7   PLATELETS 10*3/mm3 287     CrCl cannot be calculated (Patient's most recent lab result is older than the maximum 30 days allowed.).    IMPRESSION:Cardiomyopathy      PLAN:  Procedure to perform: SICD        Electronically signed by LOLI Esquivel, 01/14/22, 1:25 PM EST.

## 2022-01-17 ENCOUNTER — CALL CENTER PROGRAMS (OUTPATIENT)
Dept: CALL CENTER | Facility: HOSPITAL | Age: 57
End: 2022-01-17

## 2022-01-17 NOTE — OUTREACH NOTE
PCI/Device Survey      Responses   Facility patient discharged from? [facility]   Procedure date 01/14/22   Procedure (if device, specify in description) Device   Device Description SICD   Performing MD Dr. Shadi Larsen   Attempt successful? Yes   Call start time 1120   Call end time 1127   Has the patient had any of the following symptoms since discharge? --  [Denies any symptoms. ]   Is the patient taking prescribed medications: --  [Eliquis. ]   Nursing intervention Reminded to continue to take prescribed medications   Does the patient have any of the following symptoms related to the cath/surgical site? --  [Patient denies any issues at chest site. ]   Does the patient have an appointment scheduled with the cardiologist? Yes   Appointment comments CARDIOLOGY Fabian Doroteo Arnold MD Tuesday Feb 22, 2022 1:45 PM. Schedule an appointment with Shadi Larsen DO for a 1 wound check and a 3 month device check     If the patient is a current smoker, are they able to teach back resources for cessation? Not a smoker   Did the patient feel prepared to go home on the same day as the procedure? Yes   Is the patient satisfied with the same day discharge process? Yes   PCI/Device call completed Yes   Wrap up additional comments Patient denies further questions or needs today.           Roxanna García RN

## 2022-01-18 ENCOUNTER — TELEPHONE (OUTPATIENT)
Dept: CARDIOLOGY | Facility: CLINIC | Age: 57
End: 2022-01-18

## 2022-01-18 NOTE — TELEPHONE ENCOUNTER
I spoke with patient for 72 hour follow up in the Block SICD trial. He states his pain level at 72 hours is an 8.  Plans made for his wound check.

## 2022-02-04 LAB
QT INTERVAL: 400 MS
QTC INTERVAL: 467 MS

## 2022-02-22 ENCOUNTER — OFFICE VISIT (OUTPATIENT)
Dept: CARDIOLOGY | Facility: CLINIC | Age: 57
End: 2022-02-22

## 2022-02-22 VITALS
SYSTOLIC BLOOD PRESSURE: 133 MMHG | BODY MASS INDEX: 42.84 KG/M2 | WEIGHT: 232.8 LBS | DIASTOLIC BLOOD PRESSURE: 74 MMHG | HEART RATE: 96 BPM | HEIGHT: 62 IN | OXYGEN SATURATION: 98 %

## 2022-02-22 DIAGNOSIS — I10 PRIMARY HYPERTENSION: ICD-10-CM

## 2022-02-22 DIAGNOSIS — I50.42 CHRONIC COMBINED SYSTOLIC AND DIASTOLIC CONGESTIVE HEART FAILURE: ICD-10-CM

## 2022-02-22 DIAGNOSIS — I25.10 ASCVD (ARTERIOSCLEROTIC CARDIOVASCULAR DISEASE): ICD-10-CM

## 2022-02-22 DIAGNOSIS — I42.0 CARDIOMYOPATHY, DILATED: ICD-10-CM

## 2022-02-22 DIAGNOSIS — E78.5 HYPERLIPIDEMIA, UNSPECIFIED HYPERLIPIDEMIA TYPE: Primary | ICD-10-CM

## 2022-02-22 PROCEDURE — 99214 OFFICE O/P EST MOD 30 MIN: CPT | Performed by: INTERNAL MEDICINE

## 2022-02-22 RX ORDER — CARVEDILOL 12.5 MG/1
6.25 TABLET ORAL 2 TIMES DAILY WITH MEALS
Qty: 180 TABLET | Refills: 3 | Status: SHIPPED | OUTPATIENT
Start: 2022-02-22

## 2022-03-17 PROCEDURE — 93296 REM INTERROG EVL PM/IDS: CPT | Performed by: INTERNAL MEDICINE

## 2022-03-17 PROCEDURE — 93295 DEV INTERROG REMOTE 1/2/MLT: CPT | Performed by: INTERNAL MEDICINE

## 2022-04-25 ENCOUNTER — OFFICE VISIT (OUTPATIENT)
Dept: CARDIOLOGY | Facility: CLINIC | Age: 57
End: 2022-04-25

## 2022-04-25 VITALS
DIASTOLIC BLOOD PRESSURE: 78 MMHG | OXYGEN SATURATION: 99 % | HEIGHT: 62 IN | SYSTOLIC BLOOD PRESSURE: 130 MMHG | BODY MASS INDEX: 42.33 KG/M2 | WEIGHT: 230 LBS | HEART RATE: 79 BPM

## 2022-04-25 DIAGNOSIS — I63.9 OCCIPITAL INFARCTION: ICD-10-CM

## 2022-04-25 DIAGNOSIS — I42.0 CARDIOMYOPATHY, DILATED: Primary | ICD-10-CM

## 2022-04-25 DIAGNOSIS — I10 PRIMARY HYPERTENSION: ICD-10-CM

## 2022-04-25 DIAGNOSIS — I50.42 CHRONIC COMBINED SYSTOLIC AND DIASTOLIC CONGESTIVE HEART FAILURE: ICD-10-CM

## 2022-04-25 PROCEDURE — 99214 OFFICE O/P EST MOD 30 MIN: CPT | Performed by: INTERNAL MEDICINE

## 2022-04-25 PROCEDURE — 93260 PRGRMG DEV EVAL IMPLTBL SYS: CPT | Performed by: INTERNAL MEDICINE

## 2022-04-25 NOTE — PROGRESS NOTES
Cardiac Electrophysiology Outpatient Follow Up Note            [facility]    Follow Up Office Visit      Cas Mccabe  [id number]  04/25/2022  [unfilled]  [unfilled]    Primary Care Physician: Justa Ruiz APRN    Referred By: No ref. provider found    Subjective     Chief Complaint:   Diagnoses and all orders for this visit:    1. Cardiomyopathy, dilated (HCC) (Primary)    2. Chronic combined systolic and diastolic congestive heart failure (HCC)    3. Primary hypertension    4. Occipital infarction (HCC)      Chief Complaint   Patient presents with   • Coronary Artery Disease       History of Present Illness:   Cas Mccabe is a 57 y.o. male who presents to my electrophysiology clinic for follow up of above complaints.  Doing well.  Does not think about his S ICD site anymore.  It really does not bother him very much.  Minimal discomfort if any.  No chest pain nausea vomit fevers chills syncope presyncope.  No issues of bleeding.  Takes Eliquis well.    Compliant with medications    Review of Systems:   Constitutional: No fevers or chills, no recent weight gain or weight loss or fatigue  Eyes: No visual loss, blurred vision, double vision, yellow sclerae.  ENT: No headaches, hearing loss, vertigo, congestion or sore throat.   Cardiovascular: Per HPI  Respiratory: No cough or wheezing, no sputum production, no hematemesis   Gastrointestinal: No abdominal pain, no nausea, vomiting, constipation, diarrhea, melena.   Genitourinary: No dysuria, hematuria or increased frequency.  Musculoskeletal:  No gait disturbance, weakness or joint pain or stiffness  Integumentary: No rashes, urticaria, ulcers or sores.   Neurological: No headache, dizziness, syncope, paralysis, ataxia, no prior CVA/TIA  Psychiatric: No anxiety, or depression  Endocrine: No diaphoresis, cold or heat intolerance. No polyuria or polydipsia.   Hematologic/Lymphatic: No anemia,  abnormal bruising or bleeding. No history of DVT/PE.      Past Medical History:   Past Medical History:   Diagnosis Date   • CAD (coronary artery disease)    • Diabetes mellitus (HCC)    • Hyperlipidemia    • Hypertension        Past Surgical History:   Past Surgical History:   Procedure Laterality Date   • CARDIAC CATHETERIZATION      no stents   • CARDIAC CATHETERIZATION N/A 10/19/2021    Procedure: Left Heart Cath;  Surgeon: Fabian Arnold MD;  Location:  [facility] CATH INVAS[facility] LOCATION;  Service: Cardiology;  Laterality: N/A;   • CARDIAC CATHETERIZATION N/A 10/28/2021    Procedure: Coronary angiography;  Surgeon: Wisam Ledesma MD;  Location:  [facility] C[facility];  Service: Cardiology;  Laterality: N/A;   • CARDIAC ELECTROPHYSIOLOGY PROCEDURE N/A 1/14/2022    Procedure: SICD ([facility]), hold Eliquis 1 day;  Surgeon: Shadi Larsen DO;  Location:  [facility] EP INVASIVE LOCATION;  Service: Cardiology;  Laterality: N/A;   • CORONARY STENT PLACEMENT         Family History:   Family History   Problem Relation Age of Onset   • Diabetes Mother    • Kidney failure Mother    • Diabetes Father    • Heart disease Father    • Kidney failure Father        Social History:   Social History     Socioeconomic History   • Marital status:    Tobacco Use   • Smoking status: Never Smoker   • Smokeless tobacco: Never Used   Substance and Sexual Activity   • Alcohol use: No   • Drug use: No   • Sexual activity: Yes     Partners: Female     Birth control/protection: None       Medications:     Current Outpatient Medications:   •  apixaban (ELIQUIS) 5 MG tablet tablet, Take 1 tablet by mouth Every 12 (Twelve) Hours. Indications: Other - full anticoagulation, Disp: 180 tablet, Rfl: 3  •  atorvastatin (LIPITOR) 80 MG tablet, Take 1 tablet by mouth Every Night., Disp: 90 tablet, Rfl: 3  •  carvedilol (COREG) 12.5 MG tablet, Take 0.5 tablets by mouth 2 (Two) Times a Day With Meals., Disp: 180 tablet, Rfl: 3  •   "clopidogrel (PLAVIX) 75 MG tablet, Take 1 tablet by mouth Daily., Disp: 90 tablet, Rfl: 3  •  empagliflozin (Jardiance) 10 MG tablet tablet, Take 1 tablet by mouth Daily., Disp: 30 tablet, Rfl: 0  •  glipizide (GLUCOTROL) 10 MG tablet, Take 10 mg by mouth 2 (Two) Times a Day Before Meals., Disp: , Rfl:   •  metFORMIN (GLUCOPHAGE) 1000 MG tablet, Take 1,000 mg by mouth 2 (Two) Times a Day With Meals., Disp: , Rfl:   •  sacubitril-valsartan (Entresto) 49-51 MG tablet, Take 1 tablet by mouth 2 (Two) Times a Day., Disp: 60 tablet, Rfl: 3  •  spironolactone (ALDACTONE) 25 MG tablet, Take 1 tablet by mouth Daily., Disp: 90 tablet, Rfl: 3    Allergies:   No Known Allergies    Objective   Vital Signs:   Vitals:    04/25/22 1304   BP: 130/78   BP Location: Right arm   Patient Position: Sitting   Pulse: 79   SpO2: 99%   Weight: 104 kg (230 lb)   Height: 157.5 cm (62\")       PHYSICAL EXAM  General appearance: Awake, alert, cooperative  Head: Normocephalic, without obvious abnormality, atraumatic  Eyes: Conjunctivae/corneas clear, EOMs intact  Neck: no adenopathy, no carotid bruit, no JVD and thyroid: not enlarged  Lungs: clear to auscultation bilaterally and no rhonchi or crackles\", ' symmetric  Heart: regular rate and rhythm, S1, S2 normal, no murmur, click, rub or gallop  Abdomen: Soft, non-tender, bowel sounds normal,  no organomegaly  Extremities: extremities normal, atraumatic, no cyanosis or edema  Skin: Skin color, turgor normal, no rashes or lesions  Neurologic: Grossly normal     Lab Results   Component Value Date    GLUCOSE 248 (H) 01/14/2022    CALCIUM 9.8 01/14/2022     01/14/2022    K 4.3 01/14/2022    CO2 26.0 01/14/2022     01/14/2022    BUN 20 01/14/2022    CREATININE 0.76 01/14/2022    EGFRIFNONA 107 01/14/2022    BCR 26.3 (H) 01/14/2022    ANIONGAP 10.0 01/14/2022     Lab Results   Component Value Date    WBC 7.80 01/14/2022    HGB 15.5 01/14/2022    HCT 46.7 01/14/2022    MCV 86.3 01/14/2022    "  01/14/2022     Lab Results   Component Value Date    INR 0.91 01/14/2022    INR 0.90 10/05/2021    INR 0.90 12/19/2018    PROTIME 12.0 01/14/2022    PROTIME 12.6 (L) 10/05/2021    PROTIME 12.4 12/19/2018     Lab Results   Component Value Date    TSH 0.811 12/20/2018    A5XGFAG 7.8 01/31/2014       Cardiac Testing:     I personally viewed and interpreted the patient's EKG/Telemetry/lab data    Procedures    Tobacco Cessation: N/A  Obstructive Sleep Apnea Screening: Completed    Assessment & Plan    Diagnoses and all orders for this visit:    1. Cardiomyopathy, dilated (HCC) (Primary)    2. Chronic combined systolic and diastolic congestive heart failure (HCC)    3. Primary hypertension    4. Occipital infarction (HCC)         Diagnosis Plan   1. Cardiomyopathy, dilated (HCC)   heart failure symptoms stable.  Functional class III.  Good medical therapy.  No need for further diuresis.   2. Chronic combined systolic and diastolic congestive heart failure (HCC)   stable.  No need for further diuresis.  Doing well.    [employer] subcutaneous ICD system implanted 3 months ago.  Wounds of healed nicely.  Appropriate sensing and impedance values.      This patient's Cardiac Implanted Electronic Device was manually interrogated and reprogrammed during the patient encounter today.  Iterative programming changes were manually made to determine the sensing threshold, pacing threshold, lead impedance as well as underlying cardiac rhythm.  These programming changes were not limited to but included some or all of the following when appropriate: pacing mode, programmed AV delays, blanking periods, and refractory periods.  Data obtained as a result of these manual programing changes informed the patient's CIED permanent programming.   3. Primary hypertension   blood pressure well controlled.   4. Occipital infarction (HCC)   no evidence of any atrial arrhythmia.    Apixaban.     Body mass index is 42.07 kg/m².    I  spent 43 minutes in consultation with this patient which included more than 65% of this time in direct face-to-face counseling, physical examination and discussion of my assessment and findings and shared decision making with the patient.  The remainder of the time not spent face to face was performing one, some or all of the following actions:  preparing to see this patient ( eg. Review of tests),  ordering medications, tests or procedures ), care coordination, discussion of the plan with other healthcare providers, documenting clinical information in Epic well as independently interpreting results and communicating results to patient, family and or caregiver.  All time noted occurred on the date of service.    Follow Up:       Thank you for allowing me to participate in the care of your patient. Please to not hesitate to contact me with additional questions or concerns.      Shadi Larsen DO, FACC, RS  Cardiac Electrophysiologist  [facility] / [facility]

## 2022-05-02 RX ORDER — IBUPROFEN 600 MG/1
TABLET ORAL
Qty: 20 TABLET | Refills: 0 | OUTPATIENT
Start: 2022-05-02

## 2022-06-13 ENCOUNTER — OFFICE VISIT (OUTPATIENT)
Dept: CARDIOLOGY | Facility: CLINIC | Age: 57
End: 2022-06-13

## 2022-06-13 VITALS
OXYGEN SATURATION: 98 % | DIASTOLIC BLOOD PRESSURE: 79 MMHG | HEART RATE: 84 BPM | WEIGHT: 235.2 LBS | HEIGHT: 62 IN | SYSTOLIC BLOOD PRESSURE: 133 MMHG | BODY MASS INDEX: 43.28 KG/M2

## 2022-06-13 DIAGNOSIS — I10 PRIMARY HYPERTENSION: ICD-10-CM

## 2022-06-13 DIAGNOSIS — I50.42 CHRONIC COMBINED SYSTOLIC AND DIASTOLIC CONGESTIVE HEART FAILURE: ICD-10-CM

## 2022-06-13 DIAGNOSIS — E78.5 HYPERLIPIDEMIA, UNSPECIFIED HYPERLIPIDEMIA TYPE: ICD-10-CM

## 2022-06-13 DIAGNOSIS — I25.10 ASCVD (ARTERIOSCLEROTIC CARDIOVASCULAR DISEASE): ICD-10-CM

## 2022-06-13 DIAGNOSIS — I42.0 CARDIOMYOPATHY, DILATED: Primary | ICD-10-CM

## 2022-06-13 PROCEDURE — 99214 OFFICE O/P EST MOD 30 MIN: CPT | Performed by: INTERNAL MEDICINE

## 2022-06-13 NOTE — PROGRESS NOTES
[facility] CARDIOLOGY  [address] [facility] [place] [address]  Phone: [phone]  Fax: [phone]    06/13/2022    Chief Complaint   Patient presents with   • Follow-up     3MOF/U,pt has no complaints   • Med Management     verbally        History:     Cas Mccabe is a 57 y.o. male presenting for  follow-up evaluation   Clinically stable from cardiac standpoint   Symptoms:  CP no  SOB no    Orthopnea No  Lower extremity edema no   Palpitations no   Compliant with medications yes  Claudication no      Past Medical History:   Diagnosis Date   • CAD (coronary artery disease)    • Diabetes mellitus (HCC)    • Hyperlipidemia    • Hypertension        Past Surgical History:   Procedure Laterality Date   • CARDIAC CATHETERIZATION      no stents   • CARDIAC CATHETERIZATION N/A 10/19/2021    Procedure: Left Heart Cath;  Surgeon: Fabian Arnold MD;  Location:  [facility] CATH INVASIVE LOCATION;  Service: Cardiology;  Laterality: N/A;   • CARDIAC CATHETERIZATION N/A 10/28/2021    Procedure: Coronary angiography;  Surgeon: Wisam Ledesma MD;  Location:  [facility] CATH INVASIVE LOCATION;  Service: Cardiology;  Laterality: N/A;   • CARDIAC ELECTROPHYSIOLOGY PROCEDURE N/A 1/14/2022    Procedure: SICD (BS), hold Eliquis 1 day;  Surgeon: Shadi Larsen DO;  Location:  [facility] EP INVASIVE LOCATION;  Service: Cardiology;  Laterality: N/A;   • CORONARY STENT PLACEMENT          Past Social History:  Social History     Socioeconomic History   • Marital status:    Tobacco Use   • Smoking status: Never Smoker   • Smokeless tobacco: Never Used   Substance and Sexual Activity   • Alcohol use: No   • Drug use: No   • Sexual activity: Yes     Partners: Female     Birth control/protection: None       Past Family History:  Family History   Problem Relation Age of Onset   • Diabetes Mother    • Kidney failure Mother    • Diabetes Father    • Heart disease Father    • Kidney failure Father   "      Review of Systems:   ROS       Current Outpatient Medications   Medication Sig Dispense Refill   • apixaban (ELIQUIS) 5 MG tablet tablet Take 1 tablet by mouth Every 12 (Twelve) Hours. Indications: Other - full anticoagulation 180 tablet 3   • atorvastatin (LIPITOR) 80 MG tablet Take 1 tablet by mouth Every Night. 90 tablet 3   • carvedilol (COREG) 12.5 MG tablet Take 0.5 tablets by mouth 2 (Two) Times a Day With Meals. 180 tablet 3   • clopidogrel (PLAVIX) 75 MG tablet Take 1 tablet by mouth Daily. 90 tablet 3   • empagliflozin (Jardiance) 10 MG tablet tablet Take 1 tablet by mouth Daily. 30 tablet 0   • glipizide (GLUCOTROL) 10 MG tablet Take 10 mg by mouth 2 (Two) Times a Day Before Meals.     • metFORMIN (GLUCOPHAGE) 1000 MG tablet Take 1,000 mg by mouth 2 (Two) Times a Day With Meals.     • sacubitril-valsartan (Entresto) 49-51 MG tablet Take 1 tablet by mouth 2 (Two) Times a Day. 60 tablet 3   • spironolactone (ALDACTONE) 25 MG tablet Take 1 tablet by mouth Daily. 90 tablet 3     No current facility-administered medications for this visit.        No Known Allergies    Objective     /79   Pulse 84   Ht 157.5 cm (62\")   Wt 107 kg (235 lb 3.2 oz)   SpO2 98%   BMI 43.02 kg/m²     Physical Exam  Constitutional:       Appearance: He is well-developed.   HENT:      Head: Normocephalic and atraumatic.   Eyes:      Pupils: Pupils are equal, round, and reactive to light.   Cardiovascular:      Rate and Rhythm: Normal rate and regular rhythm.   Pulmonary:      Effort: Pulmonary effort is normal.      Breath sounds: Normal breath sounds.   Abdominal:      General: Bowel sounds are normal.      Palpations: Abdomen is soft.   Musculoskeletal:         General: Normal range of motion.      Cervical back: Normal range of motion and neck supple.   Skin:     General: Skin is warm and dry.      Capillary Refill: Capillary refill takes less than 2 seconds.   Neurological:      Mental Status: He is alert and " oriented to person, place, and time.            DATA:  Results for orders placed during the hospital encounter of 10/28/21    SCANNED - TELEMETRY     Results for orders placed in visit on 11/16/21    Adult Transthoracic Echo Complete W/ Cont if Necessary Per Protocol    Interpretation Summary  · Left ventricular ejection fraction appears to be 26 - 30%. Left ventricular systolic function is severely decreased.  · The left ventricular cavity is moderately dilated.  · There is no evidence of pericardial effusion  · No significant changes compared to his previous study   Results for orders placed during the hospital encounter of 10/05/21    Stress Test With Myocardial Perfusion One Day    Interpretation Summary  · Pt has multiple perfusion abnormalities, has a medium to large size inferior infarction with mild ischemia extending into inferolateral territory, also noted mild ischemia in the distal anteroapical territory.  · Diaphragmatic attenuation artifact is present.  · Left ventricular ejection fraction is moderately reduced. (Calculated EF = 25%) with severe globaly hypokinesia and apical dyskinesia  · Impressions are consistent with a high risk study.   Results for orders placed during the hospital encounter of 10/05/21    Stress Test With Myocardial Perfusion One Day    Interpretation Summary  · Pt has multiple perfusion abnormalities, has a medium to large size inferior infarction with mild ischemia extending into inferolateral territory, also noted mild ischemia in the distal anteroapical territory.  · Diaphragmatic attenuation artifact is present.  · Left ventricular ejection fraction is moderately reduced. (Calculated EF = 25%) with severe globaly hypokinesia and apical dyskinesia  · Impressions are consistent with a high risk study.   Results for orders placed during the hospital encounter of 10/28/21    Cardiac Catheterization/Vascular Study    [facility] Cardiology  [id number], [address]  [place], [place], [address]  [phone]  WWW.[facility] HospitalR[facility]otics[facility]          CARDIAC CATHETERIZATION PROCEDURE NOTE    Impression  · The 80% ostial LAD stenosis is status post intervention with a Xience Florecita 4.0 x 8 mm drug-eluting stent  · Normal LVEDP of 5 mmHg    RECOMMENDATIONS:  · Dual antiplatelet therapy with aspirin and clopidogrel for 1 month.  Consider discontinuation of aspirin at follow-up in the cardiology office at [facility] to avoid triple therapy.  Resume Eliquis.  · Continue other cardiac medications.  Cardiac rehab referral.  Aggressive lifestyle and risk factor modification for CAD and HF.  · Follow-up with [facility] cardiology as previously scheduled on November 16, 2021    ----------------------------------------------------------------------------------------------------------------------    Indication(s) for this Procedure:  CCS class III angina, high risk stress test, cardiomyopathy.  Diagnostic angiogram performed earlier this month revealed an 80% ostial LAD stenosis.  The patient was scheduled for an elective PCI of the LAD at [facility] due to the availability of surgical backup.    Procedure(s) Performed:  1. Right radial artery access  2. Left heart catheterization  3. Selective coronary angiography of the left coronary system  4. Percutaneous coronary intervention and intravascular ultrasound of the LAD  5. Selective coronary angiography of the RCA    Description of the Procedure:  Informed consent was obtained with the goals, rationale, alternatives, risks and benefits of the procedure explained to the patient.    A 6Fr GlideSheath Slender sheath was placed in the right. Left heart catheterization without left ventriculography was performed with a 6Fr JL 3.5 guide catheter placed in the left ventricle.  Selective angiography of the left coronary arteries was performed with a 6Fr JL 3 guide catheter.    Due to a severe stenosis in the LAD  coronary artery, it was decided to proceed with intervention.  Heparin was given for anticoagulation. A Sidney blue guidewire was used to cross the stenosis. A 3.0 mm balloon was used to cross the lesion and inflated one time.  An intravascular ultrasound was performed.  A second Sidney blue guidewire was inserted into the circumflex to provide guide stability.  A Xience Florecita 4.0 x 8 mm drug eluting stent was placed.  Repeat intravascular ultrasound revealed a well opposed and well expanded stent.    Selective angiography of the right coronary artery was performed with a 5Fr JR4 diagnostic catheter.    The procedure was completed and the sheath was removed.  A radial hemostasis band was placed on the artery for hemostasis.    Angiographic Findings:  Right dominant coronary circulation  · Left main artery:   The vessel is normal size and bifurcates into an anterior descending and circumflex artery.  There is a 30% distal segment stenosis.  · Left anterior descending artery: The vessel is normal size and terminates in an apical artery.  There is an 80% ostial segment stenosis.  There is mild diffuse atherosclerosis of the distal segments.  The first diagonal branch is large sized without significant disease.  · Left circumflex artery: The vessel is normal size and terminates in an AV groove artery.  There is no significant disease.  The first obtuse marginal branch is large sized and without significant disease.  · Right coronary artery: The vessel is normal sized and bifurcates into an RPL S and RPDA.  There is mild atherosclerosis of the proximal segment.    Intravascular imaging:  • Intravascular ultrasound was performed to size the vessel for stenting.  A repeat intravascular ultrasound revealed a well opposed and well expanded stent.    Hemodynamic Findings:  · Ao pressure:  72/51/59 mmHg  · LVEDP:  5 mmHg  · LV to Ao peak to peak pullback gradient: 15 mmHg    Post-interventional Results:  Lesion #1  · ACC AHA  class lesion: Type B (angulation)  · Location:    Ostial LAD  · Stenosis pre-PCI:  80%  · Stenosis post-PCI:  0%  · RAMILA flow pre-PCI:  3  · RAMILA flow post-PCI:  3    Estimated Blood Loss:  Minimal    Specimen(s):  None obtained    Sheath:  Removed, radial hemostasis band    Complications:  There were no apparent early complications.    Wisam Ledesma MD, MSc, FACC, [facility]  Interventional Cardiology  [facility]    Procedures     Lab Results   Component Value Date    CHOL 119 10/28/2021    CHOL 129 10/06/2021    CHOL 177 12/20/2018    CHLPL 282 (H) 06/19/2014     Lab Results   Component Value Date    TRIG 136 10/28/2021    TRIG 132 10/06/2021    TRIG 280 (H) 12/20/2018     Lab Results   Component Value Date    HDL 36 (L) 10/28/2021    HDL 34 (L) 10/06/2021    HDL 40 (L) 12/20/2018     Lab Results   Component Value Date    LDL 59 10/28/2021    LDL 71 10/06/2021    LDL 81 12/20/2018       Lab Results   Component Value Date    TSH 0.811 12/20/2018               Invalid input(s): LABALBU, PROT        Assessment and Plan     Diagnosis Plan   1. Cardiomyopathy, dilated (HCC)  Adult Transthoracic Echo Complete W/ Cont if Necessary Per Protocol   2. ASCVD (arteriosclerotic cardiovascular disease)     3. Chronic combined systolic and diastolic congestive heart failure (HCC)     4. Hyperlipidemia, unspecified hyperlipidemia type     5. Primary hypertension             Recommended increase activity to 30 minutes of walking daily, most days of the week    Thank you for allowing me to participate in the care of Cas Mccabe. Feel free to contact me directly with any further questions or concerns.          Fabian Arnold MD, [facility]  Interventional Cardiology

## 2022-06-20 ENCOUNTER — HOSPITAL ENCOUNTER (OUTPATIENT)
Dept: CARDIOLOGY | Facility: HOSPITAL | Age: 57
Discharge: HOME | End: 2022-06-20
Admitting: INTERNAL MEDICINE

## 2022-06-20 PROCEDURE — 93306 TTE W/DOPPLER COMPLETE: CPT

## 2022-06-20 PROCEDURE — 93306 TTE W/DOPPLER COMPLETE: CPT | Performed by: INTERNAL MEDICINE

## 2022-06-28 LAB
BH CV ECHO MEAS - AO ROOT DIAM: 3.2 CM
BH CV ECHO MEAS - EDV(CUBED): 166.4 ML
BH CV ECHO MEAS - EDV(MOD-SP4): 93.1 ML
BH CV ECHO MEAS - EF(MOD-SP4): 42.3 %
BH CV ECHO MEAS - ESV(CUBED): 91.1 ML
BH CV ECHO MEAS - ESV(MOD-SP4): 53.7 ML
BH CV ECHO MEAS - FS: 18.2 %
BH CV ECHO MEAS - IVS/LVPW: 1 CM
BH CV ECHO MEAS - IVSD: 1.5 CM
BH CV ECHO MEAS - LA DIMENSION: 2.9 CM
BH CV ECHO MEAS - LAT PEAK E' VEL: 7.9 CM/SEC
BH CV ECHO MEAS - LV DIASTOLIC VOL/BSA (35-75): 45.5 CM2
BH CV ECHO MEAS - LV MASS(C)D: 373.1 GRAMS
BH CV ECHO MEAS - LV SYSTOLIC VOL/BSA (12-30): 26.2 CM2
BH CV ECHO MEAS - LVIDD: 5.5 CM
BH CV ECHO MEAS - LVIDS: 4.5 CM
BH CV ECHO MEAS - LVOT AREA: 4.5 CM2
BH CV ECHO MEAS - LVOT DIAM: 2.4 CM
BH CV ECHO MEAS - LVPWD: 1.5 CM
BH CV ECHO MEAS - MED PEAK E' VEL: 6.6 CM/SEC
BH CV ECHO MEAS - MV A MAX VEL: 76.3 CM/SEC
BH CV ECHO MEAS - MV E MAX VEL: 60.8 CM/SEC
BH CV ECHO MEAS - MV E/A: 0.8
BH CV ECHO MEAS - PA ACC TIME: 0.12 SEC
BH CV ECHO MEAS - PA PR(ACCEL): 25 MMHG
BH CV ECHO MEAS - SI(MOD-SP4): 19.2 ML/M2
BH CV ECHO MEAS - SV(MOD-SP4): 39.4 ML
BH CV ECHO MEAS - TAPSE (>1.6): 1.43 CM
BH CV ECHO MEASUREMENTS AVERAGE E/E' RATIO: 8.39
LEFT ATRIUM VOLUME INDEX: 24.3 ML/M2
MAXIMAL PREDICTED HEART RATE: 165 BPM
STRESS TARGET HR: 140 BPM

## 2022-07-05 ENCOUNTER — TELEPHONE (OUTPATIENT)
Dept: CARDIOLOGY | Facility: CLINIC | Age: 57
End: 2022-07-05

## 2022-07-05 NOTE — TELEPHONE ENCOUNTER
----- Message from Fabian Arnold MD sent at 7/5/2022  7:35 AM EDT -----  Regarding: FW:   Ef mildly improved  ----- Message -----  From: Fabian Arnold MD  Sent: 6/28/2022  11:22 AM EDT  To: Fabian Arnold MD

## 2022-09-15 PROCEDURE — 93296 REM INTERROG EVL PM/IDS: CPT | Performed by: INTERNAL MEDICINE

## 2022-09-15 PROCEDURE — 93295 DEV INTERROG REMOTE 1/2/MLT: CPT | Performed by: INTERNAL MEDICINE

## 2022-09-27 NOTE — TELEPHONE ENCOUNTER
Contacted patient to relay test results per Dr Resendez . Patient understood results and was reminded to keep follow up appointment    normal/soft/nontender/nondistended/normal active bowel sounds negative

## 2022-10-17 ENCOUNTER — OFFICE VISIT (OUTPATIENT)
Dept: CARDIOLOGY | Facility: CLINIC | Age: 57
End: 2022-10-17

## 2022-10-17 DIAGNOSIS — I50.42 CHRONIC COMBINED SYSTOLIC AND DIASTOLIC CONGESTIVE HEART FAILURE: ICD-10-CM

## 2022-10-17 DIAGNOSIS — I10 PRIMARY HYPERTENSION: ICD-10-CM

## 2022-10-17 DIAGNOSIS — I25.10 ASCVD (ARTERIOSCLEROTIC CARDIOVASCULAR DISEASE): ICD-10-CM

## 2022-10-17 DIAGNOSIS — E78.5 HYPERLIPIDEMIA, UNSPECIFIED HYPERLIPIDEMIA TYPE: Primary | ICD-10-CM

## 2022-10-17 PROCEDURE — 99214 OFFICE O/P EST MOD 30 MIN: CPT | Performed by: NURSE PRACTITIONER

## 2022-10-17 NOTE — PROGRESS NOTES
[facility] CARDIOLOGY  [address] [facility] [address]  [facility] [address]  Phone: [phone]  Fax: [phone]    Name: Cas Mccabe    Date: 10/17/2022  MRN:  [id number]  :  1965      Encounter Date: 10/17/2022    Chief Complaint   Patient presents with   • Follow-up     Patient denies chest pain or shortness of breath         History:     Cas Mccabe is a 57 y.o. male who follows with cardiology for his dilated cardiomyopathy (origianally dx with CMP in ), now s/p ICD implanted , ASCVD, combined systolic and diastolic heart failure, hyperlipidemia and hypertension, he did also have a [employer] subcutaneous ICD implanted in       HPI:     Mr. Mccabe is here today to follow up on his above cardiac conditions. He denies any chest pain or dyspnea no swelling.  He states he has been doing very well he does look to be in good health and has no complaints today.  He is not accompanied by anyone at his visit today.      Medical History:   Past Medical History:   Diagnosis Date   • CAD (coronary artery disease)    • Diabetes mellitus (HCC)    • Hyperlipidemia    • Hypertension        Surgical History:   Past Surgical History:   Procedure Laterality Date   • CARDIAC CATHETERIZATION      no stents   • CARDIAC CATHETERIZATION N/A 10/19/2021    Procedure: Left Heart Cath;  Surgeon: Fabian Arnold MD;  Location:  [facility] CATH INVASIVE LOCATION;  Service: Cardiology;  Laterality: N/A;   • CARDIAC CATHETERIZATION N/A 10/28/2021    Procedure: Coronary angiography;  Surgeon: Wisam Ledesma MD;  Location:  [facility] CATH [facility]VASIVE LOCATION;  Service: Cardiology;  Laterality: N/A;   • CARDIAC ELECTROPHYSIOLOGY PROCEDURE N/A 2022    Procedure: SICD (BSC), hold Eliquis 1 day;  Surgeon: Shadi Larsen DO;  Location:  [facility] EP INVASIVE LOCATION;  Service: Cardiology;  Laterality: N/A;   • CORONARY STENT PLACEMENT          Social History:  Social History      Socioeconomic History   • Marital status:    Tobacco Use   • Smoking status: Never   • Smokeless tobacco: Never   Vaping Use   • Vaping Use: Never used   Substance and Sexual Activity   • Alcohol use: No   • Drug use: No   • Sexual activity: Yes     Partners: Female     Birth control/protection: None       Family History:  Family History   Problem Relation Age of Onset   • Diabetes Mother    • Kidney failure Mother    • Diabetes Father    • Heart disease Father    • Kidney failure Father        Current Meds:   Current Outpatient Medications   Medication Sig Dispense Refill   • apixaban (ELIQUIS) 5 MG tablet tablet Take 1 tablet by mouth Every 12 (Twelve) Hours. Indications: Other - full anticoagulation 180 tablet 3   • atorvastatin (LIPITOR) 80 MG tablet Take 1 tablet by mouth Every Night. 90 tablet 3   • carvedilol (COREG) 12.5 MG tablet Take 0.5 tablets by mouth 2 (Two) Times a Day With Meals. 180 tablet 3   • clopidogrel (PLAVIX) 75 MG tablet Take 1 tablet by mouth Daily. 90 tablet 3   • empagliflozin (Jardiance) 10 MG tablet tablet Take 1 tablet by mouth Daily. 30 tablet 0   • glipizide (GLUCOTROL) 10 MG tablet Take 10 mg by mouth 2 (Two) Times a Day Before Meals.     • metFORMIN (GLUCOPHAGE) 1000 MG tablet Take 1,000 mg by mouth 2 (Two) Times a Day With Meals.     • sacubitril-valsartan (Entresto) 49-51 MG tablet Take 1 tablet by mouth 2 (Two) Times a Day. 60 tablet 3   • spironolactone (ALDACTONE) 25 MG tablet Take 1 tablet by mouth Daily. 90 tablet 3     No current facility-administered medications for this visit.        Allergies:   No Known Allergies    Objective     Vital Signs:   /80 (BP Location: Left arm, Patient Position: Sitting, Cuff Size: Large Adult)   Pulse 85   Wt 102 kg (225 lb 12.8 oz)   BMI 41.30 kg/m²         Physical Exam:    Physical Exam  Cardiovascular:      Rate and Rhythm: Normal rate and regular rhythm.      Pulses: Normal pulses.      Heart sounds: Normal heart  sounds.   Pulmonary:      Effort: Pulmonary effort is normal.      Breath sounds: Normal breath sounds.   Abdominal:      Palpations: Abdomen is soft.   Skin:     Capillary Refill: Capillary refill takes less than 2 seconds.   Neurological:      General: No focal deficit present.      Mental Status: He is alert and oriented to person, place, and time.   Psychiatric:         Mood and Affect: Mood normal.         Behavior: Behavior normal.         Thought Content: Thought content normal.         Judgment: Judgment normal.            DATA:  Results for orders placed during the hospital encounter of 10/28/21    SCANNED - TELEMETRY     Results for orders placed in visit on 06/13/22    Adult Transthoracic Echo Complete W/ Cont if Necessary Per Protocol    Interpretation Summary  · Left ventricular ejection fraction appears to be 36 - 40%. Left ventricular systolic function is moderately decreased.  · The left ventricular cavity is borderline dilated.  · Left ventricular diastolic function is consistent with (grade I) impaired relaxation.  · Compared to his previous study LV systolic function has improved from 30 to 40% and LV chamber dimension has also improved.   Results for orders placed during the hospital encounter of 10/05/21    Stress Test With Myocardial Perfusion One Day    Interpretation Summary  · Pt has multiple perfusion abnormalities, has a medium to large size inferior infarction with mild ischemia extending into inferolateral territory, also noted mild ischemia in the distal anteroapical territory.  · Diaphragmatic attenuation artifact is present.  · Left ventricular ejection fraction is moderately reduced. (Calculated EF = 25%) with severe globaly hypokinesia and apical dyskinesia  · Impressions are consistent with a high risk study.   Results for orders placed during the hospital encounter of 10/05/21    Stress Test With Myocardial Perfusion One Day    Interpretation Summary  · Pt has multiple perfusion  abnormalities, has a medium to large size inferior infarction with mild ischemia extending into inferolateral territory, also noted mild ischemia in the distal anteroapical territory.  · Diaphragmatic attenuation artifact is present.  · Left ventricular ejection fraction is moderately reduced. (Calculated EF = 25%) with severe globaly hypokinesia and apical dyskinesia  · Impressions are consistent with a high risk study.   Results for orders placed during the hospital encounter of 10/28/21    Cardiac Catheterization/Vascular Study    Narrative  Table formatting from the original result was not included.  Images from the original result were not included.  [facility] Cardiology  [address] [place] [address], [address]  [place], [place], [id number] [phone]  [email or web address]8aweekSoutheast [facility]          CARDIAC CATHETERIZATION PROCEDURE NOTE    Impression  · The 80% ostial LAD stenosis is status post intervention with a Xience Florecita 4.0 x 8 mm drug-eluting stent  · Normal LVEDP of 5 mmHg    RECOMMENDATIONS:  · Dual antiplatelet therapy with aspirin and clopidogrel for 1 month.  Consider discontinuation of aspirin at follow-up in the cardiology office at [facility] to avoid triple therapy.  Resume Eliquis.  · Continue other cardiac medications.  Cardiac rehab referral.  Aggressive lifestyle and risk factor modification for CAD and HF.  · Follow-up with [facility] cardiology as previously scheduled on November 16, 2021    ----------------------------------------------------------------------------------------------------------------------    Indication(s) for this Procedure:  CCS class III angina, high risk stress test, cardiomyopathy.  Diagnostic angiogram performed earlier this month revealed an 80% ostial LAD stenosis.  The patient was scheduled for an elective PCI of the LAD at [facility] due to the availability of surgical backup.    Procedure(s) Performed:  1. Right radial artery access  2. Left  heart catheterization  3. Selective coronary angiography of the left coronary system  4. Percutaneous coronary intervention and intravascular ultrasound of the LAD  5. Selective coronary angiography of the RCA    Description of the Procedure:  Informed consent was obtained with the goals, rationale, alternatives, risks and benefits of the procedure explained to the patient.    A 6Fr GlideSheath Slender sheath was placed in the right. Left heart catheterization without left ventriculography was performed with a 6Fr JL 3.5 guide catheter placed in the left ventricle.  Selective angiography of the left coronary arteries was performed with a 6Fr JL 3 guide catheter.    Due to a severe stenosis in the LAD coronary artery, it was decided to proceed with intervention.  Heparin was given for anticoagulation. A Sidney blue guidewire was used to cross the stenosis. A 3.0 mm balloon was used to cross the lesion and inflated one time.  An intravascular ultrasound was performed.  A second Sidney blue guidewire was inserted into the circumflex to provide guide stability.  A Xience Florecita 4.0 x 8 mm drug eluting stent was placed.  Repeat intravascular ultrasound revealed a well opposed and well expanded stent.    Selective angiography of the right coronary artery was performed with a 5Fr JR4 diagnostic catheter.    The procedure was completed and the sheath was removed.  A radial hemostasis band was placed on the artery for hemostasis.    Angiographic Findings:  Right dominant coronary circulation  · Left main artery:   The vessel is normal size and bifurcates into an anterior descending and circumflex artery.  There is a 30% distal segment stenosis.  · Left anterior descending artery: The vessel is normal size and terminates in an apical artery.  There is an 80% ostial segment stenosis.  There is mild diffuse atherosclerosis of the distal segments.  The first diagonal branch is large sized without significant disease.  · Left  circumflex artery: The vessel is normal size and terminates in an AV groove artery.  There is no significant disease.  The first obtuse marginal branch is large sized and without significant disease.  · Right coronary artery: The vessel is normal sized and bifurcates into an RPL S and RPDA.  There is mild atherosclerosis of the proximal segment.    Intravascular imaging:  • Intravascular ultrasound was performed to size the vessel for stenting.  A repeat intravascular ultrasound revealed a well opposed and well expanded stent.    Hemodynamic Findings:  · Ao pressure:  72/51/59 mmHg  · LVEDP:  5 mmHg  · LV to Ao peak to peak pullback gradient: 15 mmHg    Post-interventional Results:  Lesion #1  · ACC AHA class lesion: Type B (angulation)  · Location:    Ostial LAD  · Stenosis pre-PCI:  80%  · Stenosis post-PCI:  0%  · RAMILA flow pre-PCI:  3  · RAMILA flow post-PCI:  3    Estimated Blood Loss:  Minimal    Specimen(s):  None obtained    Sheath:  Removed, radial hemostasis band    Complications:  There were no apparent early complications.    Wisam Ledesma MD, MSc, FACC, [facility]  Interventional Cardiology  [facility]        Lab Results   Component Value Date    CHOL 119 10/28/2021    CHOL 129 10/06/2021    CHOL 177 12/20/2018    CHLPL 282 (H) 06/19/2014     Lab Results   Component Value Date    TRIG 136 10/28/2021    TRIG 132 10/06/2021    TRIG 280 (H) 12/20/2018     Lab Results   Component Value Date    HDL 36 (L) 10/28/2021    HDL 34 (L) 10/06/2021    HDL 40 (L) 12/20/2018     Lab Results   Component Value Date    LDL 59 10/28/2021    LDL 71 10/06/2021    LDL 81 12/20/2018       Lab Results   Component Value Date    TSH 0.811 12/20/2018             Invalid input(s): LABALBU, PROT    Procedures       Assessment and Plan:   Visit Diagnosis:   Problem List Items Addressed This Visit        Cardiac and Vasculature    Hyperlipidemia - Primary    ASCVD (arteriosclerotic cardiovascular disease)     Overview     · [facility] 10-28-21: The 80% ostial LAD stenosis is status post intervention with a Xience Florecita 4.0 x 8 mm drug-eluting stent         Primary hypertension    Chronic combined systolic and diastolic congestive heart failure (HCC)    Relevant Orders    Ambulatory Referral to Heart Failure Clinic    Adult Transthoracic Echo Complete W/ Cont if Necessary Per Protocol        Dilated CMP   Chronic combined systolic and diastolic heart failure  -2/2014 TTE: EF 30-35% [facility] showed 50% LAD lesion   -12/2018 TTE LV diastolic dysfunction (grade II) consistent with pseudonormalization, EF 30%,   -12/2021 TTE: EF 26-30%, LV cavity is moderately dilated  -01/14/2022 Subcutaneous ICD placed by Dr. Kenny   -6/20/2022 TTE: EF 36-40%, LV cavity is borderline dilated, LV diastolic function is consistent with grade 1 impaired relaxation.  -continue Coreg, entresto, aldactone and jardiance   -patient reports just had labs done at PCP, will request records  -Repeat echo ordered  -Referral placed for heart failure clinic    ASCVD  -10/28/2021 [facility]: stent placed to the LAD   -conitnue asa, plavix, statin and coreg    HLD  -10/28/2021 Lipid profile: Total cholesterol 119, HDL 36, LDL 59, Triglycerides 136  -continue Lipitor     Meds Ordered During Visit:  No orders of the defined types were placed in this encounter.      Discontinued Meds:   There are no discontinued medications.     Follow Up:   Return in about 3 months (around 1/17/2023).           Thank you for allowing me to participate in the care of Cas Mccabe. Feel free to contact me directly with any further questions or concerns.    This document has been electronically signed by Arlet German, ROSA MARIA  October 17, 2022 13:15 EDT      Dictated Utilizing Dragon Dictation: Part of this note may be an electronic transcription/translation of spoken language to printed text using the Dragon Dictation System.

## 2022-10-20 VITALS
HEART RATE: 85 BPM | WEIGHT: 225.8 LBS | BODY MASS INDEX: 33.44 KG/M2 | SYSTOLIC BLOOD PRESSURE: 117 MMHG | DIASTOLIC BLOOD PRESSURE: 80 MMHG | HEIGHT: 69 IN

## 2022-10-25 ENCOUNTER — APPOINTMENT (OUTPATIENT)
Dept: CARDIOLOGY | Facility: HOSPITAL | Age: 57
End: 2022-10-25

## 2022-11-01 ENCOUNTER — HOSPITAL ENCOUNTER (OUTPATIENT)
Dept: CARDIOLOGY | Facility: HOSPITAL | Age: 57
Discharge: HOME | End: 2022-11-01
Admitting: PHYSICIAN ASSISTANT

## 2022-11-01 VITALS
HEIGHT: 69 IN | WEIGHT: 228 LBS | DIASTOLIC BLOOD PRESSURE: 90 MMHG | BODY MASS INDEX: 33.77 KG/M2 | SYSTOLIC BLOOD PRESSURE: 140 MMHG | OXYGEN SATURATION: 99 % | HEART RATE: 86 BPM

## 2022-11-01 DIAGNOSIS — I42.0 CARDIOMYOPATHY, DILATED: ICD-10-CM

## 2022-11-01 DIAGNOSIS — E78.5 HYPERLIPIDEMIA, UNSPECIFIED HYPERLIPIDEMIA TYPE: ICD-10-CM

## 2022-11-01 DIAGNOSIS — I50.42 CHRONIC COMBINED SYSTOLIC AND DIASTOLIC CONGESTIVE HEART FAILURE: Primary | ICD-10-CM

## 2022-11-01 DIAGNOSIS — Z86.73 HISTORY OF CVA (CEREBROVASCULAR ACCIDENT): ICD-10-CM

## 2022-11-01 DIAGNOSIS — I25.10 ASCVD (ARTERIOSCLEROTIC CARDIOVASCULAR DISEASE): ICD-10-CM

## 2022-11-01 LAB
ABSOLUTE LUNG FLUID CONTENT: 28 % (ref 20–35)
ANION GAP SERPL CALCULATED.3IONS-SCNC: 8 MMOL/L (ref 5–15)
BUN SERPL-MCNC: 19 MG/DL (ref 6–20)
BUN/CREAT SERPL: 25.7 (ref 7–25)
CALCIUM SPEC-SCNC: 9.6 MG/DL (ref 8.6–10.5)
CHLORIDE SERPL-SCNC: 104 MMOL/L (ref 98–107)
CO2 SERPL-SCNC: 26 MMOL/L (ref 22–29)
CREAT SERPL-MCNC: 0.74 MG/DL (ref 0.76–1.27)
EGFRCR SERPLBLD CKD-EPI 2021: 107 ML/MIN/1.73
GLUCOSE SERPL-MCNC: 241 MG/DL (ref 65–99)
MAGNESIUM SERPL-MCNC: 1.9 MG/DL (ref 1.6–2.6)
NT-PROBNP SERPL-MCNC: 90.7 PG/ML (ref 0–900)
POTASSIUM SERPL-SCNC: 4.3 MMOL/L (ref 3.5–5.2)
SODIUM SERPL-SCNC: 138 MMOL/L (ref 136–145)

## 2022-11-01 PROCEDURE — 83735 ASSAY OF MAGNESIUM: CPT | Performed by: PHYSICIAN ASSISTANT

## 2022-11-01 PROCEDURE — 99215 OFFICE O/P EST HI 40 MIN: CPT | Performed by: PHYSICIAN ASSISTANT

## 2022-11-01 PROCEDURE — 83880 ASSAY OF NATRIURETIC PEPTIDE: CPT | Performed by: PHYSICIAN ASSISTANT

## 2022-11-01 PROCEDURE — 94726 PLETHYSMOGRAPHY LUNG VOLUMES: CPT | Performed by: PHYSICIAN ASSISTANT

## 2022-11-01 PROCEDURE — 80048 BASIC METABOLIC PNL TOTAL CA: CPT | Performed by: PHYSICIAN ASSISTANT

## 2022-11-01 RX ORDER — TIRZEPATIDE 5 MG/.5ML
5 INJECTION, SOLUTION SUBCUTANEOUS WEEKLY
COMMUNITY

## 2022-11-01 NOTE — ADDENDUM NOTE
Encounter addended by: Arlet Hoffmann, RegSched Rep on: 11/1/2022 2:06 PM   Actions taken: Order list changed, Diagnosis association updated

## 2022-11-01 NOTE — ADDENDUM NOTE
Encounter addended by: Arlet Hoffmann, Corine Rep on: 11/1/2022 2:08 PM   Actions taken: Result filed, Charge Capture section accepted

## 2022-11-01 NOTE — PROGRESS NOTES
Heart Failure Clinic    Date: 11/01/22     Vitals:    11/01/22 1014   BP: 140/90   Pulse: 86   SpO2: 99%      Weight 228  Method of arrival: Ambulatory    Weighing self daily: Most days    Monitoring Heart Failure Zones: No    Today's HF Zone: Reviewed Zones green    Taking medications as prescribed: Yes    Edema No    Shortness of Air: Yes and No    Number of pillows used at night:<2    Educational Materials given:  Avs, Heart failure zone education, week 1 packet                                                                         ReDS Value: 28  25-35 Optimal Value Status      Leonardo Edge RN 11/01/22 10:20 EDT

## 2022-11-01 NOTE — PROGRESS NOTES
[facility]  DOROTEO Davis Stephanie J, APRN  [address] [place] [address]    Thank you for asking me to see Cas Mccabe for congestive heart failure.    HPI:     This is a 57 y.o. male with known past medical history of:  · HFrEF  · 2/2014 TTE: EF 30-35% [facility] showed 50% LAD lesion   · 12/2018 TTE LV diastolic dysfunction (grade II) consistent with pseudonormalization, EF 30%,   · 12/2021 TTE: EF 26-30%, LV cavity is moderately dilated  · 01/14/2022 Subcutaneous ICD placed by Dr. Kenny   · 6/20/2022 TTE: EF 36-40%, LV cavity is borderline dilated, LV diastolic function is consistent with grade 1 impaired relaxation.  · Nonischemic cardiomyopathy  · ASCVD  · [facility] 10/29/21 Single-vessel CAD involving the ostial LAD 80%, severe dilated nonischemic cardiomyopathy per [facility]  with  with LAD ALIZA  · Right occipital infarct  · Essential HTN  · Dyslipidemia  · Diabetes Mellitus      Cas Mccabe presents for today to establish care in the HF clinic  The patient is typically seen by Justa Ruiz APRN.  Patient's primary cardiologist is Dr. Resendez & group. He does also follow with Dr. Larsen.     • Last known EF 36-40%. Current medications prior to first visit directed toward underlying heart failure.     • Last known hospitalization and/or ED visit: October 2021 from October 5 through 7 during which time he was found to have acute right occipital infarction/right posterior cerebral artery stroke  • Accompanied by: Self          Initial visit data/details regarding:   • Dyspnea: Denies significant dyspnea on exertion/orthopnea  • Lower extremity swelling: Denies  • Abdominal swelling: Denies present protuberance  • Home weight: Stable and staying around 225  • Home BP: Reports his blood pressures normally run lower than 140s systolic and attributes today's pressure to not taking his morning medications  • Home heart rate: Log provided to  assist  • Daily activities of living: Performing independently and working at present  • Pillows/lying flat: 1 pillow on bed  • Mr. Mccabe is chest pain free and reports he is without shortness of breath. He considers himself to be in green zone for HF and making adequate urine.  He reports he is not requiring additional diuretics and feels he currently has an excellent quality of life with his heart failure.     Specialists:   • Cardiology: Dr. Resendez & team  • EP: Dr. Larsen        Review of Systems - Review of Systems   Constitutional: Negative for chills, decreased appetite, diaphoresis, fever and malaise/fatigue.   HENT: Negative for congestion, ear discharge and hearing loss.    Eyes: Negative for blurred vision, discharge, double vision and photophobia.   Cardiovascular: Negative for chest pain, claudication, cyanosis and dyspnea on exertion.   Respiratory: Negative for cough, hemoptysis, shortness of breath and sleep disturbances due to breathing.    Endocrine: Negative for cold intolerance and heat intolerance.   Hematologic/Lymphatic: Negative for bleeding problem.   Skin: Negative for color change, dry skin and nail changes.   Musculoskeletal: Negative for arthritis, back pain and falls.   Gastrointestinal: Negative for bloating, abdominal pain and anorexia.   Genitourinary: Negative for bladder incontinence, decreased libido and dysuria.   Neurological: Negative for aphonia, brief paralysis and difficulty with concentration.   Psychiatric/Behavioral: Negative for altered mental status, depression and hallucinations.   Allergic/Immunologic: Negative for environmental allergies, HIV exposure and hives.         All other systems were reviewed and were negative.    Patient Active Problem List   Diagnosis   • Hyperlipidemia   • Cardiomyopathy, dilated (HCC)   • Pain in joint of left shoulder   • Type 2 diabetes mellitus (HCC)   • Occipital infarction (HCC)   • ASCVD (arteriosclerotic cardiovascular disease)   •  Primary hypertension   • Chronic combined systolic and diastolic congestive heart failure (HCC)       family history includes Diabetes in his father and mother; Heart disease in his father; Kidney failure in his father and mother.     reports that he has never smoked. He has never used smokeless tobacco. He reports that he does not drink alcohol and does not use drugs.    No Known Allergies      Current Outpatient Medications:   •  apixaban (ELIQUIS) 5 MG tablet tablet, Take 1 tablet by mouth Every 12 (Twelve) Hours. Indications: Other - full anticoagulation, Disp: 180 tablet, Rfl: 3  •  atorvastatin (LIPITOR) 80 MG tablet, Take 1 tablet by mouth Every Night., Disp: 90 tablet, Rfl: 3  •  carvedilol (COREG) 12.5 MG tablet, Take 0.5 tablets by mouth 2 (Two) Times a Day With Meals., Disp: 180 tablet, Rfl: 3  •  clopidogrel (PLAVIX) 75 MG tablet, Take 1 tablet by mouth Daily., Disp: 90 tablet, Rfl: 3  •  empagliflozin (Jardiance) 10 MG tablet tablet, Take 1 tablet by mouth Daily., Disp: 30 tablet, Rfl: 0  •  glipizide (GLUCOTROL) 10 MG tablet, Take 10 mg by mouth 2 (Two) Times a Day Before Meals., Disp: , Rfl:   •  metFORMIN (GLUCOPHAGE) 1000 MG tablet, Take 1,000 mg by mouth 2 (Two) Times a Day With Meals., Disp: , Rfl:   •  sacubitril-valsartan (Entresto) 49-51 MG tablet, Take 1 tablet by mouth 2 (Two) Times a Day., Disp: 60 tablet, Rfl: 3  •  spironolactone (ALDACTONE) 25 MG tablet, Take 1 tablet by mouth Daily., Disp: 90 tablet, Rfl: 3  •  Tirzepatide (Mounjaro) 5 MG/0.5ML solution pen-injector, Inject 5 mg under the skin into the appropriate area as directed 1 (One) Time Per Week., Disp: , Rfl:       Physical Exam:  I have reviewed the patient's current vital signs as documented in the patient's EMR.   Vitals:    11/01/22 1014   BP: 140/90   Pulse: 86   SpO2: 99%     Body mass index is 33.67 kg/m².       11/01/22  1014   Weight: 103 kg (228 lb)      Physical Exam  Vitals and nursing note reviewed.   Constitutional:        General: He is awake.      Appearance: Normal appearance. He is well-developed and well-groomed.   HENT:      Head: Normocephalic and atraumatic.   Eyes:      General: Lids are normal.      Conjunctiva/sclera:      Right eye: Right conjunctiva is not injected.      Left eye: Left conjunctiva is not injected.   Cardiovascular:      Rate and Rhythm: Normal rate and regular rhythm.      Heart sounds: S1 normal and S2 normal.   Pulmonary:      Effort: No tachypnea or bradypnea.      Breath sounds: No decreased breath sounds, wheezing, rhonchi or rales.   Abdominal:      General: Bowel sounds are normal.      Palpations: Abdomen is soft.      Tenderness: There is no abdominal tenderness.   Musculoskeletal:      Right lower leg: No swelling. No edema.      Left lower leg: No swelling. No edema.   Skin:     General: Skin is warm and moist.   Neurological:      Mental Status: He is alert and oriented to person, place, and time.   Psychiatric:         Attention and Perception: Attention normal.         Mood and Affect: Mood normal.         Behavior: Behavior is cooperative.         JVP: Volume/Pulsation: Normal.        DATA REVIEWED:         ---------------------------------------------------  TTE/KATHY:  Results for orders placed in visit on 06/13/22    Adult Transthoracic Echo Complete W/ Cont if Necessary Per Protocol    Interpretation Summary  · Left ventricular ejection fraction appears to be 36 - 40%. Left ventricular systolic function is moderately decreased.  · The left ventricular cavity is borderline dilated.  · Left ventricular diastolic function is consistent with (grade I) impaired relaxation.  · Compared to his previous study LV systolic function has improved from 30 to 40% and LV chamber dimension has also improved.        -----------------------------------------------------  CXR/Imaging:   Imaging Results (Most Recent)     None          I personally reviewed and interpreted the CXR.       -----------------------------------------------------  CT:   No radiology results for the last 30 days.  I personally reviewed the images of the CT scan.  My personal interpretation is below.      ----------------------------------------------------    PFTs:    None available at present  --------------------------------------------------------------------------------------------------    Laboratory evaluations:    Lab Results   Component Value Date    GLUCOSE 241 (H) 11/01/2022    BUN 19 11/01/2022    CREATININE 0.74 (L) 11/01/2022    EGFRIFNONA 107 01/14/2022    BCR 25.7 (H) 11/01/2022    K 4.3 11/01/2022    CO2 26.0 11/01/2022    CALCIUM 9.6 11/01/2022    ALBUMIN 3.90 10/19/2021    LABIL2 1.5 01/27/2014    AST 13 10/19/2021    ALT 19 10/19/2021     Lab Results   Component Value Date    WBC 7.80 01/14/2022    HGB 15.5 01/14/2022    HCT 46.7 01/14/2022    MCV 86.3 01/14/2022     01/14/2022     Lab Results   Component Value Date    CHOL 119 10/28/2021    CHLPL 282 (H) 06/19/2014    TRIG 136 10/28/2021    HDL 36 (L) 10/28/2021    LDL 59 10/28/2021     Lab Results   Component Value Date    TSH 0.811 12/20/2018    Q0OQVOP 7.8 01/31/2014     Lab Results   Component Value Date    HGBA1C 10.70 (H) 10/28/2021     Lab Results   Component Value Date    ALT 19 10/19/2021     Lab Results   Component Value Date    HGBA1C 10.70 (H) 10/28/2021    HGBA1C 11.20 (H) 10/06/2021    HGBA1C 10.00 (H) 12/20/2018     Lab Results   Component Value Date    CREATININE 0.74 (L) 11/01/2022     No results found for: IRON, TIBC, FERRITIN  Lab Results   Component Value Date    INR 0.91 01/14/2022    INR 0.90 10/05/2021    INR 0.90 12/19/2018    PROTIME 12.0 01/14/2022    PROTIME 12.6 (L) 10/05/2021    PROTIME 12.4 12/19/2018           PAH RISK ASSESSMENT:      1. Chronic combined systolic and diastolic congestive heart failure (HCC)    2. ASCVD (arteriosclerotic cardiovascular disease)    3. Cardiomyopathy, dilated (HCC)    4.  Hyperlipidemia, unspecified hyperlipidemia type          ORDERS PLACED TODAY:  Orders Placed This Encounter   Procedures   • Basic Metabolic Panel   • proBNP   • Magnesium   • Basic Metabolic Panel   • proBNP   • Magnesium        Diagnoses and all orders for this visit:    1. Chronic combined systolic and diastolic congestive heart failure (HCC) (Primary)  -     Basic Metabolic Panel; Future  -     proBNP; Future  -     Magnesium; Future  -     Basic Metabolic Panel; Standing  -     Basic Metabolic Panel  -     proBNP; Standing  -     proBNP  -     Magnesium; Standing  -     Magnesium    2. ASCVD (arteriosclerotic cardiovascular disease)  Overview:  · [facility] 10-28-21: The 80% ostial LAD stenosis is status post intervention with a Xience Florecita 4.0 x 8 mm drug-eluting stent      3. Cardiomyopathy, dilated (HCC)  Overview:  · Echo 2-: EF 30-35%.  Left heart catheterization showing 50% LAD lesion  · Echo 12-: Left ventricular diastolic dysfunction (grade II) consistent with pseudonormalization. Calculated EF = 30%. Estimated EF was in agreement with the calculated EF. Normal left ventricular wall thickness noted.  · Echo 12-7-21: Left ventricular ejection fraction appears to be 26 - 30%. Left ventricular systolic function is severely decreased. The left ventricular cavity is moderately dilated.      4. Hyperlipidemia, unspecified hyperlipidemia type           MEDS ORDERED TODAY:    No orders of the defined types were placed in this encounter.       ---------------------------------------------------------------------------------------------------------------------------          ASSESSMENT/PLAN:      Diagnosis Plan   1. Chronic combined systolic and diastolic congestive heart failure (HCC)  Basic Metabolic Panel    proBNP    Magnesium    Basic Metabolic Panel    Basic Metabolic Panel    proBNP    proBNP    Magnesium    Magnesium      2. ASCVD (arteriosclerotic cardiovascular disease)        3.  Cardiomyopathy, dilated (HCC)        4. Hyperlipidemia, unspecified hyperlipidemia type            not acutely decompensated chronic moderate systolic and diastolic heart failure. CHF.     NYHA stage Stage C: Structural heart disease is present AND symptoms have occurred or Stage D: Presence of advanced heart disease with continued heart failure symptoms requiring aggressive medical therapyFC-Class II: Slight limitation of physical activity. Comfortable at rest Ordinary physical activity results in fatigue, palpitation, dyspnea (shortness of breath).    Today, Patient appears euvolemic.and with  Moderate perfusion. The patient's hemodynamics are currently acceptable. HR is: normal and is at goal. BP/MAP was reviewed and there isroom for medication up-titration. However, will hold off on adjusting as he has not had his morning medications.     CHF GOAL DIRECTED MEDICAL THERAPY FOR PATIENT ADDRESSED/ADJUSTED:     GDMT    Drug Class   Drug   Dose Last Dose Adjustment Additional Titration   Notes   ACEi/ARB/ARNI Entresto 49-51mg BID      Beta Blocker  Carvedilol   6.25 mg BID      MRA Spironolactone 25mg qd      SGLT2i Jardiance 10mg qd  N/A      -CHF Specific BB:   •  Continue Coreg 6.25mg BID.    • Encouraged keeping daily BP log to assist with titration.   We discussed processes/benefits of HF clinic including nursing, pharmacist, and provider evaluation during each visit with ability for in office ReDS vest, labs, and ability to provide IV diuresis in the clinic with close outpatient monitoring.  Additionally, patient was educated about the availability of delivery of medications to patient's clinic room prior to leaving the building which assists with medication compliance and insures medications are in hands when changes are made (if patient opts for apothecary usage) with thorough guidance regarding changes and medication schedule provided.     -ACE/ARB/ARNi:   • Current dose: Entresto 49-51mg BID   • Continue  current dose with encouragement to keep BP log.   • BMP reviewed with acceptable renal function.     -MRA:   • The patient is FC-NYHA Class IV and MRA is indicated.   • Spironolactone 25 mg continued daily.   • BMP unremarkable.   • Patient was advised to not use potassium products.  • Quarterly monitoring of potassium and renal function is currently recommended    -SGLT2 inhibitor therapy:   • A BMP at initiation to verify GFR >30 was recommended, as was interval GFR surveillance.  • The patient was advised to hold SGLT2I when PO intake is restricted due to a planned surgery, or due to an underlying illness.    • Recommend continuing  Jardiance (empagliflozin) 10mg with quarterly assessment of GFR  • BMP checked today with acceptable eGFR.   • Pt was advised SEs, some severe, including hypersensitivity and Alesia's; coupled with discussion regarding common side effects of UTIs and female genital mycotic infections were discussed. If you will be NPO, or are sick (poor PO intake, N&V) please hold the medication until you are back to a normal diet.     -Diuretic regimen:   • ReDS Vest reading for 11/01/22 28; ReDs Vest reading reviewed with patient.    • Continue daily MRA.   • BMP, Mag, & ProBNP reviewed with patient.      -Fluid restriction/Sodium restriction:   • Requested 2000 ml restriction  • Patient has been asked to weigh daily and was provided with a printed diuretic strategy.  • 1,500 mg Na restriction was discussed.    -Acute vs. Chronic underlying conditions other than HF addressed during visit:   • History of right occipital infarction  o Continue statin.   o Continue Eliquis & Plavix    • Hyperglycemia with Diabetes mellitus type 2  o Continue Jardiance & Metformin.   o Started Mounjaro; encouraged compliance.   o Encouraged dietary compliance.     • Arteriosclerotic cardiovascular disease  o Continue statin & Plavix.     • Identifiable barriers to Heart Failure Self-care:   o Medical Barriers: No  current known medical barriers.   o Social Barriers: No known social barriers at the time of evaluation        CONSIDERATIONS:   · Consider sleep study referral in future visit.       BMI is >= 30 and <35. (Class 1 Obesity). The following options were offered after discussion;: pharmacological intervention options: Continue Mounjaro therapy.               45 minutes out of 60 minutes face to face spent counseling patient extensively on dietary Na+ intake, importance of activity, weight monitoring, compliance with medications in addition to importance of titration with goal directed medical therapy and follow up appointments.            This document has been electronically signed by Liv Bailey PA-C  November 1, 2022 12:52 EDT      Dictated Utilizing Dragon Dictation: Part of this note may be an electronic transcription/translation of spoken language to printed text using the Dragon Dictation System.    Follow-up appointment and medication changes provided in hand delivered After Visit Summary as well as reviewed in the room.

## 2022-11-01 NOTE — PROGRESS NOTES
Heart Failure Clinic  Pharmacist Note     Cas Mccabe is a 57 y.o. male seen in the Heart Failure Clinic for HFrEF, EF 36-40%.  Cas Mccabe reports a decent understanding of medications. Patient's wife is in charge of the patient's medications. She was on the phone during the visit and confirmed all the medications. She reports he does not miss any doses of his medications. Patient is not on a diuretic and denies any problems with SOB or swelling. Patient's BP today was 140/90 and HR was 86.       Medication Use:   Hx of med intolerances: None related to HF  Retail Rx Management:     Past Medical History:   Diagnosis Date   • CAD (coronary artery disease)    • Diabetes mellitus (HCC)    • Hyperlipidemia    • Hypertension      ALLERGIES: Patient has no known allergies.  Current Outpatient Medications   Medication Sig Dispense Refill   • apixaban (ELIQUIS) 5 MG tablet tablet Take 1 tablet by mouth Every 12 (Twelve) Hours. Indications: Other - full anticoagulation 180 tablet 3   • atorvastatin (LIPITOR) 80 MG tablet Take 1 tablet by mouth Every Night. 90 tablet 3   • carvedilol (COREG) 12.5 MG tablet Take 0.5 tablets by mouth 2 (Two) Times a Day With Meals. 180 tablet 3   • clopidogrel (PLAVIX) 75 MG tablet Take 1 tablet by mouth Daily. 90 tablet 3   • empagliflozin (Jardiance) 10 MG tablet tablet Take 1 tablet by mouth Daily. 30 tablet 0   • glipizide (GLUCOTROL) 10 MG tablet Take 10 mg by mouth 2 (Two) Times a Day Before Meals.     • metFORMIN (GLUCOPHAGE) 1000 MG tablet Take 1,000 mg by mouth 2 (Two) Times a Day With Meals.     • sacubitril-valsartan (Entresto) 49-51 MG tablet Take 1 tablet by mouth 2 (Two) Times a Day. 60 tablet 3   • spironolactone (ALDACTONE) 25 MG tablet Take 1 tablet by mouth Daily. 90 tablet 3   • Tirzepatide (Mounjaro) 5 MG/0.5ML solution pen-injector Inject 5 mg under the skin into the appropriate area as directed 1 (One) Time Per Week.       No current facility-administered  "medications for this encounter.       Vaccination History:   Pneumonia: Does not want   Annual Influenza: Does not want   COVID 19: Primary series no boosters, does not want boosters     Objective  Vitals:    11/01/22 1014   BP: 140/90   BP Location: Right arm   Patient Position: Sitting   Cuff Size: Adult   Pulse: 86   SpO2: 99%   Weight: 103 kg (228 lb)   Height: 175.3 cm (69\")     Wt Readings from Last 3 Encounters:   11/01/22 103 kg (228 lb)   10/17/22 102 kg (225 lb 12.8 oz)   06/13/22 107 kg (235 lb 3.2 oz)         11/01/22  1014   Weight: 103 kg (228 lb)     Lab Results   Component Value Date    GLUCOSE 248 (H) 01/14/2022    BUN 20 01/14/2022    CREATININE 0.76 01/14/2022    EGFRIFNONA 107 01/14/2022    BCR 26.3 (H) 01/14/2022    K 4.3 01/14/2022    CO2 26.0 01/14/2022    CALCIUM 9.8 01/14/2022    ALBUMIN 3.90 10/19/2021    LABIL2 1.5 01/27/2014    AST 13 10/19/2021    ALT 19 10/19/2021     Lab Results   Component Value Date    WBC 7.80 01/14/2022    HGB 15.5 01/14/2022    HCT 46.7 01/14/2022    MCV 86.3 01/14/2022     01/14/2022     Lab Results   Component Value Date    CKTOTAL 77 01/08/2017    CKMB 3.76 01/08/2017    CKMBINDEX 4.9 (H) 01/08/2017    TROPONINI 0.011 12/20/2018    TROPONINT <0.010 10/06/2021     Lab Results   Component Value Date    PROBNP 1,542.0 (H) 10/05/2021     Results for orders placed in visit on 06/13/22    Adult Transthoracic Echo Complete W/ Cont if Necessary Per Protocol    Interpretation Summary  · Left ventricular ejection fraction appears to be 36 - 40%. Left ventricular systolic function is moderately decreased.  · The left ventricular cavity is borderline dilated.  · Left ventricular diastolic function is consistent with (grade I) impaired relaxation.  · Compared to his previous study LV systolic function has improved from 30 to 40% and LV chamber dimension has also improved.         GDMT    Drug Class   Drug   Dose Last Dose Adjustment Additional Titration   Notes "   ACEi/ARB/ARNI Entresto  49-51 mg BID  Needed     Beta Blocker Carvedilol  6.25 mg BID  Needed     MRA Spironolactone  25 mg Daily       SGLT2i Jardiance  10 mg Daily   N/A        Drug Therapy Problems    1. Drug Interactions Screening  2. Drug-Disease Interactions: ibuprofen PRN  3. GDTM  4. Vaccines  5. BP, HR, weight logs     Recommendations:     1. Patient does not have any significant drug interactions.     2. Encouraged patient to take tylenol for pain rather than ibuprofen or other NSAIDs.   3. Could consider increasing carvedilol dose today. Would defer increasing Entresto until next visit when patient has BP logs.   4. Offered patient pneumonia, covid, and influenza vaccine. Patient stated he did not want any vaccines today.    5. Encouraged patient to take daily logs of his BP, HR, and weight in order to properly titrate medications at next visit. Patient was agreeable.     Discharge medications have been reviewed and reconciled.    Patient was educated on heart failure medications and the importance of medication adherence. All questions were addressed and patient expressed understanding. Used teach-back method to assess understanding.     Thank you for allowing me to participate in the care of your patient,    Leanne Vince SABRINA  11/01/22  10:43 EDT

## 2022-11-02 RX ORDER — CLOPIDOGREL BISULFATE 75 MG/1
TABLET ORAL
Qty: 30 TABLET | Refills: 6 | Status: SHIPPED | OUTPATIENT
Start: 2022-11-02

## 2022-11-21 ENCOUNTER — HOSPITAL ENCOUNTER (OUTPATIENT)
Dept: CARDIOLOGY | Facility: HOSPITAL | Age: 57
Discharge: HOME | End: 2022-11-21
Admitting: NURSE PRACTITIONER

## 2022-11-21 PROCEDURE — 93306 TTE W/DOPPLER COMPLETE: CPT

## 2022-11-21 PROCEDURE — 93306 TTE W/DOPPLER COMPLETE: CPT | Performed by: INTERNAL MEDICINE

## 2022-11-28 LAB
BH CV ECHO MEAS - ACS: 1.8 CM
BH CV ECHO MEAS - AO MAX PG: 7.7 MMHG
BH CV ECHO MEAS - AO MEAN PG: 5 MMHG
BH CV ECHO MEAS - AO ROOT DIAM: 3.2 CM
BH CV ECHO MEAS - AO V2 MAX: 139 CM/SEC
BH CV ECHO MEAS - AO V2 VTI: 27.5 CM
BH CV ECHO MEAS - EDV(CUBED): 117.6 ML
BH CV ECHO MEAS - EDV(MOD-SP4): 64.9 ML
BH CV ECHO MEAS - EF(MOD-SP4): 59.5 %
BH CV ECHO MEAS - ESV(CUBED): 27 ML
BH CV ECHO MEAS - ESV(MOD-SP4): 26.3 ML
BH CV ECHO MEAS - FS: 38.8 %
BH CV ECHO MEAS - IVS/LVPW: 0.56 CM
BH CV ECHO MEAS - IVSD: 1 CM
BH CV ECHO MEAS - LA DIMENSION: 2.8 CM
BH CV ECHO MEAS - LAT PEAK E' VEL: 6.3 CM/SEC
BH CV ECHO MEAS - LV DIASTOLIC VOL/BSA (35-75): 29.7 CM2
BH CV ECHO MEAS - LV MASS(C)D: 282.6 GRAMS
BH CV ECHO MEAS - LV SYSTOLIC VOL/BSA (12-30): 12 CM2
BH CV ECHO MEAS - LVIDD: 4.9 CM
BH CV ECHO MEAS - LVIDS: 3 CM
BH CV ECHO MEAS - LVOT AREA: 3.1 CM2
BH CV ECHO MEAS - LVOT DIAM: 2 CM
BH CV ECHO MEAS - LVPWD: 1.8 CM
BH CV ECHO MEAS - MED PEAK E' VEL: 6.7 CM/SEC
BH CV ECHO MEAS - MV A MAX VEL: 74.6 CM/SEC
BH CV ECHO MEAS - MV E MAX VEL: 64.7 CM/SEC
BH CV ECHO MEAS - MV E/A: 0.87
BH CV ECHO MEAS - PA ACC TIME: 0.09 SEC
BH CV ECHO MEAS - PA PR(ACCEL): 37.6 MMHG
BH CV ECHO MEAS - RAP SYSTOLE: 10 MMHG
BH CV ECHO MEAS - RVSP: 24.9 MMHG
BH CV ECHO MEAS - SI(MOD-SP4): 17.7 ML/M2
BH CV ECHO MEAS - SV(MOD-SP4): 38.6 ML
BH CV ECHO MEAS - TAPSE (>1.6): 2.02 CM
BH CV ECHO MEAS - TR MAX PG: 14.9 MMHG
BH CV ECHO MEAS - TR MAX VEL: 193 CM/SEC
BH CV ECHO MEASUREMENTS AVERAGE E/E' RATIO: 9.95
LEFT ATRIUM VOLUME INDEX: 15.6 ML/M2
MAXIMAL PREDICTED HEART RATE: 165 BPM
STRESS TARGET HR: 140 BPM

## 2022-12-02 ENCOUNTER — TELEPHONE (OUTPATIENT)
Dept: CARDIOLOGY | Facility: CLINIC | Age: 57
End: 2022-12-02

## 2022-12-02 NOTE — TELEPHONE ENCOUNTER
Spoke with patient and informed him echo shows improved LV function. It is now 51 to 55%. Informed patient to continue current medications.   -----------------------------------------------------    ----- Message from ROSA MARIA Metz sent at 12/2/2022  9:20 AM EST -----  Please call patient    Echocardiogram shows improved LV function.  It is now 51 to 55%.  Continue current medications.    Thanks, Nayeli

## 2022-12-15 PROCEDURE — 93295 DEV INTERROG REMOTE 1/2/MLT: CPT | Performed by: INTERNAL MEDICINE

## 2022-12-15 PROCEDURE — 93296 REM INTERROG EVL PM/IDS: CPT | Performed by: INTERNAL MEDICINE

## 2023-01-03 ENCOUNTER — APPOINTMENT (OUTPATIENT)
Dept: CARDIOLOGY | Facility: HOSPITAL | Age: 58
End: 2023-01-03

## 2023-03-16 PROCEDURE — 93295 DEV INTERROG REMOTE 1/2/MLT: CPT | Performed by: INTERNAL MEDICINE

## 2023-03-16 PROCEDURE — 93296 REM INTERROG EVL PM/IDS: CPT | Performed by: INTERNAL MEDICINE

## 2023-05-02 ENCOUNTER — OFFICE VISIT (OUTPATIENT)
Dept: CARDIOLOGY | Facility: CLINIC | Age: 58
End: 2023-05-02
Payer: COMMERCIAL

## 2023-05-02 VITALS
HEART RATE: 89 BPM | DIASTOLIC BLOOD PRESSURE: 75 MMHG | WEIGHT: 216.2 LBS | BODY MASS INDEX: 32.02 KG/M2 | OXYGEN SATURATION: 98 % | SYSTOLIC BLOOD PRESSURE: 118 MMHG | HEIGHT: 69 IN

## 2023-05-02 DIAGNOSIS — I10 PRIMARY HYPERTENSION: Primary | Chronic | ICD-10-CM

## 2023-05-02 DIAGNOSIS — I50.42 CHRONIC COMBINED SYSTOLIC AND DIASTOLIC CONGESTIVE HEART FAILURE: Chronic | ICD-10-CM

## 2023-05-02 DIAGNOSIS — I25.10 ASCVD (ARTERIOSCLEROTIC CARDIOVASCULAR DISEASE): Chronic | ICD-10-CM

## 2023-05-02 DIAGNOSIS — E78.2 MIXED HYPERLIPIDEMIA: Chronic | ICD-10-CM

## 2023-05-02 DIAGNOSIS — I42.0 CARDIOMYOPATHY, DILATED: Chronic | ICD-10-CM

## 2023-05-02 PROBLEM — Z86.73 HISTORY OF CVA (CEREBROVASCULAR ACCIDENT): Chronic | Status: ACTIVE | Noted: 2022-11-01

## 2023-05-02 PROBLEM — E78.5 HYPERLIPIDEMIA: Chronic | Status: ACTIVE | Noted: 2017-07-07

## 2023-05-02 PROBLEM — I63.9 OCCIPITAL INFARCTION: Chronic | Status: ACTIVE | Noted: 2021-10-05

## 2023-05-02 RX ORDER — ICOSAPENT ETHYL 1000 MG/1
1 CAPSULE ORAL DAILY
COMMUNITY
Start: 2023-03-22

## 2023-05-02 RX ORDER — CARVEDILOL 6.25 MG/1
6.25 TABLET ORAL 2 TIMES DAILY
COMMUNITY
Start: 2023-03-27

## 2023-05-02 NOTE — PROGRESS NOTES
"Chief Complaint  Follow-up (6 month follow up, echo) and Med Management (Verbally reviewed medications with patient, patient is tolerating medications well. )    Subjective          Cas Venancio Mccabe presents to [facility] CARDIOLOGY for follow up.    History of Present Illness    Ousmane was last seen in clinic on 10/17/2022 by ROSA MARIA Cruz.  He was stable at that time and no changes were made to his medications.  He was referred to the heart failure clinic and an echocardiogram was ordered.  On 11/28/2022 echocardiogram showed LVEF of 51 to 55% and no significant valvular abnormalities.  This was an improvement from LVEF of 36 to 40% on 6/20/2022.  Patient was seen on November 1, 2022 in the heart failure clinic.  No changes were made to his medications at that time.    At today's visit Mr. Mccabe states that he has been doing well.  He denies any chest pain.  He denies shortness of breath or dyspnea on exertion.  He denies PND and orthopnea.  He denies lower extremity swelling.  He reports medication compliance.  He has no questions or concerns today.    Objective     Vital Signs:   /75 (BP Location: Left arm, Patient Position: Sitting, Cuff Size: Adult)   Pulse 89   Ht 175.3 cm (69\")   Wt 98.1 kg (216 lb 3.2 oz)   SpO2 98%   BMI 31.93 kg/m²       Physical Exam  Constitutional:       Appearance: Normal appearance. He is well-developed.   Cardiovascular:      Rate and Rhythm: Normal rate and regular rhythm.      Heart sounds: No murmur heard.    No friction rub. No gallop.   Pulmonary:      Effort: Pulmonary effort is normal. No respiratory distress.      Breath sounds: Normal breath sounds. No wheezing or rales.   Skin:     General: Skin is warm and dry.   Neurological:      Mental Status: He is alert and oriented to person, place, and time.   Psychiatric:         Mood and Affect: Mood normal.         Behavior: Behavior normal.          Result Review :     CMP        11/1/2022 "    10:33   CMP   Glucose 241     BUN 19     Creatinine 0.74     EGFR 107.0     Sodium 138     Potassium 4.3     Chloride 104     Calcium 9.6     BUN/Creatinine Ratio 25.7     Anion Gap 8.0               Data reviewed: Cardiology studies Transthoracic echocardiogram from 11/28/2022         Current Outpatient Medications   Medication Sig Dispense Refill   • apixaban (ELIQUIS) 5 MG tablet tablet Take 1 tablet by mouth Every 12 (Twelve) Hours. Indications: Other - full anticoagulation 180 tablet 3   • atorvastatin (LIPITOR) 80 MG tablet Take 1 tablet by mouth Every Night. 90 tablet 3   • carvedilol (COREG) 6.25 MG tablet Take 1 tablet by mouth 2 (Two) Times a Day.     • clopidogrel (PLAVIX) 75 MG tablet TAKE ONE TABLET BY MOUTH DAILY 30 tablet 6   • empagliflozin (Jardiance) 10 MG tablet tablet Take 1 tablet by mouth Daily. 30 tablet 0   • glipizide (GLUCOTROL) 10 MG tablet Take 1 tablet by mouth 2 (Two) Times a Day Before Meals.     • icosapent ethyl (VASCEPA) 1 g capsule capsule Take 1 g by mouth Daily.     • metFORMIN (GLUCOPHAGE) 1000 MG tablet Take 1 tablet by mouth 2 (Two) Times a Day With Meals.     • sacubitril-valsartan (Entresto) 49-51 MG tablet Take 1 tablet by mouth 2 (Two) Times a Day. 60 tablet 3   • spironolactone (ALDACTONE) 25 MG tablet Take 1 tablet by mouth Daily. 90 tablet 3   • Tirzepatide (Mounjaro) 5 MG/0.5ML solution pen-injector Inject 5 mg under the skin into the appropriate area as directed 1 (One) Time Per Week.       No current facility-administered medications for this visit.            Assessment and Plan    Problem List Items Addressed This Visit        Cardiac and Vasculature    Hyperlipidemia (Chronic)    Relevant Medications    icosapent ethyl (VASCEPA) 1 g capsule capsule    Cardiomyopathy, dilated (HCC) (Chronic)    Overview     · Echo 2-: EF 30-35%.  Left heart catheterization showing 50% LAD lesion  · Echo 12-: Left ventricular diastolic dysfunction (grade II)  consistent with pseudonormalization. Calculated EF = 30%. Estimated EF was in agreement with the calculated EF. Normal left ventricular wall thickness noted.  · Echo 12-7-21: Left ventricular ejection fraction appears to be 26 - 30%. Left ventricular systolic function is severely decreased. The left ventricular cavity is moderately dilated.  · 6/28/2022 TTE: LVEF 36 to 40%, grade 1 diastolic dysfunction  · 11/28/2022 TTE: LVEF 51 to 55%          Relevant Medications    carvedilol (COREG) 6.25 MG tablet    ASCVD (arteriosclerotic cardiovascular disease) (Chronic)    Overview     · [facility] 10-28-21: The 80% ostial LAD stenosis is status post intervention with a Xience Florecita 4.0 x 8 mm drug-eluting stent         Primary hypertension - Primary (Chronic)    Relevant Medications    carvedilol (COREG) 6.25 MG tablet    Chronic combined systolic and diastolic congestive heart failure (Chronic)    Relevant Medications    carvedilol (COREG) 6.25 MG tablet           Follow Up     Medications were reviewed with the patient.    ASCVD is stable.  Patient is to continue Plavix, carvedilol, atorvastatin.    Systolic and diastolic congestive heart failure are stable.  Patient shows no signs of acute decompensated cardiomyopathy.  Continue GDMT including carvedilol, Jardiance, Entresto, and Aldactone.    Continue atorvastatin for hyperlipidemia.  Continue Vascepa.    Hypertension is controlled continue current medications.    Return in about 6 months (around 11/2/2023).    Patient was given instructions and counseling regarding his condition or for health maintenance advice. Please see specific information pulled into the AVS if appropriate.

## 2024-02-22 ENCOUNTER — OFFICE VISIT (OUTPATIENT)
Dept: CARDIOLOGY | Facility: CLINIC | Age: 59
End: 2024-02-22
Payer: COMMERCIAL

## 2024-02-22 VITALS
HEIGHT: 69 IN | DIASTOLIC BLOOD PRESSURE: 84 MMHG | HEART RATE: 87 BPM | SYSTOLIC BLOOD PRESSURE: 147 MMHG | WEIGHT: 221.6 LBS | OXYGEN SATURATION: 97 % | BODY MASS INDEX: 32.82 KG/M2

## 2024-02-22 DIAGNOSIS — I10 PRIMARY HYPERTENSION: Chronic | ICD-10-CM

## 2024-02-22 DIAGNOSIS — I50.42 CHRONIC COMBINED SYSTOLIC AND DIASTOLIC CONGESTIVE HEART FAILURE: Chronic | ICD-10-CM

## 2024-02-22 DIAGNOSIS — I42.0 CARDIOMYOPATHY, DILATED: Chronic | ICD-10-CM

## 2024-02-22 DIAGNOSIS — R07.2 PRECORDIAL PAIN: ICD-10-CM

## 2024-02-22 DIAGNOSIS — I25.10 ASCVD (ARTERIOSCLEROTIC CARDIOVASCULAR DISEASE): Primary | Chronic | ICD-10-CM

## 2024-02-22 DIAGNOSIS — Z95.810 PRESENCE OF IMPLANTABLE CARDIOVERTER-DEFIBRILLATOR (ICD): Chronic | ICD-10-CM

## 2024-02-22 DIAGNOSIS — E78.5 DYSLIPIDEMIA, GOAL LDL BELOW 70: ICD-10-CM

## 2024-02-22 PROBLEM — Z95.0 PRESENCE OF CARDIAC PACEMAKER: Status: ACTIVE | Noted: 2024-02-22

## 2024-02-22 PROBLEM — Z95.0 PRESENCE OF CARDIAC PACEMAKER: Chronic | Status: ACTIVE | Noted: 2024-02-22

## 2024-02-22 RX ORDER — ISOSORBIDE MONONITRATE 30 MG/1
30 TABLET, EXTENDED RELEASE ORAL DAILY
Qty: 30 TABLET | Refills: 11 | Status: SHIPPED | OUTPATIENT
Start: 2024-02-22

## 2024-02-22 NOTE — PROGRESS NOTES
"Chief Complaint  Cardiomyopathy (Hypertension, patient states some \"pressure \" in chest lately , palpitations )    Subjective          Cas Mccabe presents to [facility] CARDIOLOGY for follow up.    History of Present Illness    Ousmane was last seen in clinic on 5/2/2023.  At that visit he was stable from a cardiac standpoint and no changes were made to his medications.    At today's visit Ousmane reports that he has been having some sensation of chest pressure.  He reports that he sometimes has associated palpitations.  He did walk from the parking lot through the main hospital doors, took the steps up to the second floor and walked down the hussein this morning.  He reports that the pressure sensation increased slightly during that time.  He further reports that he has this continued pressure-like sensation after rest.  He denies any associated symptoms of nausea.    Patient does have an ICD.  It was last remotely interrogated in December 2023.  He denies any discharge from ICD.  He will need in - person follow up with Dr Larsen.      Objective     Vital Signs:   /84 (BP Location: Left arm, Patient Position: Sitting, Cuff Size: Adult)   Pulse 87   Ht 175.3 cm (69\")   Wt 101 kg (221 lb 9.6 oz)   SpO2 97%   BMI 32.72 kg/m²       Physical Exam  Vitals reviewed.   Constitutional:       Appearance: Normal appearance. He is well-developed.   Cardiovascular:      Rate and Rhythm: Normal rate and regular rhythm.      Heart sounds: No murmur heard.     No friction rub. No gallop.   Pulmonary:      Effort: Pulmonary effort is normal. No respiratory distress.      Breath sounds: Normal breath sounds. No wheezing or rales.   Skin:     General: Skin is warm and dry.   Neurological:      Mental Status: He is alert and oriented to person, place, and time.   Psychiatric:         Mood and Affect: Mood normal.         Behavior: Behavior normal.          Result Review :            ECG 12 " Lead    Date/Time: 2/22/2024 8:23 AM  Performed by: Nayeli Yun APRN    Authorized by: Nayeli Yun APRN  Comparison: compared with previous ECG from 2/4/2022  Similar to previous ECG  Comparison to previous ECG: Normal sinus rhythm, 82 bpm, left axis deviation, nonspecific T wave abnormality and prolonged QTc of 467 ms  Rhythm: sinus rhythm  BPM: 84  Comments: QTc 404           Most recent echocardiogram  Results for orders placed in visit on 10/17/22    Adult Transthoracic Echo Complete W/ Cont if Necessary Per Protocol    Interpretation Summary    Left ventricular systolic function is normal. Left ventricular ejection fraction appears to be 51 - 55%.    Estimated right ventricular systolic pressure from tricuspid regurgitation is normal (<35 mmHg).    Pt LV systolic function improved compared to previous study      Most recent Stress Test  Results for orders placed during the hospital encounter of 10/05/21    Stress Test With Myocardial Perfusion One Day    Interpretation Summary  · Pt has multiple perfusion abnormalities, has a medium to large size inferior infarction with mild ischemia extending into inferolateral territory, also noted mild ischemia in the distal anteroapical territory.  · Diaphragmatic attenuation artifact is present.  · Left ventricular ejection fraction is moderately reduced. (Calculated EF = 25%) with severe globaly hypokinesia and apical dyskinesia  · Impressions are consistent with a high risk study.       Most recent Cardiac Cath  Results for orders placed during the hospital encounter of 10/28/21    Cardiac Catheterization/Vascular Study    Narrative  Table formatting from the original result was not included.  Images from the original result were not included.  [facility] Cardiology  [address] [place] [address], [address]  [place], [place], [id number] [phone]  [email or web address]          CARDIAC CATHETERIZATION PROCEDURE NOTE    Impression  · The 80% ostial  LAD stenosis is status post intervention with a Xience Florecita 4.0 x 8 mm drug-eluting stent  · Normal LVEDP of 5 mmHg    RECOMMENDATIONS:  · Dual antiplatelet therapy with aspirin and clopidogrel for 1 month.  Consider discontinuation of aspirin at follow-up in the cardiology office at [facility] to avoid triple therapy.  Resume Eliquis.  · Continue other cardiac medications.  Cardiac rehab referral.  Aggressive lifestyle and risk factor modification for CAD and HF.  · Follow-up with [facility] cardiology as previously scheduled on November 16, 2021    ----------------------------------------------------------------------------------------------------------------------    Indication(s) for this Procedure:  CCS class III angina, high risk stress test, cardiomyopathy.  Diagnostic angiogram performed earlier this month revealed an 80% ostial LAD stenosis.  The patient was scheduled for an elective PCI of the LAD at [facility] due to the availability of surgical backup.    Procedure(s) Performed:  1. Right radial artery access  2. Left heart catheterization  3. Selective coronary angiography of the left coronary system  4. Percutaneous coronary intervention and intravascular ultrasound of the LAD  5. Selective coronary angiography of the RCA    Description of the Procedure:  Informed consent was obtained with the goals, rationale, alternatives, risks and benefits of the procedure explained to the patient.    A 6Fr GlideSheath Slender sheath was placed in the right. Left heart catheterization without left ventriculography was performed with a 6Fr JL 3.5 guide catheter placed in the left ventricle.  Selective angiography of the left coronary arteries was performed with a 6Fr JL 3 guide catheter.    Due to a severe stenosis in the LAD coronary artery, it was decided to proceed with intervention.  Heparin was given for anticoagulation. A Sidney blue guidewire was used to cross the stenosis. A 3.0 mm balloon was  used to cross the lesion and inflated one time.  An intravascular ultrasound was performed.  A second Sidney blue guidewire was inserted into the circumflex to provide guide stability.  A Xience Florecita 4.0 x 8 mm drug eluting stent was placed.  Repeat intravascular ultrasound revealed a well opposed and well expanded stent.    Selective angiography of the right coronary artery was performed with a 5Fr JR4 diagnostic catheter.    The procedure was completed and the sheath was removed.  A radial hemostasis band was placed on the artery for hemostasis.    Angiographic Findings:  Right dominant coronary circulation  · Left main artery:   The vessel is normal size and bifurcates into an anterior descending and circumflex artery.  There is a 30% distal segment stenosis.  · Left anterior descending artery: The vessel is normal size and terminates in an apical artery.  There is an 80% ostial segment stenosis.  There is mild diffuse atherosclerosis of the distal segments.  The first diagonal branch is large sized without significant disease.  · Left circumflex artery: The vessel is normal size and terminates in an AV groove artery.  There is no significant disease.  The first obtuse marginal branch is large sized and without significant disease.  · Right coronary artery: The vessel is normal sized and bifurcates into an RPL S and RPDA.  There is mild atherosclerosis of the proximal segment.    Intravascular imaging:   Intravascular ultrasound was performed to size the vessel for stenting.  A repeat intravascular ultrasound revealed a well opposed and well expanded stent.    Hemodynamic Findings:  · Ao pressure:  72/51/59 mmHg  · LVEDP:  5 mmHg  · LV to Ao peak to peak pullback gradient: 15 mmHg    Post-interventional Results:  Lesion #1  · ACC AHA class lesion: Type B (angulation)  · Location:    Ostial LAD  · Stenosis pre-PCI:  80%  · Stenosis post-PCI:  0%  · RAMILA flow pre-PCI:  3  · RAMILA flow post-PCI:  3    Estimated Blood  Loss:  Minimal    Specimen(s):  None obtained    Sheath:  Removed, radial hemostasis band    Complications:  There were no apparent early complications.    Wisam Ledesma MD, MSc, FACC, [facility]  Interventional Cardiology  [facility]      Current Outpatient Medications   Medication Sig Dispense Refill    apixaban (ELIQUIS) 5 MG tablet tablet Take 1 tablet by mouth Every 12 (Twelve) Hours. Indications: Other - full anticoagulation 180 tablet 3    atorvastatin (LIPITOR) 80 MG tablet Take 1 tablet by mouth Every Night. 90 tablet 3    carvedilol (COREG) 6.25 MG tablet Take 1 tablet by mouth 2 (Two) Times a Day.      clopidogrel (PLAVIX) 75 MG tablet TAKE ONE TABLET BY MOUTH DAILY 30 tablet 6    empagliflozin (Jardiance) 10 MG tablet tablet Take 1 tablet by mouth Daily. 30 tablet 0    glipizide (GLUCOTROL) 10 MG tablet Take 1 tablet by mouth 2 (Two) Times a Day Before Meals.      icosapent ethyl (VASCEPA) 1 g capsule capsule Take 1 g by mouth Daily.      metFORMIN (GLUCOPHAGE) 1000 MG tablet Take 1 tablet by mouth 2 (Two) Times a Day With Meals.      sacubitril-valsartan (Entresto) 49-51 MG tablet Take 1 tablet by mouth 2 (Two) Times a Day. 60 tablet 3    spironolactone (ALDACTONE) 25 MG tablet Take 1 tablet by mouth Daily. 90 tablet 3    Tirzepatide (Mounjaro) 5 MG/0.5ML solution pen-injector Inject 0.5 mL under the skin into the appropriate area as directed 1 (One) Time Per Week.      isosorbide mononitrate (IMDUR) 30 MG 24 hr tablet Take 1 tablet by mouth Daily. 30 tablet 11     No current facility-administered medications for this visit.            Assessment and Plan    Problem List Items Addressed This Visit          Cardiac and Vasculature    Dyslipidemia, goal LDL below 55 (Chronic)    Overview     10/28/2021 total cholesterol 119, triglycerides 136, HDL 36, and LDL 59         Cardiomyopathy, dilated (Chronic)    Overview     Echo 2-: EF 30-35%.  Left heart catheterization  showing 50% LAD lesion  Echo 12-: Left ventricular diastolic dysfunction (grade II) consistent with pseudonormalization. Calculated EF = 30%. Estimated EF was in agreement with the calculated EF. Normal left ventricular wall thickness noted.  Echo 12-7-21: Left ventricular ejection fraction appears to be 26 - 30%. Left ventricular systolic function is severely decreased. The left ventricular cavity is moderately dilated.  6/28/2022 TTE: LVEF 36 to 40%, grade 1 diastolic dysfunction  11/28/2022 TTE: LVEF 51 to 55%          Relevant Medications    isosorbide mononitrate (IMDUR) 30 MG 24 hr tablet    Other Relevant Orders    Adult Transthoracic Echo Complete W/ Cont if Necessary Per Protocol    ASCVD (arteriosclerotic cardiovascular disease) - Primary (Chronic)    Overview     [facility] 10-28-21: The 80% ostial LAD stenosis is status post intervention with a Xience Florecita 4.0 x 8 mm drug-eluting stent         Primary hypertension (Chronic)    Chronic combined systolic and diastolic congestive heart failure (Chronic)    Relevant Medications    isosorbide mononitrate (IMDUR) 30 MG 24 hr tablet    Presence of implantable cardioverter-defibrillator (ICD) (Chronic)    Relevant Orders    Ambulatory Referral to Cardiac Electrophysiology     Other Visit Diagnoses       Precordial pain        Relevant Orders    Stress Test With Myocardial Perfusion One Day    Adult Transthoracic Echo Complete W/ Cont if Necessary Per Protocol                Follow Up     Medications were reviewed with the patient.    Due to patient's report of chest pain I have ordered a stress test and echocardiogram.  Additionally, I have started him on Imdur.    Continue carvedilol, Plavix, and atorvastatin for CAD.    Continue atorvastatin and Vascepa for dyslipidemia.  Request labs from PCP.    Continue Entresto, carvedilol, Jardiance, and Aldactone for ischemic cardiomyopathy/systolic heart failure.    Return in about 5 weeks (around  3/28/2024).    Patient was given instructions and counseling regarding his condition or for health maintenance advice. Please see specific information pulled into the AVS if appropriate.

## 2024-03-21 PROCEDURE — 93295 DEV INTERROG REMOTE 1/2/MLT: CPT | Performed by: INTERNAL MEDICINE

## 2024-03-21 PROCEDURE — 93296 REM INTERROG EVL PM/IDS: CPT | Performed by: INTERNAL MEDICINE

## 2024-04-02 ENCOUNTER — HOSPITAL ENCOUNTER (OUTPATIENT)
Dept: NUCLEAR MEDICINE | Facility: HOSPITAL | Age: 59
Discharge: HOME | End: 2024-04-02
Payer: COMMERCIAL

## 2024-04-02 ENCOUNTER — HOSPITAL ENCOUNTER (OUTPATIENT)
Dept: CARDIOLOGY | Facility: HOSPITAL | Age: 59
Discharge: HOME | End: 2024-04-02
Payer: COMMERCIAL

## 2024-04-02 DIAGNOSIS — I42.0 CARDIOMYOPATHY, DILATED: Chronic | ICD-10-CM

## 2024-04-02 DIAGNOSIS — R07.2 PRECORDIAL PAIN: ICD-10-CM

## 2024-04-02 PROCEDURE — 25010000002 REGADENOSON 0.4 MG/5ML SOLUTION: Performed by: NURSE PRACTITIONER

## 2024-04-02 PROCEDURE — A9500 TC99M SESTAMIBI: HCPCS | Performed by: NURSE PRACTITIONER

## 2024-04-02 PROCEDURE — 0 TECHNETIUM SESTAMIBI: Performed by: NURSE PRACTITIONER

## 2024-04-02 PROCEDURE — 78452 HT MUSCLE IMAGE SPECT MULT: CPT

## 2024-04-02 PROCEDURE — 93306 TTE W/DOPPLER COMPLETE: CPT

## 2024-04-02 PROCEDURE — 93017 CV STRESS TEST TRACING ONLY: CPT

## 2024-04-02 RX ORDER — REGADENOSON 0.08 MG/ML
0.4 INJECTION, SOLUTION INTRAVENOUS
Status: COMPLETED | OUTPATIENT
Start: 2024-04-02 | End: 2024-04-02

## 2024-04-02 RX ADMIN — TECHNETIUM TC 99M SESTAMIBI 1 DOSE: 1 INJECTION INTRAVENOUS at 09:00

## 2024-04-02 RX ADMIN — REGADENOSON 0.4 MG: 0.08 INJECTION INTRAVENOUS at 10:00

## 2024-04-02 RX ADMIN — TECHNETIUM TC 99M SESTAMIBI 1 DOSE: 1 INJECTION INTRAVENOUS at 10:00

## 2024-04-03 LAB
BH CV ECHO MEAS - ACS: 1.8 CM
BH CV ECHO MEAS - AO MAX PG: 7.6 MMHG
BH CV ECHO MEAS - AO MEAN PG: 5 MMHG
BH CV ECHO MEAS - AO ROOT DIAM: 3.4 CM
BH CV ECHO MEAS - AO V2 MAX: 138 CM/SEC
BH CV ECHO MEAS - AO V2 VTI: 26.9 CM
BH CV ECHO MEAS - EDV(CUBED): 132.7 ML
BH CV ECHO MEAS - EDV(MOD-SP4): 86.4 ML
BH CV ECHO MEAS - EF(MOD-SP4): 58 %
BH CV ECHO MEAS - ESV(CUBED): 50.7 ML
BH CV ECHO MEAS - ESV(MOD-SP4): 36.3 ML
BH CV ECHO MEAS - FS: 27.5 %
BH CV ECHO MEAS - IVS/LVPW: 0.83 CM
BH CV ECHO MEAS - IVSD: 1 CM
BH CV ECHO MEAS - LA DIMENSION: 3.5 CM
BH CV ECHO MEAS - LAT PEAK E' VEL: 5.7 CM/SEC
BH CV ECHO MEAS - LV DIASTOLIC VOL/BSA (35-75): 40.1 CM2
BH CV ECHO MEAS - LV MASS(C)D: 213.9 GRAMS
BH CV ECHO MEAS - LV SYSTOLIC VOL/BSA (12-30): 16.8 CM2
BH CV ECHO MEAS - LVIDD: 5.1 CM
BH CV ECHO MEAS - LVIDS: 3.7 CM
BH CV ECHO MEAS - LVOT AREA: 3.1 CM2
BH CV ECHO MEAS - LVOT DIAM: 2 CM
BH CV ECHO MEAS - LVPWD: 1.2 CM
BH CV ECHO MEAS - MED PEAK E' VEL: 7.2 CM/SEC
BH CV ECHO MEAS - MV A MAX VEL: 80.8 CM/SEC
BH CV ECHO MEAS - MV E MAX VEL: 69.8 CM/SEC
BH CV ECHO MEAS - MV E/A: 0.86
BH CV ECHO MEAS - PA ACC TIME: 0.12 SEC
BH CV ECHO MEAS - SI(MOD-SP4): 23.2 ML/M2
BH CV ECHO MEAS - SV(MOD-SP4): 50.1 ML
BH CV ECHO MEAS - TAPSE (>1.6): 2.04 CM
BH CV ECHO MEASUREMENTS AVERAGE E/E' RATIO: 10.82
LEFT ATRIUM VOLUME INDEX: 17.8 ML/M2

## 2024-04-05 ENCOUNTER — TELEPHONE (OUTPATIENT)
Dept: CARDIOLOGY | Facility: CLINIC | Age: 59
End: 2024-04-05
Payer: COMMERCIAL

## 2024-04-05 LAB
BH CV NUCLEAR PRIOR STUDY: 3
BH CV REST NUCLEAR ISOTOPE DOSE: 9.9 MCI
BH CV STRESS BP STAGE 1: NORMAL
BH CV STRESS COMMENTS STAGE 1: NORMAL
BH CV STRESS DOSE REGADENOSON STAGE 1: 0.4
BH CV STRESS DURATION MIN STAGE 1: 0
BH CV STRESS DURATION SEC STAGE 1: 10
BH CV STRESS HR STAGE 1: 100
BH CV STRESS NUCLEAR ISOTOPE DOSE: 30.2 MCI
BH CV STRESS PROTOCOL 1: NORMAL
BH CV STRESS RECOVERY BP: NORMAL MMHG
BH CV STRESS RECOVERY HR: 97 BPM
BH CV STRESS STAGE 1: 1
LV EF NUC BP: 51 %
MAXIMAL PREDICTED HEART RATE: 163 BPM
PERCENT MAX PREDICTED HR: 61.35 %
STRESS BASELINE BP: NORMAL MMHG
STRESS BASELINE HR: 81 BPM
STRESS PERCENT HR: 72 %
STRESS POST PEAK BP: NORMAL MMHG
STRESS POST PEAK HR: 100 BPM
STRESS TARGET HR: 139 BPM

## 2024-04-05 NOTE — TELEPHONE ENCOUNTER
Spoke to patient about normal echo results per Nayeli's request. Patient is understanding of results. When stress test results are routed to our office we will call patient with those as well.

## 2024-04-05 NOTE — TELEPHONE ENCOUNTER
----- Message from ROSA MARIA Metz sent at 4/4/2024 10:01 PM EDT -----    Echocardiogram showed normal pumping function of the heart.  There were no hemodynamically significant valvular abnormalities.    I know you also had your stress test done on the same day but I do not yet have those results.  I believe they may have been put in a box for a physician who is not here this week.  I will get with the stress lab and have them change it so we can get those results for you quicker.  I apologize for the wait.    Thanks, Nayeli

## 2024-04-10 ENCOUNTER — TELEPHONE (OUTPATIENT)
Dept: CARDIOLOGY | Facility: CLINIC | Age: 59
End: 2024-04-10
Payer: COMMERCIAL

## 2024-04-10 NOTE — TELEPHONE ENCOUNTER
----- Message from ROSA MARIA Metz sent at 4/5/2024  4:22 PM EDT -----  Please call patient about their normal result.    Stress test was negative for any concerning blockage in the heart.    Thanks, Nayeli

## 2024-04-11 PROBLEM — Z79.01 CHRONIC ANTICOAGULATION: Status: ACTIVE | Noted: 2024-04-11

## 2024-04-11 PROBLEM — M25.512 PAIN IN JOINT OF LEFT SHOULDER: Status: RESOLVED | Noted: 2017-07-07 | Resolved: 2024-04-11

## 2024-04-15 ENCOUNTER — OFFICE VISIT (OUTPATIENT)
Dept: CARDIOLOGY | Facility: CLINIC | Age: 59
End: 2024-04-15
Payer: COMMERCIAL

## 2024-04-15 VITALS
WEIGHT: 217.2 LBS | HEIGHT: 69 IN | SYSTOLIC BLOOD PRESSURE: 130 MMHG | OXYGEN SATURATION: 94 % | HEART RATE: 89 BPM | BODY MASS INDEX: 32.17 KG/M2 | DIASTOLIC BLOOD PRESSURE: 72 MMHG

## 2024-04-15 DIAGNOSIS — Z79.01 CHRONIC ANTICOAGULATION: ICD-10-CM

## 2024-04-15 DIAGNOSIS — Z86.73 HISTORY OF CVA (CEREBROVASCULAR ACCIDENT): Chronic | ICD-10-CM

## 2024-04-15 DIAGNOSIS — Z95.810 PRESENCE OF IMPLANTABLE CARDIOVERTER-DEFIBRILLATOR (ICD): Chronic | ICD-10-CM

## 2024-04-15 DIAGNOSIS — I50.42 CHRONIC COMBINED SYSTOLIC AND DIASTOLIC CONGESTIVE HEART FAILURE: Chronic | ICD-10-CM

## 2024-04-15 DIAGNOSIS — I42.0 CARDIOMYOPATHY, DILATED: Primary | Chronic | ICD-10-CM

## 2024-04-15 PROCEDURE — 3075F SYST BP GE 130 - 139MM HG: CPT | Performed by: INTERNAL MEDICINE

## 2024-04-15 PROCEDURE — 3078F DIAST BP <80 MM HG: CPT | Performed by: INTERNAL MEDICINE

## 2024-04-15 PROCEDURE — 99214 OFFICE O/P EST MOD 30 MIN: CPT | Performed by: INTERNAL MEDICINE

## 2024-04-15 RX ORDER — BLOOD-GLUCOSE SENSOR
EACH MISCELLANEOUS
COMMUNITY
Start: 2024-02-16

## 2024-04-15 NOTE — PROGRESS NOTES
Cardiac Electrophysiology Outpatient Follow Up Note            [facility]    Follow Up Office Visit      Cas Mccabe  [id number]  04/15/2024  [unfilled]  [unfilled]    Primary Care Physician: Justa Ruiz APRN    Referred By: Nayeli Yun APRN    Subjective     Chief Complaint:   Diagnoses and all orders for this visit:    1. Cardiomyopathy, dilated (Primary)    2. Presence of implantable cardioverter-defibrillator (ICD)    3. Chronic combined systolic and diastolic congestive heart failure    4. Chronic anticoagulation    5. History of CVA (cerebrovascular accident)      Chief Complaint   Patient presents with    Cardiomyopathy       History of Present Illness:   Cas Mccabe is a 59 y.o. male who presents to my electrophysiology clinic for follow up of above complaints.  Doing well.  Remodeling his kitchen.      Past Medical History:   Past Medical History:   Diagnosis Date    CAD (coronary artery disease)     Diabetes mellitus     Hyperlipidemia     Hypertension        Past Surgical History:   Past Surgical History:   Procedure Laterality Date    CARDIAC CATHETERIZATION      no stents    CARDIAC CATHETERIZATION N/A 10/19/2021    Procedure: Left Heart Cath;  Surgeon: Fabian Arnold MD;  Location:  [facility] CATH INV[facility] LOCATION;  Service: Cardiology;  Laterality: N/A;    CARDIAC CATHETERIZATION N/A 10/28/2021    Procedure: Coronary angiography;  Surgeon: Wisam Ledesma MD;  Location:  [facility]ATH [facility];  Service: Cardiology;  Laterality: N/A;    CARDIAC ELECTROPHYSIOLOGY PROCEDURE N/A 1/14/2022    Procedure: SICD (BSC), hold Eliquis 1 day;  Surgeon: Shadi Larsen DO;  Location:  [facility] EP INVASIVE LOCATION;  Service: Cardiology;  Laterality: N/A;    CORONARY STENT PLACEMENT         Family History:   Family History   Problem Relation Age of Onset    Diabetes Mother     Kidney failure Mother     Diabetes Father      Heart disease Father     Kidney failure Father        Social History:   Social History     Socioeconomic History    Marital status:    Tobacco Use    Smoking status: Never    Smokeless tobacco: Never   Vaping Use    Vaping status: Never Used   Substance and Sexual Activity    Alcohol use: No    Drug use: No    Sexual activity: Yes     Partners: Female     Birth control/protection: None       Medications:     Current Outpatient Medications:     apixaban (ELIQUIS) 5 MG tablet tablet, Take 1 tablet by mouth Every 12 (Twelve) Hours. Indications: Other - full anticoagulation, Disp: 180 tablet, Rfl: 3    atorvastatin (LIPITOR) 80 MG tablet, Take 1 tablet by mouth Every Night., Disp: 90 tablet, Rfl: 3    carvedilol (COREG) 6.25 MG tablet, Take 1 tablet by mouth 2 (Two) Times a Day., Disp: , Rfl:     clopidogrel (PLAVIX) 75 MG tablet, TAKE ONE TABLET BY MOUTH DAILY, Disp: 30 tablet, Rfl: 6    Continuous Blood Gluc Sensor (FreeStyle Avril 3 Sensor) misc, , Disp: , Rfl:     empagliflozin (Jardiance) 10 MG tablet tablet, Take 1 tablet by mouth Daily., Disp: 30 tablet, Rfl: 0    glipizide (GLUCOTROL) 10 MG tablet, Take 1 tablet by mouth 2 (Two) Times a Day Before Meals., Disp: , Rfl:     icosapent ethyl (VASCEPA) 1 g capsule capsule, Take 1 g by mouth Daily., Disp: , Rfl:     isosorbide mononitrate (IMDUR) 30 MG 24 hr tablet, Take 1 tablet by mouth Daily., Disp: 30 tablet, Rfl: 11    metFORMIN (GLUCOPHAGE) 1000 MG tablet, Take 1 tablet by mouth 2 (Two) Times a Day With Meals., Disp: , Rfl:     sacubitril-valsartan (Entresto) 49-51 MG tablet, Take 1 tablet by mouth 2 (Two) Times a Day., Disp: 60 tablet, Rfl: 3    spironolactone (ALDACTONE) 25 MG tablet, Take 1 tablet by mouth Daily., Disp: 90 tablet, Rfl: 3    Tirzepatide (Mounjaro) 5 MG/0.5ML solution pen-injector, Inject 0.5 mL under the skin into the appropriate area as directed 1 (One) Time Per Week., Disp: , Rfl:     Allergies:   No Known Allergies    Objective  "  Vital Signs:   Vitals:    04/15/24 0950   BP: 130/72   Pulse: 89   SpO2: 94%   Weight: 98.5 kg (217 lb 3.2 oz)   Height: 175.3 cm (69\")       PHYSICAL EXAM  General appearance: Awake, alert, cooperative  Head: Normocephalic, without obvious abnormality, atraumatic  Eyes: Conjunctivae/corneas clear, EOMs intact  Neck: no adenopathy, no carotid bruit, no JVD, and thyroid: not enlarged  Lungs: clear to auscultation bilaterally and no rhonchi or crackles\", ' symmetric  Heart: regular rate and rhythm, S1, S2 normal, no murmur, click, rub or gallop  Abdomen: Soft, non-tender, bowel sounds normal,  no organomegaly  Extremities: extremities normal, atraumatic, no cyanosis or edema  Skin: Skin color, turgor normal, no rashes or lesions  Neurologic: Grossly normal     Lab Results   Component Value Date    GLUCOSE 241 (H) 11/01/2022    CALCIUM 9.6 11/01/2022     11/01/2022    K 4.3 11/01/2022    CO2 26.0 11/01/2022     11/01/2022    BUN 19 11/01/2022    CREATININE 0.74 (L) 11/01/2022    EGFRIFNONA 107 01/14/2022    BCR 25.7 (H) 11/01/2022    ANIONGAP 8.0 11/01/2022     Lab Results   Component Value Date    WBC 7.80 01/14/2022    HGB 15.5 01/14/2022    HCT 46.7 01/14/2022    MCV 86.3 01/14/2022     01/14/2022     Lab Results   Component Value Date    INR 0.91 01/14/2022    INR 0.90 10/05/2021    INR 0.90 12/19/2018    PROTIME 12.0 01/14/2022    PROTIME 12.6 (L) 10/05/2021    PROTIME 12.4 12/19/2018     Lab Results   Component Value Date    TSH 0.811 12/20/2018    V3RLDWO 7.8 01/31/2014       Cardiac Testing:     I personally viewed and interpreted the patient's EKG/Telemetry/lab data    Procedures    Tobacco Cessation: N/A  Obstructive Sleep Apnea Screening: Completed    Advance Care Planning   ACP discussion was declined by the patient. Patient does not have an advance directive, declines further assistance.       Assessment & Plan    Diagnoses and all orders for this visit:    1. Cardiomyopathy, dilated " (Primary)    2. Presence of implantable cardioverter-defibrillator (ICD)    3. Chronic combined systolic and diastolic congestive heart failure    4. Chronic anticoagulation    5. History of CVA (cerebrovascular accident)         Diagnosis Plan   1. Cardiomyopathy, dilated  ER card association functional class II.  Euvolemic.  Doing well.      2. Presence of implantable cardioverter-defibrillator (ICD)  [place] ikeGPS S ICD system in situ.  Appropriate sensing and impedance values.      This patient's Cardiac Implanted Electronic Device was manually interrogated and reprogrammed during the patient encounter today.  Iterative programming changes were manually made to determine the sensing threshold, pacing threshold, lead impedance as well as underlying cardiac rhythm.  These programming changes were not limited to but included some or all of the following when appropriate: pacing mode, programmed AV delays, blanking periods, and refractory periods.  Data obtained as a result of these manual programing changes informed the patient's CIED permanent programming.        3. Chronic combined systolic and diastolic congestive heart failure  Chronic systolic heart failure is quite stable.      4. Chronic anticoagulation  Anticoagulated.  No evidence of A-fib.      5. History of CVA (cerebrovascular accident)  No A-fib.  No evidence of a cardiac source of his CVA.        Body mass index is 32.07 kg/m².    I spent 45 minutes in consultation with this patient which included more than 65% of this time in direct face-to-face counseling, physical examination and discussion of my assessment and findings and this shared decision making with the patient.  The remainder of the time not spent face-to-face was performing one, some or all of the following actions: preparing to see the patient (e.g. reviewing tests, prior clinicians' notes, etc), ordering medications, tests or procedures, coordination of care, discussion of the plan  with other healthcare providers, documenting clinical information in epic as well as independently interpreting results and communication of these results to the patient family and/or caregiver(s).  Please note that this explicitly excludes time spent on other separate billable services such as performing procedures or test interpretation, when applicable.      Follow Up:       Thank you for allowing me to participate in the care of your patient. Please to not hesitate to contact me with additional questions or concerns.      Shadi Larsen, DO, FACC, FHRS  Cardiac Electrophysiologist  [facility] / [facility]

## 2024-06-20 ENCOUNTER — TELEPHONE (OUTPATIENT)
Dept: CARDIOLOGY | Facility: CLINIC | Age: 59
End: 2024-06-20
Payer: COMMERCIAL

## 2024-06-20 NOTE — TELEPHONE ENCOUNTER
Left message letting patient know that his bedside home monitor isn't connecting. Asked him to make sure the monitor and connections are plugged in and to try to send in a manual reading.    Left my name and number should he have questions or need assistance.

## 2024-11-07 ENCOUNTER — LAB (OUTPATIENT)
Dept: LAB | Facility: HOSPITAL | Age: 59
End: 2024-11-07
Payer: COMMERCIAL

## 2024-11-07 ENCOUNTER — TRANSCRIBE ORDERS (OUTPATIENT)
Dept: ADMINISTRATIVE | Facility: HOSPITAL | Age: 59
End: 2024-11-07
Payer: COMMERCIAL

## 2024-11-07 DIAGNOSIS — E55.9 VITAMIN D DEFICIENCY: ICD-10-CM

## 2024-11-07 DIAGNOSIS — R73.9 BLOOD GLUCOSE ELEVATED: ICD-10-CM

## 2024-11-07 DIAGNOSIS — E78.5 HYPERLIPIDEMIA, UNSPECIFIED HYPERLIPIDEMIA TYPE: ICD-10-CM

## 2024-11-07 DIAGNOSIS — R53.83 TIREDNESS: ICD-10-CM

## 2024-11-07 DIAGNOSIS — I10 HYPERTENSION, ESSENTIAL: ICD-10-CM

## 2024-11-07 DIAGNOSIS — E53.9 VITAMIN B DEFICIENCY: ICD-10-CM

## 2024-11-07 DIAGNOSIS — I10 HYPERTENSION, ESSENTIAL: Primary | ICD-10-CM

## 2024-11-07 LAB
BASOPHILS # BLD AUTO: 0.1 10*3/MM3 (ref 0–0.2)
BASOPHILS NFR BLD AUTO: 1.3 % (ref 0–1.5)
DEPRECATED RDW RBC AUTO: 40.4 FL (ref 37–54)
EOSINOPHIL # BLD AUTO: 0.29 10*3/MM3 (ref 0–0.4)
EOSINOPHIL NFR BLD AUTO: 3.8 % (ref 0.3–6.2)
ERYTHROCYTE [DISTWIDTH] IN BLOOD BY AUTOMATED COUNT: 12.5 % (ref 12.3–15.4)
HBA1C MFR BLD: 11.3 % (ref 4.8–5.6)
HCT VFR BLD AUTO: 44 % (ref 37.5–51)
HGB BLD-MCNC: 14.6 G/DL (ref 13–17.7)
IMM GRANULOCYTES # BLD AUTO: 0.02 10*3/MM3 (ref 0–0.05)
IMM GRANULOCYTES NFR BLD AUTO: 0.3 % (ref 0–0.5)
LYMPHOCYTES # BLD AUTO: 2.96 10*3/MM3 (ref 0.7–3.1)
LYMPHOCYTES NFR BLD AUTO: 38.5 % (ref 19.6–45.3)
MCH RBC QN AUTO: 29.4 PG (ref 26.6–33)
MCHC RBC AUTO-ENTMCNC: 33.2 G/DL (ref 31.5–35.7)
MCV RBC AUTO: 88.7 FL (ref 79–97)
MONOCYTES # BLD AUTO: 0.44 10*3/MM3 (ref 0.1–0.9)
MONOCYTES NFR BLD AUTO: 5.7 % (ref 5–12)
NEUTROPHILS NFR BLD AUTO: 3.87 10*3/MM3 (ref 1.7–7)
NEUTROPHILS NFR BLD AUTO: 50.4 % (ref 42.7–76)
NRBC BLD AUTO-RTO: 0 /100 WBC (ref 0–0.2)
PLATELET # BLD AUTO: 282 10*3/MM3 (ref 140–450)
PMV BLD AUTO: 8.8 FL (ref 6–12)
RBC # BLD AUTO: 4.96 10*6/MM3 (ref 4.14–5.8)
WBC NRBC COR # BLD AUTO: 7.68 10*3/MM3 (ref 3.4–10.8)

## 2024-11-07 PROCEDURE — 81001 URINALYSIS AUTO W/SCOPE: CPT

## 2024-11-07 PROCEDURE — 84443 ASSAY THYROID STIM HORMONE: CPT

## 2024-11-07 PROCEDURE — 82607 VITAMIN B-12: CPT

## 2024-11-07 PROCEDURE — 85025 COMPLETE CBC W/AUTO DIFF WBC: CPT

## 2024-11-07 PROCEDURE — 82306 VITAMIN D 25 HYDROXY: CPT

## 2024-11-07 PROCEDURE — 80053 COMPREHEN METABOLIC PANEL: CPT

## 2024-11-07 PROCEDURE — 83036 HEMOGLOBIN GLYCOSYLATED A1C: CPT

## 2024-11-07 PROCEDURE — 80061 LIPID PANEL: CPT

## 2024-11-07 PROCEDURE — 84439 ASSAY OF FREE THYROXINE: CPT

## 2024-11-07 PROCEDURE — 36415 COLL VENOUS BLD VENIPUNCTURE: CPT

## 2024-11-08 LAB
25(OH)D3 SERPL-MCNC: 38.3 NG/ML (ref 30–100)
ALBUMIN SERPL-MCNC: 4.2 G/DL (ref 3.5–5.2)
ALBUMIN/GLOB SERPL: 1.4 G/DL
ALP SERPL-CCNC: 82 U/L (ref 39–117)
ALT SERPL W P-5'-P-CCNC: 20 U/L (ref 1–41)
ANION GAP SERPL CALCULATED.3IONS-SCNC: 14.6 MMOL/L (ref 5–15)
AST SERPL-CCNC: 15 U/L (ref 1–40)
BACTERIA UR QL AUTO: NORMAL /HPF
BILIRUB SERPL-MCNC: 0.6 MG/DL (ref 0–1.2)
BILIRUB UR QL STRIP: NEGATIVE
BUN SERPL-MCNC: 15 MG/DL (ref 6–20)
BUN/CREAT SERPL: 18.5 (ref 7–25)
CALCIUM SPEC-SCNC: 9.7 MG/DL (ref 8.6–10.5)
CHLORIDE SERPL-SCNC: 96 MMOL/L (ref 98–107)
CHOLEST SERPL-MCNC: 197 MG/DL (ref 0–200)
CLARITY UR: CLEAR
CO2 SERPL-SCNC: 23.4 MMOL/L (ref 22–29)
COLOR UR: YELLOW
CREAT SERPL-MCNC: 0.81 MG/DL (ref 0.76–1.27)
EGFRCR SERPLBLD CKD-EPI 2021: 102.8 ML/MIN/1.73
GLOBULIN UR ELPH-MCNC: 3 GM/DL
GLUCOSE SERPL-MCNC: 364 MG/DL (ref 65–99)
GLUCOSE UR STRIP-MCNC: ABNORMAL MG/DL
HDLC SERPL-MCNC: 39 MG/DL (ref 40–60)
HGB UR QL STRIP.AUTO: NEGATIVE
HYALINE CASTS UR QL AUTO: NORMAL /LPF
KETONES UR QL STRIP: NEGATIVE
LDLC SERPL CALC-MCNC: 118 MG/DL (ref 0–100)
LDLC/HDLC SERPL: 2.89 {RATIO}
LEUKOCYTE ESTERASE UR QL STRIP.AUTO: NEGATIVE
NITRITE UR QL STRIP: NEGATIVE
PH UR STRIP.AUTO: 6 [PH] (ref 5–8)
POTASSIUM SERPL-SCNC: 4.6 MMOL/L (ref 3.5–5.2)
PROT SERPL-MCNC: 7.2 G/DL (ref 6–8.5)
PROT UR QL STRIP: NEGATIVE
RBC # UR STRIP: NORMAL /HPF
REF LAB TEST METHOD: NORMAL
SODIUM SERPL-SCNC: 134 MMOL/L (ref 136–145)
SP GR UR STRIP: >1.03 (ref 1–1.03)
SQUAMOUS #/AREA URNS HPF: NORMAL /HPF
T4 FREE SERPL-MCNC: 1.14 NG/DL (ref 0.92–1.68)
TRIGL SERPL-MCNC: 227 MG/DL (ref 0–150)
TSH SERPL DL<=0.05 MIU/L-ACNC: 0.84 UIU/ML (ref 0.27–4.2)
UROBILINOGEN UR QL STRIP: ABNORMAL
VIT B12 BLD-MCNC: 603 PG/ML (ref 211–946)
VLDLC SERPL-MCNC: 40 MG/DL (ref 5–40)
WBC # UR STRIP: NORMAL /HPF

## 2025-03-13 NOTE — TELEPHONE ENCOUNTER
Nayeli , I attempted to reach patient today with all three numbers listed in chart. Left vm on them. Would you prefer to send this patient a letter ?

## 2025-03-18 ENCOUNTER — TELEPHONE (OUTPATIENT)
Dept: CARDIOLOGY | Facility: CLINIC | Age: 60
End: 2025-03-18
Payer: COMMERCIAL

## 2025-03-18 NOTE — TELEPHONE ENCOUNTER
Lvm for patient that Dr. Larsen will be ooo on 4/21/25. Patient has been rescheduled and appt details were left on voicemail.

## 2025-03-24 ENCOUNTER — TELEPHONE (OUTPATIENT)
Dept: CARDIOLOGY | Facility: CLINIC | Age: 60
End: 2025-03-24

## 2025-03-24 NOTE — TELEPHONE ENCOUNTER
"Caller: Cas Mccabe \"Ousmane\"    Relationship to patient: Self    Best call back number: [phone]    Chief complaint: NONE    Type of visit: F/U    Requested date: ASAP    Additional notes: REQUEST AM APPT. NO SCHEDULING TIMEFRAME IN CHART FOR HUB TO SCHEDULE APPT.          "

## 2025-03-27 PROCEDURE — 93296 REM INTERROG EVL PM/IDS: CPT | Performed by: INTERNAL MEDICINE

## 2025-03-27 PROCEDURE — 93295 DEV INTERROG REMOTE 1/2/MLT: CPT | Performed by: INTERNAL MEDICINE

## 2025-03-28 RX ORDER — ISOSORBIDE MONONITRATE 30 MG/1
30 TABLET, EXTENDED RELEASE ORAL DAILY
Qty: 30 TABLET | Refills: 0 | Status: SHIPPED | OUTPATIENT
Start: 2025-03-28

## 2025-05-12 ENCOUNTER — TELEPHONE (OUTPATIENT)
Dept: CARDIOLOGY | Facility: CLINIC | Age: 60
End: 2025-05-12

## 2025-05-12 NOTE — TELEPHONE ENCOUNTER
"Name: Cas Mccabe \"Ousmane\"    Relationship: Self    Best Callback Number: [phone]    Incoming call to the Hub, requesting to  Reschedule their Device Check appointment on 05/12/25.     Per Hub workflow, further review is needed before the task can be completed.    Result of Call: Transferred to [facility] at the practice    "

## 2025-06-19 LAB
MC_CV_MDC_IDC_RATE_1: 200
MC_CV_MDC_IDC_RATE_1: 250
MC_CV_MDC_IDC_SHOCK_MEASURED_IMPEDANCE: 105
MC_CV_MDC_IDC_THERAPIES: NORMAL
MC_CV_MDC_IDC_THERAPIES: NORMAL
MC_CV_MDC_IDC_ZONE_ID: 1
MC_CV_MDC_IDC_ZONE_ID: 2
MDC_IDC_MSMT_BATTERY_REMAINING_PERCENTAGE: 60 %
MDC_IDC_MSMT_BATTERY_STATUS: NORMAL
MDC_IDC_PG_IMPLANT_DTM: NORMAL
MDC_IDC_PG_MFG: NORMAL
MDC_IDC_PG_MODEL: NORMAL
MDC_IDC_PG_SERIAL: NORMAL
MDC_IDC_PG_TYPE: NORMAL
MDC_IDC_SESS_DTM: NORMAL
MDC_IDC_SESS_TYPE: NORMAL
MDC_IDC_SET_ZONE_STATUS: NORMAL
MDC_IDC_SET_ZONE_STATUS: NORMAL
MDC_IDC_SET_ZONE_TYPE: NORMAL
MDC_IDC_SET_ZONE_TYPE: NORMAL
MDC_IDC_STAT_TACHYTHERAPY_SHOCKS_DELIVERED_RECENT: 0

## 2025-07-18 LAB
MC_CV_MDC_IDC_RATE_1: 200
MC_CV_MDC_IDC_RATE_1: 250
MC_CV_MDC_IDC_SHOCK_MEASURED_IMPEDANCE: 105
MC_CV_MDC_IDC_THERAPIES: NORMAL
MC_CV_MDC_IDC_THERAPIES: NORMAL
MC_CV_MDC_IDC_ZONE_ID: 1
MC_CV_MDC_IDC_ZONE_ID: 2
MDC_IDC_MSMT_BATTERY_REMAINING_PERCENTAGE: 60 %
MDC_IDC_MSMT_BATTERY_STATUS: NORMAL
MDC_IDC_PG_IMPLANT_DTM: NORMAL
MDC_IDC_PG_MFG: NORMAL
MDC_IDC_PG_MODEL: NORMAL
MDC_IDC_PG_SERIAL: NORMAL
MDC_IDC_PG_TYPE: NORMAL
MDC_IDC_SESS_DTM: NORMAL
MDC_IDC_SESS_TYPE: NORMAL
MDC_IDC_SET_ZONE_STATUS: NORMAL
MDC_IDC_SET_ZONE_STATUS: NORMAL
MDC_IDC_SET_ZONE_TYPE: NORMAL
MDC_IDC_SET_ZONE_TYPE: NORMAL
MDC_IDC_STAT_TACHYTHERAPY_SHOCKS_DELIVERED_RECENT: 0

## (undated) DEVICE — RADIFOCUS OPTITORQUE ANGIOGRAPHIC CATHETER: Brand: OPTITORQUE

## (undated) DEVICE — PK CATH CARD 70

## (undated) DEVICE — LEX ELECTRO PHYSIOLOGY: Brand: MEDLINE INDUSTRIES, INC.

## (undated) DEVICE — GW INQW FIX/CORE PTFE J/3MM .035 260CM

## (undated) DEVICE — CATH F5 INF PIG145 110CM 6SH: Brand: INFINITI

## (undated) DEVICE — IRRIGATOR BULB ASEPTO 60CC STRL

## (undated) DEVICE — DECANT BG O JET

## (undated) DEVICE — MODEL BT2000 P/N 700287-012KIT CONTENTS: MANIFOLD WITH SALINE AND CONTRAST PORTS, SALINE TUBING WITH SPIKE AND HAND SYRINGE, TRANSDUCER: Brand: BT2000 AUTOMATED MANIFOLD KIT

## (undated) DEVICE — MEDI-VAC YANKAUER SUCTION HANDLE W/BULBOUS TIP: Brand: CARDINAL HEALTH

## (undated) DEVICE — DRAPE, RADIAL, STERILE: Brand: MEDLINE

## (undated) DEVICE — DRSNG TELFA PAD NONADH STR 1S 3X8IN

## (undated) DEVICE — ADULT, W/LG. BACK PAD, RADIOTRANSPARENT ELEMENT AND LEAD WIRE: Brand: DEFIBRILLATION ELECTRODES

## (undated) DEVICE — DEV INFL MONARCH 25W

## (undated) DEVICE — PLASMABLADE PS210-030S 3.0S LOCK: Brand: PLASMABLADE™

## (undated) DEVICE — GUIDE CATHETER: Brand: MACH1™

## (undated) DEVICE — PK CATH CARD 10

## (undated) DEVICE — ST INF PRI SMRTSTE 20DRP 2VLV 24ML 117

## (undated) DEVICE — CAUTERY TIP POLISHER: Brand: DEVON

## (undated) DEVICE — ANGIOGRAPHIC CATHETER: Brand: EXPO™

## (undated) DEVICE — DRSNG SURESITE123 4X4.8IN

## (undated) DEVICE — DRSNG SURESITE WNDW 4X4.5

## (undated) DEVICE — ST EXT IV SMARTSITE 2VLV SP M LL 5ML IV1

## (undated) DEVICE — PAD, DEFIB, ADULT, RADIOTRANS, ZOLL: Brand: MEDLINE

## (undated) DEVICE — RUNWAY RADL W/TOP PAD

## (undated) DEVICE — SET PRIMARY GRVTY 10DP MALE LL 104IN

## (undated) DEVICE — Device: Brand: ASAHI SION BLUE

## (undated) DEVICE — TUBING, SUCTION, 1/4" X 10', STRAIGHT: Brand: MEDLINE

## (undated) DEVICE — ADULT DISPOSABLE SINGLE-PATIENT USE PULSE OXIMETER SENSOR: Brand: NONIN

## (undated) DEVICE — TREK CORONARY DILATATION CATHETER 3.0 MM X 8 MM / RAPID-EXCHANGE: Brand: TREK

## (undated) DEVICE — LIMB HOLDER, WRIST/ANKLE: Brand: DEROYAL

## (undated) DEVICE — CATH INTRAVAS ULTRASND EAGLE EYE 2.9FR

## (undated) DEVICE — GW PERIPH GUIDERIGHT STD/EXCHNG/J/TIP SS 0.035IN 5X260CM

## (undated) DEVICE — ELECTRD RETRN/GRND MEGADYNE SGL/PLT W/CORD 9FT DISP

## (undated) DEVICE — LN SMPL O2 NASL/ORL SMART/CAPNOLINE PLS A/

## (undated) DEVICE — GLIDESHEATH SLENDER STAINLESS STEEL KIT: Brand: GLIDESHEATH SLENDER

## (undated) DEVICE — DEV COMP RAD PRELUDESYNC 24CM

## (undated) DEVICE — SOL NACL 0.9PCT 1000ML

## (undated) DEVICE — CANN NASL CO2 DIVIDED A/

## (undated) DEVICE — TR BAND RADIAL ARTERY COMPRESSION DEVICE: Brand: TR BAND

## (undated) DEVICE — MODEL AT P65, P/N 701554-001KIT CONTENTS: HAND CONTROLLER, 3-WAY HIGH-PRESSURE STOPCOCK WITH ROTATING END AND PREMIUM HIGH-PRESSURE TUBING: Brand: ANGIOTOUCH® KIT